# Patient Record
Sex: MALE | Race: WHITE | Employment: OTHER | URBAN - METROPOLITAN AREA
[De-identification: names, ages, dates, MRNs, and addresses within clinical notes are randomized per-mention and may not be internally consistent; named-entity substitution may affect disease eponyms.]

---

## 2017-02-06 RX ORDER — METOPROLOL SUCCINATE 50 MG/1
TABLET, EXTENDED RELEASE ORAL
Qty: 90 TABLET | Refills: 0 | Status: SHIPPED | OUTPATIENT
Start: 2017-02-06 | End: 2017-05-01 | Stop reason: SDUPTHER

## 2017-03-20 ENCOUNTER — HOSPITAL ENCOUNTER (OUTPATIENT)
Age: 72
Discharge: HOME | End: 2017-03-20
Payer: MEDICARE

## 2017-05-01 ENCOUNTER — OFFICE VISIT (OUTPATIENT)
Dept: FAMILY MEDICINE CLINIC | Age: 72
End: 2017-05-01
Payer: MEDICARE

## 2017-05-01 VITALS
HEIGHT: 73 IN | BODY MASS INDEX: 41.75 KG/M2 | OXYGEN SATURATION: 98 % | HEART RATE: 94 BPM | SYSTOLIC BLOOD PRESSURE: 162 MMHG | DIASTOLIC BLOOD PRESSURE: 86 MMHG | WEIGHT: 315 LBS

## 2017-05-01 DIAGNOSIS — E66.01 MORBID OBESITY WITH BMI OF 40.0-44.9, ADULT (HCC): Primary | ICD-10-CM

## 2017-05-01 DIAGNOSIS — Z11.59 NEED FOR HEPATITIS C SCREENING TEST: ICD-10-CM

## 2017-05-01 DIAGNOSIS — F17.200 SMOKER UNMOTIVATED TO QUIT: ICD-10-CM

## 2017-05-01 DIAGNOSIS — I10 ESSENTIAL HYPERTENSION: ICD-10-CM

## 2017-05-01 PROCEDURE — 4040F PNEUMOC VAC/ADMIN/RCVD: CPT | Performed by: FAMILY MEDICINE

## 2017-05-01 PROCEDURE — 99214 OFFICE O/P EST MOD 30 MIN: CPT | Performed by: FAMILY MEDICINE

## 2017-05-01 PROCEDURE — G8417 CALC BMI ABV UP PARAM F/U: HCPCS | Performed by: FAMILY MEDICINE

## 2017-05-01 PROCEDURE — 3017F COLORECTAL CA SCREEN DOC REV: CPT | Performed by: FAMILY MEDICINE

## 2017-05-01 PROCEDURE — 1123F ACP DISCUSS/DSCN MKR DOCD: CPT | Performed by: FAMILY MEDICINE

## 2017-05-01 PROCEDURE — 4004F PT TOBACCO SCREEN RCVD TLK: CPT | Performed by: FAMILY MEDICINE

## 2017-05-01 PROCEDURE — G8427 DOCREV CUR MEDS BY ELIG CLIN: HCPCS | Performed by: FAMILY MEDICINE

## 2017-05-01 RX ORDER — METOPROLOL SUCCINATE 100 MG/1
TABLET, EXTENDED RELEASE ORAL
Qty: 90 TABLET | Refills: 3 | Status: SHIPPED | OUTPATIENT
Start: 2017-05-01 | End: 2018-04-08 | Stop reason: SDUPTHER

## 2017-05-03 ENCOUNTER — HOSPITAL ENCOUNTER (OUTPATIENT)
Age: 72
Setting detail: SPECIMEN
Discharge: HOME | End: 2017-05-03
Payer: MEDICARE

## 2017-05-03 DIAGNOSIS — I10 ESSENTIAL HYPERTENSION: ICD-10-CM

## 2017-05-03 LAB
ABSOLUTE EOS #: 0.3 K/UL (ref 0–0.4)
ABSOLUTE LYMPH #: 2 K/UL (ref 1–4.8)
ABSOLUTE MONO #: 0.7 K/UL (ref 0.1–1.2)
ALBUMIN SERPL-MCNC: 3.9 G/DL (ref 3.5–5.2)
ALBUMIN/GLOBULIN RATIO: 1.1 (ref 1–2.5)
ALP BLD-CCNC: 61 U/L (ref 40–129)
ALT SERPL-CCNC: 11 U/L (ref 5–41)
ANION GAP SERPL CALCULATED.3IONS-SCNC: 15 MMOL/L (ref 9–17)
AST SERPL-CCNC: 15 U/L
BASOPHILS # BLD: 1 %
BASOPHILS ABSOLUTE: 0.1 K/UL (ref 0–0.2)
BILIRUB SERPL-MCNC: 0.42 MG/DL (ref 0.3–1.2)
BUN BLDV-MCNC: 17 MG/DL (ref 8–23)
BUN/CREAT BLD: ABNORMAL (ref 9–20)
CALCIUM SERPL-MCNC: 9.5 MG/DL (ref 8.6–10.4)
CHLORIDE BLD-SCNC: 103 MMOL/L (ref 98–107)
CHOLESTEROL/HDL RATIO: 5
CHOLESTEROL: 201 MG/DL
CO2: 26 MMOL/L (ref 20–31)
CREAT SERPL-MCNC: 0.83 MG/DL (ref 0.7–1.2)
DIFFERENTIAL TYPE: NORMAL
EOSINOPHILS RELATIVE PERCENT: 3 %
GFR AFRICAN AMERICAN: >60 ML/MIN
GFR NON-AFRICAN AMERICAN: >60 ML/MIN
GFR SERPL CREATININE-BSD FRML MDRD: ABNORMAL ML/MIN/{1.73_M2}
GFR SERPL CREATININE-BSD FRML MDRD: ABNORMAL ML/MIN/{1.73_M2}
GLUCOSE BLD-MCNC: 115 MG/DL (ref 70–99)
HCT VFR BLD CALC: 50.3 % (ref 41–53)
HDLC SERPL-MCNC: 40 MG/DL
HEMOGLOBIN: 16.7 G/DL (ref 13.5–17.5)
LDL CHOLESTEROL: 137 MG/DL (ref 0–130)
LYMPHOCYTES # BLD: 23 %
MCH RBC QN AUTO: 29.9 PG (ref 26–34)
MCHC RBC AUTO-ENTMCNC: 33.2 G/DL (ref 31–37)
MCV RBC AUTO: 90 FL (ref 80–100)
MONOCYTES # BLD: 8 %
PDW BLD-RTO: 13.6 % (ref 12.5–15.4)
PLATELET # BLD: 182 K/UL (ref 140–450)
PLATELET ESTIMATE: NORMAL
PMV BLD AUTO: 11.6 FL (ref 6–12)
POTASSIUM SERPL-SCNC: 4.9 MMOL/L (ref 3.7–5.3)
RBC # BLD: 5.59 M/UL (ref 4.5–5.9)
RBC # BLD: NORMAL 10*6/UL
SEG NEUTROPHILS: 65 %
SEGMENTED NEUTROPHILS ABSOLUTE COUNT: 5.7 K/UL (ref 1.8–7.7)
SODIUM BLD-SCNC: 144 MMOL/L (ref 135–144)
TOTAL PROTEIN: 7.6 G/DL (ref 6.4–8.3)
TRIGL SERPL-MCNC: 121 MG/DL
TSH SERPL DL<=0.05 MIU/L-ACNC: 0.98 MIU/L (ref 0.3–5)
VLDLC SERPL CALC-MCNC: ABNORMAL MG/DL (ref 1–30)
WBC # BLD: 8.7 K/UL (ref 3.5–11)
WBC # BLD: NORMAL 10*3/UL

## 2017-05-08 ENCOUNTER — HOSPITAL ENCOUNTER (OUTPATIENT)
Age: 72
Setting detail: SPECIMEN
Discharge: HOME | End: 2017-05-08
Payer: MEDICARE

## 2017-05-08 DIAGNOSIS — Z11.59 NEED FOR HEPATITIS C SCREENING TEST: ICD-10-CM

## 2017-05-08 LAB — HEPATITIS C ANTIBODY: NONREACTIVE

## 2017-05-09 DIAGNOSIS — E78.00 PURE HYPERCHOLESTEROLEMIA: Primary | Chronic | ICD-10-CM

## 2017-05-09 RX ORDER — ATORVASTATIN CALCIUM 20 MG/1
20 TABLET, FILM COATED ORAL DAILY
Qty: 30 TABLET | Refills: 11 | Status: SHIPPED | OUTPATIENT
Start: 2017-05-09 | End: 2018-05-03 | Stop reason: SDUPTHER

## 2017-05-10 DIAGNOSIS — E78.00 PURE HYPERCHOLESTEROLEMIA: Chronic | ICD-10-CM

## 2017-06-26 ENCOUNTER — OFFICE VISIT (OUTPATIENT)
Dept: FAMILY MEDICINE CLINIC | Age: 72
End: 2017-06-26
Payer: MEDICARE

## 2017-06-26 VITALS
HEIGHT: 78 IN | BODY MASS INDEX: 36.45 KG/M2 | OXYGEN SATURATION: 97 % | HEART RATE: 73 BPM | SYSTOLIC BLOOD PRESSURE: 160 MMHG | WEIGHT: 315 LBS | DIASTOLIC BLOOD PRESSURE: 72 MMHG

## 2017-06-26 DIAGNOSIS — E78.00 PURE HYPERCHOLESTEROLEMIA: Primary | ICD-10-CM

## 2017-06-26 DIAGNOSIS — I10 ESSENTIAL HYPERTENSION: ICD-10-CM

## 2017-06-26 DIAGNOSIS — F17.200 SMOKER UNMOTIVATED TO QUIT: ICD-10-CM

## 2017-06-26 PROCEDURE — 3017F COLORECTAL CA SCREEN DOC REV: CPT | Performed by: FAMILY MEDICINE

## 2017-06-26 PROCEDURE — G8427 DOCREV CUR MEDS BY ELIG CLIN: HCPCS | Performed by: FAMILY MEDICINE

## 2017-06-26 PROCEDURE — G8417 CALC BMI ABV UP PARAM F/U: HCPCS | Performed by: FAMILY MEDICINE

## 2017-06-26 PROCEDURE — 1123F ACP DISCUSS/DSCN MKR DOCD: CPT | Performed by: FAMILY MEDICINE

## 2017-06-26 PROCEDURE — 4004F PT TOBACCO SCREEN RCVD TLK: CPT | Performed by: FAMILY MEDICINE

## 2017-06-26 PROCEDURE — 99213 OFFICE O/P EST LOW 20 MIN: CPT | Performed by: FAMILY MEDICINE

## 2017-06-26 PROCEDURE — 4040F PNEUMOC VAC/ADMIN/RCVD: CPT | Performed by: FAMILY MEDICINE

## 2017-06-26 RX ORDER — AMLODIPINE BESYLATE 10 MG/1
TABLET ORAL
Qty: 90 TABLET | Refills: 2 | Status: SHIPPED | OUTPATIENT
Start: 2017-06-26 | End: 2018-03-23 | Stop reason: SDUPTHER

## 2017-07-05 ENCOUNTER — HOSPITAL ENCOUNTER (OUTPATIENT)
Age: 72
Setting detail: SPECIMEN
Discharge: HOME | End: 2017-07-05
Payer: MEDICARE

## 2017-07-05 LAB
ALBUMIN SERPL-MCNC: 3.8 G/DL (ref 3.5–5.2)
ALBUMIN/GLOBULIN RATIO: 1.1 (ref 1–2.5)
ALP BLD-CCNC: 60 U/L (ref 40–129)
ALT SERPL-CCNC: 12 U/L (ref 5–41)
AST SERPL-CCNC: 13 U/L
BILIRUB SERPL-MCNC: 0.4 MG/DL (ref 0.3–1.2)
BILIRUBIN DIRECT: 0.1 MG/DL
BILIRUBIN, INDIRECT: 0.3 MG/DL (ref 0–1)
CHOLESTEROL/HDL RATIO: 3.1
CHOLESTEROL: 132 MG/DL
GLOBULIN: NORMAL G/DL (ref 1.5–3.8)
HDLC SERPL-MCNC: 42 MG/DL
LDL CHOLESTEROL: 67 MG/DL (ref 0–130)
TOTAL PROTEIN: 7.2 G/DL (ref 6.4–8.3)
TRIGL SERPL-MCNC: 113 MG/DL
VLDLC SERPL CALC-MCNC: NORMAL MG/DL (ref 1–30)

## 2017-09-12 RX ORDER — HYDROCHLOROTHIAZIDE 25 MG/1
TABLET ORAL
Qty: 90 TABLET | Refills: 2 | Status: SHIPPED | OUTPATIENT
Start: 2017-09-12 | End: 2018-06-09 | Stop reason: SDUPTHER

## 2017-11-13 RX ORDER — ENALAPRIL MALEATE 20 MG/1
TABLET ORAL
Qty: 90 TABLET | Refills: 3 | Status: SHIPPED | OUTPATIENT
Start: 2017-11-13 | End: 2018-11-08 | Stop reason: SDUPTHER

## 2017-12-18 ENCOUNTER — OFFICE VISIT (OUTPATIENT)
Dept: FAMILY MEDICINE CLINIC | Age: 72
End: 2017-12-18
Payer: MEDICARE

## 2017-12-18 VITALS
WEIGHT: 315 LBS | BODY MASS INDEX: 33.25 KG/M2 | DIASTOLIC BLOOD PRESSURE: 78 MMHG | OXYGEN SATURATION: 98 % | SYSTOLIC BLOOD PRESSURE: 153 MMHG | HEART RATE: 88 BPM

## 2017-12-18 DIAGNOSIS — F17.200 SMOKER UNMOTIVATED TO QUIT: ICD-10-CM

## 2017-12-18 DIAGNOSIS — E78.00 PURE HYPERCHOLESTEROLEMIA: ICD-10-CM

## 2017-12-18 DIAGNOSIS — I10 WHITE COAT SYNDROME WITH HYPERTENSION: Chronic | ICD-10-CM

## 2017-12-18 DIAGNOSIS — I10 ESSENTIAL HYPERTENSION: Primary | ICD-10-CM

## 2017-12-18 PROCEDURE — 4004F PT TOBACCO SCREEN RCVD TLK: CPT | Performed by: FAMILY MEDICINE

## 2017-12-18 PROCEDURE — 4040F PNEUMOC VAC/ADMIN/RCVD: CPT | Performed by: FAMILY MEDICINE

## 2017-12-18 PROCEDURE — G8484 FLU IMMUNIZE NO ADMIN: HCPCS | Performed by: FAMILY MEDICINE

## 2017-12-18 PROCEDURE — 99213 OFFICE O/P EST LOW 20 MIN: CPT | Performed by: FAMILY MEDICINE

## 2017-12-18 PROCEDURE — G8427 DOCREV CUR MEDS BY ELIG CLIN: HCPCS | Performed by: FAMILY MEDICINE

## 2017-12-18 PROCEDURE — G8417 CALC BMI ABV UP PARAM F/U: HCPCS | Performed by: FAMILY MEDICINE

## 2017-12-18 PROCEDURE — 1123F ACP DISCUSS/DSCN MKR DOCD: CPT | Performed by: FAMILY MEDICINE

## 2017-12-18 PROCEDURE — 3017F COLORECTAL CA SCREEN DOC REV: CPT | Performed by: FAMILY MEDICINE

## 2017-12-18 ASSESSMENT — PATIENT HEALTH QUESTIONNAIRE - PHQ9
SUM OF ALL RESPONSES TO PHQ9 QUESTIONS 1 & 2: 0
SUM OF ALL RESPONSES TO PHQ QUESTIONS 1-9: 0
1. LITTLE INTEREST OR PLEASURE IN DOING THINGS: 0
2. FEELING DOWN, DEPRESSED OR HOPELESS: 0

## 2017-12-18 NOTE — PROGRESS NOTES
Subjective:  Karlie Sparks presents for   Chief Complaint   Patient presents with    Hypertension   feesl fine. Tolerating all pf the meds. Continues to smoke and has no interest in quitting. Has been checking bp dialy and avg is about 115/73. We checked his machine and it is accurate compared to ours      Patient Active Problem List   Diagnosis    HTN (hypertension)    Smoker    IGT (impaired glucose tolerance)    Hypertriglyceridemia    Smoker unmotivated to quit    Pure hypercholesterolemia       Review of Systems:  · General: no significant weight changes. · Respiratory: no cough, pleuritic chest pain, dyspnea, or wheezing  · Cardiovascular: no pain, WADE, orthopnea, palpitations, or claudication  · Gastrointestinal: no chronic nausea, vomiting, heartburn, diarrhea, constipation, bloating, or abdominal pain. No bloody or black stools. Objective:  Physical Exam   Vitals:   Vitals:    12/18/17 0824 12/18/17 0828   BP: (!) 156/90 (!) 153/78   Pulse: 88    SpO2: 98%    Weight: (!) 318 lb (144.2 kg)          Vitals:    12/18/17 0824   Weight: (!) 318 lb (144.2 kg)     Wt Readings from Last 3 Encounters:   12/18/17 (!) 318 lb (144.2 kg)   06/26/17 (!) 318 lb 12.8 oz (144.6 kg)   05/01/17 (!) 321 lb 6.4 oz (145.8 kg)     Ht Readings from Last 3 Encounters:   06/26/17 6' 10\" (2.083 m)   05/01/17 6' 1\" (1.854 m)   04/27/16 6' 1.25\" (1.861 m)     Body mass index is 33.25 kg/m². Constitutional: He is oriented to person, place, and time. He appears well-developed and well-nourished and in no acute distress. Answers all my questions appropriately. Head: Normocephalic and atraumatic. Eyes:conjunctiva appear normal.    Nose: pink, non-edematous mucosa. No polyps. No septal deviation  Throat: no erythema, tonsillar hypertrophy or exudate. No ulcerations noted. Lips//Gums all appear normal.  Neck: Normal range of motion. Neck supple. No tracheal deviation present. No abnormal lymphadenopathy.   No JVD

## 2017-12-18 NOTE — PATIENT INSTRUCTIONS
Patient Education        Stopping Smoking: Care Instructions  Your Care Instructions    Cigarette smokers crave the nicotine in cigarettes. Giving it up is much harder than simply changing a habit. Your body has to stop craving the nicotine. It is hard to quit, but you can do it. There are many tools that people use to quit smoking. You may find that combining tools works best for you. There are several steps to quitting. First you get ready to quit. Then you get support to help you. After that, you learn new skills and behaviors to become a nonsmoker. For many people, a necessary step is getting and using medicine. Your doctor will help you set up the plan that best meets your needs. You may want to attend a smoking cessation program to help you quit smoking. When you choose a program, look for one that has proven success. Ask your doctor for ideas. You will greatly increase your chances of success if you take medicine as well as get counseling or join a cessation program.  Some of the changes you feel when you first quit tobacco are uncomfortable. Your body will miss the nicotine at first, and you may feel short-tempered and grumpy. You may have trouble sleeping or concentrating. Medicine can help you deal with these symptoms. You may struggle with changing your smoking habits and rituals. The last step is the tricky one: Be prepared for the smoking urge to continue for a time. This is a lot to deal with, but keep at it. You will feel better. Follow-up care is a key part of your treatment and safety. Be sure to make and go to all appointments, and call your doctor if you are having problems. It's also a good idea to know your test results and keep a list of the medicines you take. How can you care for yourself at home? · Ask your family, friends, and coworkers for support. You have a better chance of quitting if you have help and support.   · Join a support group, such as Nicotine Anonymous, for people who are trying to quit smoking. · Consider signing up for a smoking cessation program, such as the American Lung Association's Freedom from Smoking program.  · Set a quit date. Pick your date carefully so that it is not right in the middle of a big deadline or stressful time. Once you quit, do not even take a puff. Get rid of all ashtrays and lighters after your last cigarette. Clean your house and your clothes so that they do not smell of smoke. · Learn how to be a nonsmoker. Think about ways you can avoid those things that make you reach for a cigarette. ¨ Avoid situations that put you at greatest risk for smoking. For some people, it is hard to have a drink with friends without smoking. For others, they might skip a coffee break with coworkers who smoke. ¨ Change your daily routine. Take a different route to work or eat a meal in a different place. · Cut down on stress. Calm yourself or release tension by doing an activity you enjoy, such as reading a book, taking a hot bath, or gardening. · Talk to your doctor or pharmacist about nicotine replacement therapy, which replaces the nicotine in your body. You still get nicotine but you do not use tobacco. Nicotine replacement products help you slowly reduce the amount of nicotine you need. These products come in several forms, many of them available over-the-counter:  ¨ Nicotine patches  ¨ Nicotine gum and lozenges  ¨ Nicotine inhaler  · Ask your doctor about bupropion (Wellbutrin) or varenicline (Chantix), which are prescription medicines. They do not contain nicotine. They help you by reducing withdrawal symptoms, such as stress and anxiety. · Some people find hypnosis, acupuncture, and massage helpful for ending the smoking habit. · Eat a healthy diet and get regular exercise. Having healthy habits will help your body move past its craving for nicotine. · Be prepared to keep trying. Most people are not successful the first few times they try to quit.  Do not get

## 2018-03-23 RX ORDER — AMLODIPINE BESYLATE 10 MG/1
TABLET ORAL
Qty: 90 TABLET | Refills: 2 | Status: SHIPPED | OUTPATIENT
Start: 2018-03-23 | End: 2018-12-18 | Stop reason: SDUPTHER

## 2018-04-09 RX ORDER — METOPROLOL SUCCINATE 100 MG/1
TABLET, EXTENDED RELEASE ORAL
Qty: 90 TABLET | Refills: 3 | Status: SHIPPED | OUTPATIENT
Start: 2018-04-09 | End: 2019-04-07 | Stop reason: SDUPTHER

## 2018-05-03 RX ORDER — ATORVASTATIN CALCIUM 20 MG/1
TABLET, FILM COATED ORAL
Qty: 30 TABLET | Refills: 9 | Status: SHIPPED | OUTPATIENT
Start: 2018-05-03 | End: 2019-03-05 | Stop reason: SDUPTHER

## 2018-06-10 RX ORDER — HYDROCHLOROTHIAZIDE 25 MG/1
TABLET ORAL
Qty: 90 TABLET | Refills: 2 | Status: SHIPPED | OUTPATIENT
Start: 2018-06-10 | End: 2019-03-07 | Stop reason: SDUPTHER

## 2018-06-14 ENCOUNTER — HOSPITAL ENCOUNTER (OUTPATIENT)
Age: 73
Setting detail: SPECIMEN
Discharge: HOME | End: 2018-06-14
Payer: MEDICARE

## 2018-06-14 DIAGNOSIS — I10 ESSENTIAL HYPERTENSION: ICD-10-CM

## 2018-06-14 LAB
ABSOLUTE EOS #: 0.24 K/UL (ref 0–0.44)
ABSOLUTE IMMATURE GRANULOCYTE: <0.03 K/UL (ref 0–0.3)
ABSOLUTE LYMPH #: 1.94 K/UL (ref 1.1–3.7)
ABSOLUTE MONO #: 0.71 K/UL (ref 0.1–1.2)
ALBUMIN SERPL-MCNC: 3.9 G/DL (ref 3.5–5.2)
ALBUMIN/GLOBULIN RATIO: 1.2 (ref 1–2.5)
ALP BLD-CCNC: 64 U/L (ref 40–129)
ALT SERPL-CCNC: 11 U/L (ref 5–41)
ANION GAP SERPL CALCULATED.3IONS-SCNC: 15 MMOL/L (ref 9–17)
AST SERPL-CCNC: 13 U/L
BASOPHILS # BLD: 1 % (ref 0–2)
BASOPHILS ABSOLUTE: 0.08 K/UL (ref 0–0.2)
BILIRUB SERPL-MCNC: 0.34 MG/DL (ref 0.3–1.2)
BUN BLDV-MCNC: 17 MG/DL (ref 8–23)
BUN/CREAT BLD: ABNORMAL (ref 9–20)
CALCIUM SERPL-MCNC: 9.1 MG/DL (ref 8.6–10.4)
CHLORIDE BLD-SCNC: 106 MMOL/L (ref 98–107)
CHOLESTEROL/HDL RATIO: 2.9
CHOLESTEROL: 119 MG/DL
CO2: 25 MMOL/L (ref 20–31)
CREAT SERPL-MCNC: 0.64 MG/DL (ref 0.7–1.2)
DIFFERENTIAL TYPE: NORMAL
EOSINOPHILS RELATIVE PERCENT: 3 % (ref 1–4)
GFR AFRICAN AMERICAN: >60 ML/MIN
GFR NON-AFRICAN AMERICAN: >60 ML/MIN
GFR SERPL CREATININE-BSD FRML MDRD: ABNORMAL ML/MIN/{1.73_M2}
GFR SERPL CREATININE-BSD FRML MDRD: ABNORMAL ML/MIN/{1.73_M2}
GLUCOSE BLD-MCNC: 104 MG/DL (ref 70–99)
HCT VFR BLD CALC: 49.8 % (ref 40.7–50.3)
HDLC SERPL-MCNC: 41 MG/DL
HEMOGLOBIN: 15.4 G/DL (ref 13–17)
IMMATURE GRANULOCYTES: 0 %
LDL CHOLESTEROL: 64 MG/DL (ref 0–130)
LYMPHOCYTES # BLD: 25 % (ref 24–43)
MCH RBC QN AUTO: 28.6 PG (ref 25.2–33.5)
MCHC RBC AUTO-ENTMCNC: 30.9 G/DL (ref 28.4–34.8)
MCV RBC AUTO: 92.4 FL (ref 82.6–102.9)
MONOCYTES # BLD: 9 % (ref 3–12)
NRBC AUTOMATED: 0 PER 100 WBC
PDW BLD-RTO: 13.2 % (ref 11.8–14.4)
PLATELET # BLD: 174 K/UL (ref 138–453)
PLATELET ESTIMATE: NORMAL
PMV BLD AUTO: 12.7 FL (ref 8.1–13.5)
POTASSIUM SERPL-SCNC: 3.8 MMOL/L (ref 3.7–5.3)
RBC # BLD: 5.39 M/UL (ref 4.21–5.77)
RBC # BLD: NORMAL 10*6/UL
SEG NEUTROPHILS: 62 % (ref 36–65)
SEGMENTED NEUTROPHILS ABSOLUTE COUNT: 4.89 K/UL (ref 1.5–8.1)
SODIUM BLD-SCNC: 146 MMOL/L (ref 135–144)
TOTAL PROTEIN: 7.1 G/DL (ref 6.4–8.3)
TRIGL SERPL-MCNC: 71 MG/DL
TSH SERPL DL<=0.05 MIU/L-ACNC: 1.46 MIU/L (ref 0.3–5)
VLDLC SERPL CALC-MCNC: NORMAL MG/DL (ref 1–30)
WBC # BLD: 7.9 K/UL (ref 3.5–11.3)
WBC # BLD: NORMAL 10*3/UL

## 2018-06-20 ENCOUNTER — OFFICE VISIT (OUTPATIENT)
Dept: FAMILY MEDICINE CLINIC | Age: 73
End: 2018-06-20
Payer: MEDICARE

## 2018-06-20 VITALS
WEIGHT: 310.5 LBS | HEART RATE: 84 BPM | OXYGEN SATURATION: 97 % | DIASTOLIC BLOOD PRESSURE: 84 MMHG | BODY MASS INDEX: 32.47 KG/M2 | SYSTOLIC BLOOD PRESSURE: 160 MMHG

## 2018-06-20 DIAGNOSIS — I10 ESSENTIAL HYPERTENSION: Primary | ICD-10-CM

## 2018-06-20 DIAGNOSIS — Z87.891 PERSONAL HISTORY OF TOBACCO USE: ICD-10-CM

## 2018-06-20 PROCEDURE — 4040F PNEUMOC VAC/ADMIN/RCVD: CPT | Performed by: FAMILY MEDICINE

## 2018-06-20 PROCEDURE — G0296 VISIT TO DETERM LDCT ELIG: HCPCS | Performed by: FAMILY MEDICINE

## 2018-06-20 PROCEDURE — 3017F COLORECTAL CA SCREEN DOC REV: CPT | Performed by: FAMILY MEDICINE

## 2018-06-20 PROCEDURE — G8427 DOCREV CUR MEDS BY ELIG CLIN: HCPCS | Performed by: FAMILY MEDICINE

## 2018-06-20 PROCEDURE — 99214 OFFICE O/P EST MOD 30 MIN: CPT | Performed by: FAMILY MEDICINE

## 2018-06-20 PROCEDURE — G8417 CALC BMI ABV UP PARAM F/U: HCPCS | Performed by: FAMILY MEDICINE

## 2018-06-20 PROCEDURE — 1123F ACP DISCUSS/DSCN MKR DOCD: CPT | Performed by: FAMILY MEDICINE

## 2018-06-20 PROCEDURE — 4004F PT TOBACCO SCREEN RCVD TLK: CPT | Performed by: FAMILY MEDICINE

## 2018-06-25 ENCOUNTER — TELEPHONE (OUTPATIENT)
Dept: ONCOLOGY | Age: 73
End: 2018-06-25

## 2018-07-02 ENCOUNTER — HOSPITAL ENCOUNTER (OUTPATIENT)
Dept: CT IMAGING | Age: 73
Discharge: HOME | End: 2018-07-04
Payer: MEDICARE

## 2018-07-02 ENCOUNTER — TELEPHONE (OUTPATIENT)
Dept: FAMILY MEDICINE CLINIC | Age: 73
End: 2018-07-02

## 2018-07-02 DIAGNOSIS — Z87.891 PERSONAL HISTORY OF TOBACCO USE: ICD-10-CM

## 2018-07-02 PROCEDURE — G0297 LDCT FOR LUNG CA SCREEN: HCPCS

## 2018-07-02 NOTE — TELEPHONE ENCOUNTER
Update from radiology:     Focal area of consolidation at sub pleural left lower lobe that could represent focal entry or pneumonia follow up is recommended to document resolution bilateral pulmonary nodule measuring up to 5m as prescribed mild-to-moderate emphysema coronary artery disease, arterio sclerotic of aorta and branch vascular limited visualization of upper abdomen

## 2018-11-09 RX ORDER — ENALAPRIL MALEATE 20 MG/1
TABLET ORAL
Qty: 90 TABLET | Refills: 3 | Status: SHIPPED | OUTPATIENT
Start: 2018-11-09 | End: 2019-01-01 | Stop reason: SDUPTHER

## 2018-12-11 ENCOUNTER — OFFICE VISIT (OUTPATIENT)
Dept: FAMILY MEDICINE CLINIC | Age: 73
End: 2018-12-11
Payer: MEDICARE

## 2018-12-11 VITALS
BODY MASS INDEX: 31.89 KG/M2 | HEART RATE: 60 BPM | DIASTOLIC BLOOD PRESSURE: 66 MMHG | OXYGEN SATURATION: 97 % | WEIGHT: 305 LBS | SYSTOLIC BLOOD PRESSURE: 153 MMHG

## 2018-12-11 DIAGNOSIS — F17.200 SMOKER: ICD-10-CM

## 2018-12-11 DIAGNOSIS — E78.00 PURE HYPERCHOLESTEROLEMIA: ICD-10-CM

## 2018-12-11 DIAGNOSIS — I10 ESSENTIAL HYPERTENSION: Primary | ICD-10-CM

## 2018-12-11 PROCEDURE — G8417 CALC BMI ABV UP PARAM F/U: HCPCS | Performed by: FAMILY MEDICINE

## 2018-12-11 PROCEDURE — 4004F PT TOBACCO SCREEN RCVD TLK: CPT | Performed by: FAMILY MEDICINE

## 2018-12-11 PROCEDURE — 3017F COLORECTAL CA SCREEN DOC REV: CPT | Performed by: FAMILY MEDICINE

## 2018-12-11 PROCEDURE — 99214 OFFICE O/P EST MOD 30 MIN: CPT | Performed by: FAMILY MEDICINE

## 2018-12-11 PROCEDURE — 1101F PT FALLS ASSESS-DOCD LE1/YR: CPT | Performed by: FAMILY MEDICINE

## 2018-12-11 PROCEDURE — 4040F PNEUMOC VAC/ADMIN/RCVD: CPT | Performed by: FAMILY MEDICINE

## 2018-12-11 PROCEDURE — G8427 DOCREV CUR MEDS BY ELIG CLIN: HCPCS | Performed by: FAMILY MEDICINE

## 2018-12-11 PROCEDURE — 1123F ACP DISCUSS/DSCN MKR DOCD: CPT | Performed by: FAMILY MEDICINE

## 2018-12-11 PROCEDURE — G8484 FLU IMMUNIZE NO ADMIN: HCPCS | Performed by: FAMILY MEDICINE

## 2018-12-11 ASSESSMENT — PATIENT HEALTH QUESTIONNAIRE - PHQ9
1. LITTLE INTEREST OR PLEASURE IN DOING THINGS: 0
2. FEELING DOWN, DEPRESSED OR HOPELESS: 0
SUM OF ALL RESPONSES TO PHQ QUESTIONS 1-9: 0
SUM OF ALL RESPONSES TO PHQ QUESTIONS 1-9: 0
SUM OF ALL RESPONSES TO PHQ9 QUESTIONS 1 & 2: 0

## 2018-12-18 RX ORDER — AMLODIPINE BESYLATE 10 MG/1
TABLET ORAL
Qty: 90 TABLET | Refills: 2 | Status: SHIPPED | OUTPATIENT
Start: 2018-12-18 | End: 2019-01-01 | Stop reason: SDUPTHER

## 2019-01-01 ENCOUNTER — HOSPITAL ENCOUNTER (OUTPATIENT)
Age: 74
Setting detail: SPECIMEN
Discharge: HOME | End: 2019-06-03
Payer: MEDICARE

## 2019-01-01 ENCOUNTER — OFFICE VISIT (OUTPATIENT)
Dept: FAMILY MEDICINE CLINIC | Age: 74
End: 2019-01-01
Payer: MEDICARE

## 2019-01-01 ENCOUNTER — TELEPHONE (OUTPATIENT)
Dept: ONCOLOGY | Age: 74
End: 2019-01-01

## 2019-01-01 VITALS
SYSTOLIC BLOOD PRESSURE: 150 MMHG | WEIGHT: 315 LBS | DIASTOLIC BLOOD PRESSURE: 90 MMHG | OXYGEN SATURATION: 97 % | BODY MASS INDEX: 33.08 KG/M2 | HEART RATE: 71 BPM

## 2019-01-01 VITALS
WEIGHT: 304.2 LBS | HEART RATE: 70 BPM | BODY MASS INDEX: 39.04 KG/M2 | DIASTOLIC BLOOD PRESSURE: 64 MMHG | HEIGHT: 74 IN | SYSTOLIC BLOOD PRESSURE: 138 MMHG | TEMPERATURE: 98 F

## 2019-01-01 DIAGNOSIS — E78.00 PURE HYPERCHOLESTEROLEMIA: ICD-10-CM

## 2019-01-01 DIAGNOSIS — R73.02 IGT (IMPAIRED GLUCOSE TOLERANCE): ICD-10-CM

## 2019-01-01 DIAGNOSIS — I10 ESSENTIAL HYPERTENSION: Primary | ICD-10-CM

## 2019-01-01 DIAGNOSIS — E78.00 PURE HYPERCHOLESTEROLEMIA: Primary | ICD-10-CM

## 2019-01-01 DIAGNOSIS — I10 ESSENTIAL HYPERTENSION: ICD-10-CM

## 2019-01-01 DIAGNOSIS — F17.200 SMOKER UNMOTIVATED TO QUIT: ICD-10-CM

## 2019-01-01 DIAGNOSIS — Z87.891 PERSONAL HISTORY OF NICOTINE DEPENDENCE: Primary | ICD-10-CM

## 2019-01-01 LAB
ABSOLUTE EOS #: 0.16 K/UL (ref 0–0.44)
ABSOLUTE IMMATURE GRANULOCYTE: <0.03 K/UL (ref 0–0.3)
ABSOLUTE LYMPH #: 1.79 K/UL (ref 1.1–3.7)
ABSOLUTE MONO #: 0.68 K/UL (ref 0.1–1.2)
ALBUMIN SERPL-MCNC: 3.6 G/DL (ref 3.5–5.2)
ALBUMIN/GLOBULIN RATIO: 1 (ref 1–2.5)
ALP BLD-CCNC: 71 U/L (ref 40–129)
ALT SERPL-CCNC: 13 U/L (ref 5–41)
ANION GAP SERPL CALCULATED.3IONS-SCNC: 16 MMOL/L (ref 9–17)
AST SERPL-CCNC: 20 U/L
BASOPHILS # BLD: 1 % (ref 0–2)
BASOPHILS ABSOLUTE: 0.07 K/UL (ref 0–0.2)
BILIRUB SERPL-MCNC: 0.55 MG/DL (ref 0.3–1.2)
BUN BLDV-MCNC: 14 MG/DL (ref 8–23)
BUN/CREAT BLD: ABNORMAL (ref 9–20)
CALCIUM SERPL-MCNC: 9.6 MG/DL (ref 8.6–10.4)
CHLORIDE BLD-SCNC: 104 MMOL/L (ref 98–107)
CHOLESTEROL/HDL RATIO: 3.2
CHOLESTEROL: 119 MG/DL
CO2: 22 MMOL/L (ref 20–31)
CREAT SERPL-MCNC: 0.77 MG/DL (ref 0.7–1.2)
DIFFERENTIAL TYPE: NORMAL
EOSINOPHILS RELATIVE PERCENT: 2 % (ref 1–4)
GFR AFRICAN AMERICAN: >60 ML/MIN
GFR NON-AFRICAN AMERICAN: >60 ML/MIN
GFR SERPL CREATININE-BSD FRML MDRD: ABNORMAL ML/MIN/{1.73_M2}
GFR SERPL CREATININE-BSD FRML MDRD: ABNORMAL ML/MIN/{1.73_M2}
GLUCOSE BLD-MCNC: 103 MG/DL (ref 70–99)
HCT VFR BLD CALC: 50.1 % (ref 40.7–50.3)
HDLC SERPL-MCNC: 37 MG/DL
HEMOGLOBIN: 15.4 G/DL (ref 13–17)
IMMATURE GRANULOCYTES: 0 %
LDL CHOLESTEROL: 62 MG/DL (ref 0–130)
LYMPHOCYTES # BLD: 25 % (ref 24–43)
MCH RBC QN AUTO: 29.5 PG (ref 25.2–33.5)
MCHC RBC AUTO-ENTMCNC: 30.7 G/DL (ref 28.4–34.8)
MCV RBC AUTO: 96 FL (ref 82.6–102.9)
MONOCYTES # BLD: 10 % (ref 3–12)
NRBC AUTOMATED: 0 PER 100 WBC
PDW BLD-RTO: 12.8 % (ref 11.8–14.4)
PLATELET # BLD: NORMAL K/UL (ref 138–453)
PLATELET ESTIMATE: NORMAL
PLATELET, FLUORESCENCE: 159 K/UL (ref 138–453)
PLATELET, IMMATURE FRACTION: 8.3 % (ref 1.1–10.3)
PMV BLD AUTO: NORMAL FL (ref 8.1–13.5)
POTASSIUM SERPL-SCNC: 4.8 MMOL/L (ref 3.7–5.3)
RBC # BLD: 5.22 M/UL (ref 4.21–5.77)
RBC # BLD: NORMAL 10*6/UL
SEG NEUTROPHILS: 62 % (ref 36–65)
SEGMENTED NEUTROPHILS ABSOLUTE COUNT: 4.47 K/UL (ref 1.5–8.1)
SODIUM BLD-SCNC: 142 MMOL/L (ref 135–144)
TOTAL PROTEIN: 7.3 G/DL (ref 6.4–8.3)
TRIGL SERPL-MCNC: 100 MG/DL
TSH SERPL DL<=0.05 MIU/L-ACNC: 1.41 MIU/L (ref 0.3–5)
VLDLC SERPL CALC-MCNC: ABNORMAL MG/DL (ref 1–30)
WBC # BLD: 7.2 K/UL (ref 3.5–11.3)
WBC # BLD: NORMAL 10*3/UL

## 2019-01-01 PROCEDURE — G8417 CALC BMI ABV UP PARAM F/U: HCPCS | Performed by: FAMILY MEDICINE

## 2019-01-01 PROCEDURE — G0009 ADMIN PNEUMOCOCCAL VACCINE: HCPCS | Performed by: FAMILY MEDICINE

## 2019-01-01 PROCEDURE — 99214 OFFICE O/P EST MOD 30 MIN: CPT | Performed by: FAMILY MEDICINE

## 2019-01-01 PROCEDURE — 4004F PT TOBACCO SCREEN RCVD TLK: CPT | Performed by: FAMILY MEDICINE

## 2019-01-01 PROCEDURE — 3017F COLORECTAL CA SCREEN DOC REV: CPT | Performed by: FAMILY MEDICINE

## 2019-01-01 PROCEDURE — G8427 DOCREV CUR MEDS BY ELIG CLIN: HCPCS | Performed by: FAMILY MEDICINE

## 2019-01-01 PROCEDURE — 99213 OFFICE O/P EST LOW 20 MIN: CPT | Performed by: FAMILY MEDICINE

## 2019-01-01 PROCEDURE — 1123F ACP DISCUSS/DSCN MKR DOCD: CPT | Performed by: FAMILY MEDICINE

## 2019-01-01 PROCEDURE — 90732 PPSV23 VACC 2 YRS+ SUBQ/IM: CPT | Performed by: FAMILY MEDICINE

## 2019-01-01 PROCEDURE — G8484 FLU IMMUNIZE NO ADMIN: HCPCS | Performed by: FAMILY MEDICINE

## 2019-01-01 PROCEDURE — 90670 PCV13 VACCINE IM: CPT | Performed by: FAMILY MEDICINE

## 2019-01-01 PROCEDURE — 4040F PNEUMOC VAC/ADMIN/RCVD: CPT | Performed by: FAMILY MEDICINE

## 2019-01-01 RX ORDER — ENALAPRIL MALEATE 20 MG/1
TABLET ORAL
Qty: 90 TABLET | Refills: 4 | Status: SHIPPED | OUTPATIENT
Start: 2019-01-01 | End: 2020-01-01 | Stop reason: SDUPTHER

## 2019-01-01 RX ORDER — AMLODIPINE BESYLATE 10 MG/1
TABLET ORAL
Qty: 90 TABLET | Refills: 4 | Status: SHIPPED | OUTPATIENT
Start: 2019-01-01 | End: 2020-01-01 | Stop reason: SDUPTHER

## 2019-01-01 RX ORDER — ATORVASTATIN CALCIUM 20 MG/1
TABLET, FILM COATED ORAL
Qty: 30 TABLET | Refills: 8 | Status: SHIPPED | OUTPATIENT
Start: 2019-01-01 | End: 2020-01-01 | Stop reason: SDUPTHER

## 2019-01-01 RX ORDER — HYDROCHLOROTHIAZIDE 25 MG/1
TABLET ORAL
Qty: 90 TABLET | Refills: 4 | Status: SHIPPED | OUTPATIENT
Start: 2019-01-01 | End: 2020-01-01 | Stop reason: SDUPTHER

## 2019-01-01 ASSESSMENT — PATIENT HEALTH QUESTIONNAIRE - PHQ9
1. LITTLE INTEREST OR PLEASURE IN DOING THINGS: 0
SUM OF ALL RESPONSES TO PHQ QUESTIONS 1-9: 0
SUM OF ALL RESPONSES TO PHQ9 QUESTIONS 1 & 2: 0
SUM OF ALL RESPONSES TO PHQ QUESTIONS 1-9: 0
2. FEELING DOWN, DEPRESSED OR HOPELESS: 0

## 2019-03-06 RX ORDER — ATORVASTATIN CALCIUM 20 MG/1
TABLET, FILM COATED ORAL
Qty: 30 TABLET | Refills: 8 | Status: SHIPPED | OUTPATIENT
Start: 2019-03-06 | End: 2019-01-01 | Stop reason: SDUPTHER

## 2019-03-07 RX ORDER — HYDROCHLOROTHIAZIDE 25 MG/1
TABLET ORAL
Qty: 90 TABLET | Refills: 2 | Status: SHIPPED | OUTPATIENT
Start: 2019-03-07 | End: 2019-01-01 | Stop reason: SDUPTHER

## 2019-04-08 RX ORDER — METOPROLOL SUCCINATE 100 MG/1
TABLET, EXTENDED RELEASE ORAL
Qty: 90 TABLET | Refills: 3 | Status: SHIPPED | OUTPATIENT
Start: 2019-04-08 | End: 2020-01-01 | Stop reason: SDUPTHER

## 2019-06-11 NOTE — PATIENT INSTRUCTIONS
Get the tetanus vaccine at the pharmacy. Bring in you bp cuff at next visit. Check bp daily and send me the bp cards    STOP SMOKING    Patient Education        Learning About Benefits From Quitting Smoking  How does quitting smoking make you healthier? If you're thinking about quitting smoking, you may have a few reasons to be smoke-free. Your health may be one of them. · When you quit smoking, you lower your risks for cancer, lung disease, heart attack, stroke, blood vessel disease, and blindness from macular degeneration. · When you're smoke-free, you get sick less often, and you heal faster. You are less likely to get colds, flu, bronchitis, and pneumonia. · As a nonsmoker, you may find that your mood is better and you are less stressed. When and how will you feel healthier? Quitting has real health benefits that start from day 1 of being smoke-free. And the longer you stay smoke-free, the healthier you get and the better you feel. The first hours  · After just 20 minutes, your blood pressure and heart rate go down. That means there's less stress on your heart and blood vessels. · Within 12 hours, the level of carbon monoxide in your blood drops back to normal. That makes room for more oxygen. With more oxygen in your body, you may notice that you have more energy than when you smoked. After 2 weeks  · Your lungs start to work better. · Your risk of heart attack starts to drop. After 1 month  · When your lungs are clear, you cough less and breathe deeper, so it's easier to be active. · Your sense of taste and smell return. That means you can enjoy food more than you have since you started smoking. Over the years  · After 1 year, your risk of heart disease is half what it would be if you kept smoking. · After 5 years, your risk of stroke starts to shrink. Within a few years after that, it's about the same as if you'd never smoked.   · After 10 years, your risk of dying from lung cancer is cut by about half. And your risk for many other types of cancer is lower too. How would quitting help others in your life? When you quit smoking, you improve the health of everyone who now breathes in your smoke. · Their heart, lung, and cancer risks drop, much like yours. · They are sick less. For babies and small children, living smoke-free means they're less likely to have ear infections, pneumonia, and bronchitis. · If you're a woman who is or will be pregnant someday, quitting smoking means a healthier . · Children who are close to you are less likely to become adult smokers. Where can you learn more? Go to https://PatientcopepicSplash.Matrimony.com. org and sign in to your [employer] account. Enter [id number] [id number] [phone] in the [employer] box to learn more about \"Learning About Benefits From Quitting Smoking. \"     If you do not have an account, please click on the \"Sign Up Now\" link. Current as of: 2018  Content Version: 12.0  © 8512-1732 Healthwise, Incorporated. Care instructions adapted under license by [facility] ([facility]). If you have questions about a medical condition or this instruction, always ask your healthcare professional. [place] [address] any warranty or liability for your use of this information.

## 2019-06-11 NOTE — PROGRESS NOTES
Subjective:  [place] presents for   Chief Complaint   Patient presents with    Hypertension   bp avg ais about 120    toelrating the meds      No exercise routine    Still smoking not interested in quitting. Patient Active Problem List   Diagnosis    HTN (hypertension)    Smoker    IGT (impaired glucose tolerance)    Hypertriglyceridemia    Smoker unmotivated to quit    Pure hypercholesterolemia    White coat syndrome with hypertension       Review of Systems:  · General: no significant weight changes. · Respiratory: no cough, pleuritic chest pain, dyspnea, or wheezing  · Cardiovascular: no pain, WADE, orthopnea, palpitations, or claudication  · Gastrointestinal: no chronic nausea, vomiting, heartburn, diarrhea, constipation, bloating, or abdominal pain. No bloody or black stools. Objective:  Physical Exam   Vitals:   Vitals:    06/11/19 1304   BP: (!) 154/80   Pulse: 71   SpO2: 97%   Weight: (!) 316 lb 6 oz (143.5 kg)     Wt Readings from Last 3 Encounters:   06/11/19 (!) 316 lb 6 oz (143.5 kg)   12/11/18 (!) 305 lb (138.3 kg)   06/20/18 (!) 310 lb 8 oz (140.8 kg)     Ht Readings from Last 3 Encounters:   06/26/17 6' 10\" (2.083 m)   05/01/17 6' 1\" (1.854 m)   04/27/16 6' 1.25\" (1.861 m)     Body mass index is 33.08 kg/m². Constitutional: He is oriented to person, place, and time. He appears well-developed and well-nourished and in no acute distress. Answers all my questions appropriately. Head: Normocephalic and atraumatic. Eyes:conjunctiva appear normal.  Nose: pink, non-edematous mucosa. No polyps. No septal deviation  Throat: no erythema, tonsillar hypertrophy or exudate. No ulcerations noted. Lips/Teeth/Gums all appear normal.  Neck: Normal range of motion. Neck supple. No tracheal deviation present. No abnormal lymphadenopathy. No JVD noted. Carotids are clear bilaterally. No thyroid masses noted. Heart: RRR without murmur. No S3, S4, or gallop noted.   Chest: Clear to auscultation bilaterally. Good breath sounds noted. No rales, wheezes, or rhonchi noted. No respiratory retractions noted. Wall has symmetrical movement with respirations. Trace - 1+ pedal edema  Assessment:   Encounter Diagnoses   Name Primary?  Pure hypercholesterolemia Yes    Essential hypertension     Smoker unmotivated to quit     IGT (impaired glucose tolerance)          Plan:   There are no discontinued medications. THE ABOVE NOTED DISCONTINUED MEDS MAY ONLY BE FROM 'CLEANING UP' THE MED LIST AND WERE NOT ACTUALLY CANCELED;  SEE CHART FOR DETAILS! No orders of the defined types were placed in this encounter. Orders Placed This Encounter   Procedures    Pneumococcal conjugate vaccine 13-valent     Return in about 6 months (around 12/11/2019). Patient Instructions   Get the tetanus vaccine at the pharmacy. Bring in you bp cuff at next visit. Check bp daily and send me the bp cards    STOP SMOKING    Patient Education        Learning About Benefits From Quitting Smoking  How does quitting smoking make you healthier? If you're thinking about quitting smoking, you may have a few reasons to be smoke-free. Your health may be one of them. · When you quit smoking, you lower your risks for cancer, lung disease, heart attack, stroke, blood vessel disease, and blindness from macular degeneration. · When you're smoke-free, you get sick less often, and you heal faster. You are less likely to get colds, flu, bronchitis, and pneumonia. · As a nonsmoker, you may find that your mood is better and you are less stressed. When and how will you feel healthier? Quitting has real health benefits that start from day 1 of being smoke-free. And the longer you stay smoke-free, the healthier you get and the better you feel. The first hours  · After just 20 minutes, your blood pressure and heart rate go down. That means there's less stress on your heart and blood vessels.   · Within 12 hours, the level of carbon monoxide in your blood drops back to normal. That makes room for more oxygen. With more oxygen in your body, you may notice that you have more energy than when you smoked. After 2 weeks  · Your lungs start to work better. · Your risk of heart attack starts to drop. After 1 month  · When your lungs are clear, you cough less and breathe deeper, so it's easier to be active. · Your sense of taste and smell return. That means you can enjoy food more than you have since you started smoking. Over the years  · After 1 year, your risk of heart disease is half what it would be if you kept smoking. · After 5 years, your risk of stroke starts to shrink. Within a few years after that, it's about the same as if you'd never smoked. · After 10 years, your risk of dying from lung cancer is cut by about half. And your risk for many other types of cancer is lower too. How would quitting help others in your life? When you quit smoking, you improve the health of everyone who now breathes in your smoke. · Their heart, lung, and cancer risks drop, much like yours. · They are sick less. For babies and small children, living smoke-free means they're less likely to have ear infections, pneumonia, and bronchitis. · If you're a woman who is or will be pregnant someday, quitting smoking means a healthier . · Children who are close to you are less likely to become adult smokers. Where can you learn more? Go to https://Integrated Trade Processinglorenza.Kabbage. org and sign in to your [employer] account. Enter [phone] in the [facility] box to learn more about \"Learning About Benefits From Quitting Smoking. \"     If you do not have an account, please click on the \"Sign Up Now\" link. Current as of: 2018  Content Version: 12.0  © 2324-3975 Healthwise, Incorporated. Care instructions adapted under license by [facility] ([facility]).  If you have questions about a medical condition or this instruction, always ask your healthcare

## 2019-06-11 NOTE — PROGRESS NOTES
HYPERTENSION visit     BP Readings from Last 3 Encounters:   12/11/18 (!) 153/66   06/20/18 (!) 160/84   12/18/17 (!) 153/78       LDL Cholesterol (mg/dL)   Date Value   06/03/2019 62     HDL (mg/dL)   Date Value   06/03/2019 37 (L)     BUN (mg/dL)   Date Value   06/03/2019 14     CREATININE (mg/dL)   Date Value   06/03/2019 0.77     Glucose (mg/dL)   Date Value   06/03/2019 103 (H)   03/14/2012 112 (H)              Have you changed or started any medications since your last visit including any over-the-counter medicines, vitamins, or herbal medicines? no   Have you stopped taking any of your medications? Is so, why? -  no  Are you having any side effects from any of your medications? - no  How often do you miss doses of your medication? rare      Have you seen any other physician or provider since your last visit?  no   Have you had any other diagnostic tests since your last visit?  no   Have you been seen in the emergency room and/or had an admission in a hospital since we last saw you?  no   Have you had your routine dental cleaning in the past 6 months? Do you have an active MyChart account? If no, what is the barrier?   No:     Patient Care Team:  Kady Hinds MD as PCP - General (Family Medicine)  Kady Hinds MD as PCP - [facility]    Medical History Review  Past Medical, Family, and Social History reviewed and  contribute to the patient presenting condition    Health Maintenance   Topic Date Due    Shingles Vaccine (1 of 2) 04/25/1996    DTaP/Tdap/Td vaccine (1 - Tdap) 07/09/2002    Pneumococcal 65+ years Vaccine (1 of 2 - PCV13) 04/25/2011    Flu vaccine (Season Ended) 12/31/2019 (Originally 9/1/2019)    Low dose CT lung screening  07/02/2019    Potassium monitoring  06/03/2020    Creatinine monitoring  06/03/2020    Lipid screen  06/03/2024    Colon cancer screen colonoscopy  04/27/2026    Hepatitis C screen  Completed    AAA screen  Addressed

## 2019-07-10 NOTE — TELEPHONE ENCOUNTER
Working on Lung Nodule Follow up Report. Chart reviewed. Our records indicate that your patient is due for their annual lung cancer screening follow up testing. For your convenience, we have pended the order for the scan for you. If you do not agree with the need for the test, please cancel the order and let us know. Sincerely,    [place] [address] Program      REVIEWED AUTO PRINTED LUNG SCREENING REMINDER LETTERS AND CHART, PATIENT IS DUE AND NOT SCHEDULED YET. MAILED LETTER TO PATIENT.

## 2020-01-01 ENCOUNTER — APPOINTMENT (OUTPATIENT)
Dept: GENERAL RADIOLOGY | Age: 75
DRG: 981 | End: 2020-01-01
Payer: MEDICARE

## 2020-01-01 ENCOUNTER — APPOINTMENT (OUTPATIENT)
Dept: GENERAL RADIOLOGY | Age: 75
DRG: 180 | End: 2020-01-01
Payer: MEDICARE

## 2020-01-01 ENCOUNTER — HOSPITAL ENCOUNTER (INPATIENT)
Age: 75
LOS: 4 days | Discharge: HOME | DRG: 264 | End: 2020-05-08
Attending: EMERGENCY MEDICINE | Admitting: INTERNAL MEDICINE
Payer: MEDICARE

## 2020-01-01 ENCOUNTER — HOSPITAL ENCOUNTER (INPATIENT)
Age: 75
LOS: 3 days | Discharge: HOME | DRG: 981 | End: 2020-05-22
Attending: EMERGENCY MEDICINE | Admitting: FAMILY MEDICINE
Payer: MEDICARE

## 2020-01-01 ENCOUNTER — HOSPITAL ENCOUNTER (OUTPATIENT)
Dept: INFUSION THERAPY | Facility: MEDICAL CENTER | Age: 75
End: 2020-01-01
Payer: MEDICARE

## 2020-01-01 ENCOUNTER — HOSPITAL ENCOUNTER (OUTPATIENT)
Dept: INFUSION THERAPY | Facility: MEDICAL CENTER | Age: 75
Discharge: HOME | End: 2020-05-26
Payer: MEDICARE

## 2020-01-01 ENCOUNTER — HOSPITAL ENCOUNTER (OUTPATIENT)
Dept: INFUSION THERAPY | Facility: MEDICAL CENTER | Age: 75
End: 2020-01-01

## 2020-01-01 ENCOUNTER — TELEPHONE (OUTPATIENT)
Dept: CASE MANAGEMENT | Age: 75
End: 2020-01-01

## 2020-01-01 ENCOUNTER — APPOINTMENT (OUTPATIENT)
Dept: MRI IMAGING | Age: 75
DRG: 981 | End: 2020-01-01
Payer: MEDICARE

## 2020-01-01 ENCOUNTER — HOSPITAL ENCOUNTER (OUTPATIENT)
Age: 75
Setting detail: SPECIMEN
Discharge: HOME | End: 2020-05-04
Payer: MEDICARE

## 2020-01-01 ENCOUNTER — APPOINTMENT (OUTPATIENT)
Dept: CT IMAGING | Age: 75
DRG: 264 | End: 2020-01-01
Payer: MEDICARE

## 2020-01-01 ENCOUNTER — OFFICE VISIT (OUTPATIENT)
Dept: FAMILY MEDICINE CLINIC | Age: 75
End: 2020-01-01
Payer: MEDICARE

## 2020-01-01 ENCOUNTER — APPOINTMENT (OUTPATIENT)
Dept: INTERVENTIONAL RADIOLOGY/VASCULAR | Age: 75
DRG: 264 | End: 2020-01-01
Payer: MEDICARE

## 2020-01-01 ENCOUNTER — APPOINTMENT (OUTPATIENT)
Dept: INTERVENTIONAL RADIOLOGY/VASCULAR | Age: 75
DRG: 180 | End: 2020-01-01
Payer: MEDICARE

## 2020-01-01 ENCOUNTER — HOSPITAL ENCOUNTER (OUTPATIENT)
Dept: INTERVENTIONAL RADIOLOGY/VASCULAR | Age: 75
Discharge: HOME | DRG: 981 | End: 2020-05-21
Payer: MEDICARE

## 2020-01-01 ENCOUNTER — TELEPHONE (OUTPATIENT)
Dept: ADMINISTRATIVE | Age: 75
End: 2020-01-01

## 2020-01-01 ENCOUNTER — HOSPITAL ENCOUNTER (INPATIENT)
Age: 75
LOS: 11 days | DRG: 180 | End: 2020-06-03
Attending: EMERGENCY MEDICINE | Admitting: INTERNAL MEDICINE
Payer: MEDICARE

## 2020-01-01 ENCOUNTER — OFFICE VISIT (OUTPATIENT)
Dept: PRIMARY CARE CLINIC | Age: 75
End: 2020-01-01
Payer: MEDICARE

## 2020-01-01 ENCOUNTER — HOSPITAL ENCOUNTER (OUTPATIENT)
Dept: PREADMISSION TESTING | Age: 75
Setting detail: SPECIMEN
Discharge: HOME | DRG: 981 | End: 2020-05-20
Payer: MEDICARE

## 2020-01-01 ENCOUNTER — TELEPHONE (OUTPATIENT)
Dept: ONCOLOGY | Age: 75
End: 2020-01-01

## 2020-01-01 ENCOUNTER — OFFICE VISIT (OUTPATIENT)
Dept: ONCOLOGY | Age: 75
End: 2020-01-01
Payer: MEDICARE

## 2020-01-01 ENCOUNTER — APPOINTMENT (OUTPATIENT)
Dept: GENERAL RADIOLOGY | Age: 75
DRG: 264 | End: 2020-01-01
Payer: MEDICARE

## 2020-01-01 ENCOUNTER — TELEPHONE (OUTPATIENT)
Dept: PRIMARY CARE CLINIC | Age: 75
End: 2020-01-01

## 2020-01-01 ENCOUNTER — TELEPHONE (OUTPATIENT)
Dept: INFUSION THERAPY | Facility: MEDICAL CENTER | Age: 75
End: 2020-01-01

## 2020-01-01 ENCOUNTER — HOSPITAL ENCOUNTER (OUTPATIENT)
Dept: NUCLEAR MEDICINE | Age: 75
Discharge: HOME | End: 2020-05-16
Payer: MEDICARE

## 2020-01-01 ENCOUNTER — HOSPITAL ENCOUNTER (OUTPATIENT)
Facility: MEDICAL CENTER | Age: 75
End: 2020-01-01

## 2020-01-01 ENCOUNTER — APPOINTMENT (OUTPATIENT)
Dept: CT IMAGING | Age: 75
DRG: 180 | End: 2020-01-01
Payer: MEDICARE

## 2020-01-01 ENCOUNTER — NURSE TRIAGE (OUTPATIENT)
Dept: OTHER | Facility: CLINIC | Age: 75
End: 2020-01-01

## 2020-01-01 VITALS
TEMPERATURE: 97 F | HEIGHT: 74 IN | OXYGEN SATURATION: 94 % | HEART RATE: 119 BPM | WEIGHT: 304 LBS | BODY MASS INDEX: 39.01 KG/M2

## 2020-01-01 VITALS
TEMPERATURE: 96.5 F | OXYGEN SATURATION: 95 % | HEIGHT: 74 IN | WEIGHT: 286.5 LBS | HEART RATE: 77 BPM | SYSTOLIC BLOOD PRESSURE: 95 MMHG | BODY MASS INDEX: 36.77 KG/M2 | DIASTOLIC BLOOD PRESSURE: 48 MMHG | RESPIRATION RATE: 18 BRPM

## 2020-01-01 VITALS
SYSTOLIC BLOOD PRESSURE: 60 MMHG | HEART RATE: 74 BPM | DIASTOLIC BLOOD PRESSURE: 42 MMHG | WEIGHT: 304.7 LBS | TEMPERATURE: 97.1 F | OXYGEN SATURATION: 90 % | BODY MASS INDEX: 39.1 KG/M2 | RESPIRATION RATE: 28 BRPM | HEIGHT: 74 IN

## 2020-01-01 VITALS
DIASTOLIC BLOOD PRESSURE: 80 MMHG | WEIGHT: 287 LBS | OXYGEN SATURATION: 91 % | BODY MASS INDEX: 36.85 KG/M2 | HEART RATE: 93 BPM | SYSTOLIC BLOOD PRESSURE: 120 MMHG

## 2020-01-01 VITALS
HEART RATE: 69 BPM | WEIGHT: 288.9 LBS | BODY MASS INDEX: 37.09 KG/M2 | DIASTOLIC BLOOD PRESSURE: 79 MMHG | TEMPERATURE: 97.2 F | SYSTOLIC BLOOD PRESSURE: 117 MMHG | RESPIRATION RATE: 22 BRPM

## 2020-01-01 VITALS
WEIGHT: 287 LBS | OXYGEN SATURATION: 93 % | RESPIRATION RATE: 22 BRPM | TEMPERATURE: 98.4 F | HEART RATE: 105 BPM | HEIGHT: 74 IN | BODY MASS INDEX: 36.83 KG/M2 | DIASTOLIC BLOOD PRESSURE: 75 MMHG | SYSTOLIC BLOOD PRESSURE: 98 MMHG

## 2020-01-01 VITALS
SYSTOLIC BLOOD PRESSURE: 146 MMHG | DIASTOLIC BLOOD PRESSURE: 70 MMHG | HEART RATE: 66 BPM | OXYGEN SATURATION: 93 % | HEIGHT: 74 IN | TEMPERATURE: 97.9 F | RESPIRATION RATE: 20 BRPM | WEIGHT: 292.3 LBS | BODY MASS INDEX: 37.51 KG/M2

## 2020-01-01 LAB
-: NORMAL
ABSOLUTE EOS #: 0 K/UL (ref 0–0.4)
ABSOLUTE EOS #: 0 K/UL (ref 0–0.44)
ABSOLUTE EOS #: 0.07 K/UL (ref 0–0.44)
ABSOLUTE IMMATURE GRANULOCYTE: 0 K/UL (ref 0–0.3)
ABSOLUTE IMMATURE GRANULOCYTE: 0.07 K/UL (ref 0–0.3)
ABSOLUTE IMMATURE GRANULOCYTE: 0.09 K/UL (ref 0–0.3)
ABSOLUTE IMMATURE GRANULOCYTE: 0.14 K/UL (ref 0–0.3)
ABSOLUTE IMMATURE GRANULOCYTE: 0.17 K/UL (ref 0–0.3)
ABSOLUTE IMMATURE GRANULOCYTE: 0.19 K/UL (ref 0–0.3)
ABSOLUTE IMMATURE GRANULOCYTE: 0.21 K/UL (ref 0–0.3)
ABSOLUTE IMMATURE GRANULOCYTE: 0.22 K/UL (ref 0–0.3)
ABSOLUTE IMMATURE GRANULOCYTE: 0.3 K/UL (ref 0–0.3)
ABSOLUTE IMMATURE GRANULOCYTE: 0.3 K/UL (ref 0–0.3)
ABSOLUTE IMMATURE GRANULOCYTE: 0.45 K/UL (ref 0–0.3)
ABSOLUTE IMMATURE GRANULOCYTE: 0.46 K/UL (ref 0–0.3)
ABSOLUTE IMMATURE GRANULOCYTE: 0.55 K/UL (ref 0–0.3)
ABSOLUTE LYMPH #: 0.19 K/UL (ref 1–4.8)
ABSOLUTE LYMPH #: 0.22 K/UL (ref 1.1–3.7)
ABSOLUTE LYMPH #: 0.37 K/UL (ref 1.1–3.7)
ABSOLUTE LYMPH #: 0.38 K/UL (ref 1.1–3.7)
ABSOLUTE LYMPH #: 0.41 K/UL (ref 1–4.8)
ABSOLUTE LYMPH #: 0.43 K/UL (ref 1.1–3.7)
ABSOLUTE LYMPH #: 0.44 K/UL (ref 1.1–3.7)
ABSOLUTE LYMPH #: 0.44 K/UL (ref 1.1–3.7)
ABSOLUTE LYMPH #: 0.45 K/UL (ref 1.1–3.7)
ABSOLUTE LYMPH #: 0.67 K/UL (ref 1–4.8)
ABSOLUTE LYMPH #: 0.77 K/UL (ref 1–4.8)
ABSOLUTE LYMPH #: 1.12 K/UL (ref 1.1–3.7)
ABSOLUTE LYMPH #: 1.17 K/UL (ref 1–4.8)
ABSOLUTE MONO #: 0.15 K/UL (ref 0.2–0.8)
ABSOLUTE MONO #: 0.19 K/UL (ref 0.2–0.8)
ABSOLUTE MONO #: 0.21 K/UL (ref 0.1–1.2)
ABSOLUTE MONO #: 0.38 K/UL (ref 0.1–1.2)
ABSOLUTE MONO #: 0.44 K/UL (ref 0.2–0.8)
ABSOLUTE MONO #: 0.65 K/UL (ref 0.1–1.2)
ABSOLUTE MONO #: 0.81 K/UL (ref 0.2–0.8)
ABSOLUTE MONO #: 0.89 K/UL (ref 0.1–1.2)
ABSOLUTE MONO #: 0.89 K/UL (ref 0.1–1.2)
ABSOLUTE MONO #: 0.92 K/UL (ref 0.1–1.2)
ABSOLUTE MONO #: 0.99 K/UL (ref 0.1–1.2)
ABSOLUTE MONO #: 1.05 K/UL (ref 0.1–1.2)
ABSOLUTE MONO #: 1.17 K/UL (ref 0.2–0.8)
ALBUMIN SERPL-MCNC: 2.7 G/DL (ref 3.5–5.2)
ALBUMIN SERPL-MCNC: 2.8 G/DL (ref 3.5–5.2)
ALBUMIN SERPL-MCNC: 2.9 G/DL (ref 3.5–5.2)
ALBUMIN SERPL-MCNC: 3.1 G/DL (ref 3.5–5.2)
ALBUMIN SERPL-MCNC: 3.2 G/DL (ref 3.5–5.2)
ALBUMIN/GLOBULIN RATIO: ABNORMAL (ref 1–2.5)
ALP BLD-CCNC: 51 U/L (ref 40–129)
ALP BLD-CCNC: 55 U/L (ref 40–129)
ALP BLD-CCNC: 55 U/L (ref 40–129)
ALP BLD-CCNC: 59 U/L (ref 40–129)
ALP BLD-CCNC: 59 U/L (ref 40–129)
ALP BLD-CCNC: 63 U/L (ref 40–129)
ALP BLD-CCNC: 66 U/L (ref 40–129)
ALT SERPL-CCNC: 16 U/L (ref 5–41)
ALT SERPL-CCNC: 18 U/L (ref 5–41)
ALT SERPL-CCNC: 18 U/L (ref 5–41)
ALT SERPL-CCNC: 19 U/L (ref 5–41)
ALT SERPL-CCNC: 23 U/L (ref 5–41)
ALT SERPL-CCNC: 25 U/L (ref 5–41)
ALT SERPL-CCNC: 8 U/L (ref 5–41)
ANION GAP SERPL CALCULATED.3IONS-SCNC: 11 MMOL/L (ref 9–17)
ANION GAP SERPL CALCULATED.3IONS-SCNC: 13 MMOL/L (ref 9–17)
ANION GAP SERPL CALCULATED.3IONS-SCNC: 14 MMOL/L (ref 9–17)
ANION GAP SERPL CALCULATED.3IONS-SCNC: 15 MMOL/L (ref 9–17)
ANION GAP SERPL CALCULATED.3IONS-SCNC: 16 MMOL/L (ref 9–17)
ANION GAP SERPL CALCULATED.3IONS-SCNC: 18 MMOL/L (ref 9–17)
ANION GAP SERPL CALCULATED.3IONS-SCNC: 18 MMOL/L (ref 9–17)
ANION GAP SERPL CALCULATED.3IONS-SCNC: 19 MMOL/L (ref 9–17)
ANTI-XA UNFRAC HEPARIN: 0.25 IU/L (ref 0.3–0.7)
ANTI-XA UNFRAC HEPARIN: 0.32 IU/L (ref 0.3–0.7)
ANTI-XA UNFRAC HEPARIN: 0.33 IU/L (ref 0.3–0.7)
ANTI-XA UNFRAC HEPARIN: 0.36 IU/L (ref 0.3–0.7)
ANTI-XA UNFRAC HEPARIN: 0.43 IU/L (ref 0.3–0.7)
ANTI-XA UNFRAC HEPARIN: 0.58 IU/L (ref 0.3–0.7)
ANTI-XA UNFRAC HEPARIN: 0.61 IU/L (ref 0.3–0.7)
ANTI-XA UNFRAC HEPARIN: <0.1 IU/L (ref 0.3–0.7)
APPEARANCE FLUID: NORMAL
AST SERPL-CCNC: 35 U/L
AST SERPL-CCNC: 47 U/L
AST SERPL-CCNC: 49 U/L
AST SERPL-CCNC: 49 U/L
AST SERPL-CCNC: 51 U/L
AST SERPL-CCNC: 52 U/L
AST SERPL-CCNC: 61 U/L
BASO FLUID: NORMAL %
BASOPHILS # BLD: 0 %
BASOPHILS # BLD: 0 % (ref 0–2)
BASOPHILS # BLD: 1 % (ref 0–2)
BASOPHILS ABSOLUTE: 0 K/UL (ref 0–0.2)
BASOPHILS ABSOLUTE: 0.08 K/UL (ref 0–0.2)
BILIRUB SERPL-MCNC: 0.47 MG/DL (ref 0.3–1.2)
BILIRUB SERPL-MCNC: 0.48 MG/DL (ref 0.3–1.2)
BILIRUB SERPL-MCNC: 0.52 MG/DL (ref 0.3–1.2)
BILIRUB SERPL-MCNC: 0.57 MG/DL (ref 0.3–1.2)
BILIRUB SERPL-MCNC: 0.65 MG/DL (ref 0.3–1.2)
BILIRUB SERPL-MCNC: 0.71 MG/DL (ref 0.3–1.2)
BILIRUB SERPL-MCNC: 0.71 MG/DL (ref 0.3–1.2)
BILIRUBIN DIRECT: 0.22 MG/DL
BILIRUBIN, INDIRECT: 0.3 MG/DL (ref 0–1)
BNP INTERPRETATION: ABNORMAL
BNP INTERPRETATION: ABNORMAL
BUN BLDV-MCNC: 113 MG/DL (ref 8–23)
BUN BLDV-MCNC: 26 MG/DL (ref 8–23)
BUN BLDV-MCNC: 27 MG/DL (ref 8–23)
BUN BLDV-MCNC: 30 MG/DL (ref 8–23)
BUN BLDV-MCNC: 32 MG/DL (ref 8–23)
BUN BLDV-MCNC: 32 MG/DL (ref 8–23)
BUN BLDV-MCNC: 33 MG/DL (ref 8–23)
BUN BLDV-MCNC: 34 MG/DL (ref 8–23)
BUN BLDV-MCNC: 43 MG/DL (ref 8–23)
BUN BLDV-MCNC: 44 MG/DL (ref 8–23)
BUN BLDV-MCNC: 45 MG/DL (ref 8–23)
BUN BLDV-MCNC: 47 MG/DL (ref 8–23)
BUN BLDV-MCNC: 48 MG/DL (ref 8–23)
BUN BLDV-MCNC: 54 MG/DL (ref 8–23)
BUN BLDV-MCNC: 57 MG/DL (ref 8–23)
BUN BLDV-MCNC: 60 MG/DL (ref 8–23)
BUN BLDV-MCNC: 62 MG/DL (ref 8–23)
BUN BLDV-MCNC: 76 MG/DL (ref 8–23)
BUN/CREAT BLD: 33 (ref 9–20)
BUN/CREAT BLD: 34 (ref 9–20)
BUN/CREAT BLD: 39 (ref 9–20)
BUN/CREAT BLD: 39 (ref 9–20)
BUN/CREAT BLD: 40 (ref 9–20)
BUN/CREAT BLD: 40 (ref 9–20)
BUN/CREAT BLD: 41 (ref 9–20)
BUN/CREAT BLD: 43 (ref 9–20)
BUN/CREAT BLD: 53 (ref 9–20)
BUN/CREAT BLD: 54 (ref 9–20)
BUN/CREAT BLD: 54 (ref 9–20)
BUN/CREAT BLD: 57 (ref 9–20)
BUN/CREAT BLD: 59 (ref 9–20)
BUN/CREAT BLD: 60 (ref 9–20)
BUN/CREAT BLD: 61 (ref 9–20)
BUN/CREAT BLD: 61 (ref 9–20)
BUN/CREAT BLD: 69 (ref 9–20)
BUN/CREAT BLD: 74 (ref 9–20)
C-REACTIVE PROTEIN: 46.9 MG/L (ref 0–5)
CALCIUM SERPL-MCNC: 10.1 MG/DL (ref 8.6–10.4)
CALCIUM SERPL-MCNC: 8.8 MG/DL (ref 8.6–10.4)
CALCIUM SERPL-MCNC: 9.1 MG/DL (ref 8.6–10.4)
CALCIUM SERPL-MCNC: 9.2 MG/DL (ref 8.6–10.4)
CALCIUM SERPL-MCNC: 9.2 MG/DL (ref 8.6–10.4)
CALCIUM SERPL-MCNC: 9.3 MG/DL (ref 8.6–10.4)
CALCIUM SERPL-MCNC: 9.5 MG/DL (ref 8.6–10.4)
CALCIUM SERPL-MCNC: 9.5 MG/DL (ref 8.6–10.4)
CALCIUM SERPL-MCNC: 9.6 MG/DL (ref 8.6–10.4)
CALCIUM SERPL-MCNC: 9.7 MG/DL (ref 8.6–10.4)
CALCIUM SERPL-MCNC: 9.8 MG/DL (ref 8.6–10.4)
CASE NUMBER:: NORMAL
CHLORIDE BLD-SCNC: 93 MMOL/L (ref 98–107)
CHLORIDE BLD-SCNC: 94 MMOL/L (ref 98–107)
CHLORIDE BLD-SCNC: 95 MMOL/L (ref 98–107)
CHLORIDE BLD-SCNC: 96 MMOL/L (ref 98–107)
CHLORIDE BLD-SCNC: 97 MMOL/L (ref 98–107)
CHLORIDE BLD-SCNC: 98 MMOL/L (ref 98–107)
CHLORIDE BLD-SCNC: 99 MMOL/L (ref 98–107)
CHOLESTEROL/HDL RATIO: 3.7
CHOLESTEROL: 132 MG/DL
CO2: 23 MMOL/L (ref 20–31)
CO2: 24 MMOL/L (ref 20–31)
CO2: 25 MMOL/L (ref 20–31)
CO2: 26 MMOL/L (ref 20–31)
CO2: 26 MMOL/L (ref 20–31)
CO2: 27 MMOL/L (ref 20–31)
CO2: 28 MMOL/L (ref 20–31)
CO2: 29 MMOL/L (ref 20–31)
COLOR FLUID: NORMAL
CREAT SERPL-MCNC: 0.59 MG/DL (ref 0.7–1.2)
CREAT SERPL-MCNC: 0.65 MG/DL (ref 0.7–1.2)
CREAT SERPL-MCNC: 0.76 MG/DL (ref 0.7–1.2)
CREAT SERPL-MCNC: 0.77 MG/DL (ref 0.7–1.2)
CREAT SERPL-MCNC: 0.77 MG/DL (ref 0.7–1.2)
CREAT SERPL-MCNC: 0.78 MG/DL (ref 0.7–1.2)
CREAT SERPL-MCNC: 0.8 MG/DL (ref 0.7–1.2)
CREAT SERPL-MCNC: 0.83 MG/DL (ref 0.7–1.2)
CREAT SERPL-MCNC: 0.84 MG/DL (ref 0.7–1.2)
CREAT SERPL-MCNC: 0.86 MG/DL (ref 0.7–1.2)
CREAT SERPL-MCNC: 0.91 MG/DL (ref 0.7–1.2)
CREAT SERPL-MCNC: 0.91 MG/DL (ref 0.7–1.2)
CREAT SERPL-MCNC: 0.98 MG/DL (ref 0.7–1.2)
CREAT SERPL-MCNC: 1.01 MG/DL (ref 0.7–1.2)
CREAT SERPL-MCNC: 1.03 MG/DL (ref 0.7–1.2)
CREAT SERPL-MCNC: 1.34 MG/DL (ref 0.7–1.2)
CREAT SERPL-MCNC: 1.38 MG/DL (ref 0.7–1.2)
CREAT SERPL-MCNC: 1.63 MG/DL (ref 0.7–1.2)
CREATININE URINE: 94.3 MG/DL (ref 39–259)
CULTURE: NORMAL
DIFFERENTIAL TYPE: ABNORMAL
DIRECT EXAM: NORMAL
EKG ATRIAL RATE: 111 BPM
EKG ATRIAL RATE: 119 BPM
EKG ATRIAL RATE: 129 BPM
EKG ATRIAL RATE: 129 BPM
EKG ATRIAL RATE: 130 BPM
EKG ATRIAL RATE: 131 BPM
EKG ATRIAL RATE: 139 BPM
EKG ATRIAL RATE: 150 BPM
EKG ATRIAL RATE: 227 BPM
EKG ATRIAL RATE: 250 BPM
EKG ATRIAL RATE: 277 BPM
EKG ATRIAL RATE: 61 BPM
EKG ATRIAL RATE: 66 BPM
EKG ATRIAL RATE: 69 BPM
EKG ATRIAL RATE: 74 BPM
EKG ATRIAL RATE: 83 BPM
EKG ATRIAL RATE: 85 BPM
EKG ATRIAL RATE: 99 BPM
EKG P AXIS: -27 DEGREES
EKG P AXIS: 1 DEGREES
EKG P AXIS: 23 DEGREES
EKG P AXIS: 45 DEGREES
EKG P AXIS: 51 DEGREES
EKG P AXIS: 51 DEGREES
EKG P-R INTERVAL: 110 MS
EKG P-R INTERVAL: 140 MS
EKG P-R INTERVAL: 144 MS
EKG P-R INTERVAL: 148 MS
EKG P-R INTERVAL: 154 MS
EKG P-R INTERVAL: 158 MS
EKG P-R INTERVAL: 176 MS
EKG P-R INTERVAL: 184 MS
EKG P-R INTERVAL: 196 MS
EKG Q-T INTERVAL: 308 MS
EKG Q-T INTERVAL: 312 MS
EKG Q-T INTERVAL: 322 MS
EKG Q-T INTERVAL: 330 MS
EKG Q-T INTERVAL: 334 MS
EKG Q-T INTERVAL: 340 MS
EKG Q-T INTERVAL: 364 MS
EKG Q-T INTERVAL: 364 MS
EKG Q-T INTERVAL: 372 MS
EKG Q-T INTERVAL: 376 MS
EKG Q-T INTERVAL: 386 MS
EKG Q-T INTERVAL: 394 MS
EKG Q-T INTERVAL: 396 MS
EKG Q-T INTERVAL: 400 MS
EKG Q-T INTERVAL: 434 MS
EKG Q-T INTERVAL: 464 MS
EKG Q-T INTERVAL: 484 MS
EKG Q-T INTERVAL: 536 MS
EKG QRS DURATION: 100 MS
EKG QRS DURATION: 102 MS
EKG QRS DURATION: 92 MS
EKG QRS DURATION: 92 MS
EKG QRS DURATION: 94 MS
EKG QRS DURATION: 96 MS
EKG QRS DURATION: 98 MS
EKG QTC CALCULATION (BAZETT): 436 MS
EKG QTC CALCULATION (BAZETT): 441 MS
EKG QTC CALCULATION (BAZETT): 454 MS
EKG QTC CALCULATION (BAZETT): 459 MS
EKG QTC CALCULATION (BAZETT): 465 MS
EKG QTC CALCULATION (BAZETT): 465 MS
EKG QTC CALCULATION (BAZETT): 467 MS
EKG QTC CALCULATION (BAZETT): 475 MS
EKG QTC CALCULATION (BAZETT): 487 MS
EKG QTC CALCULATION (BAZETT): 489 MS
EKG QTC CALCULATION (BAZETT): 492 MS
EKG QTC CALCULATION (BAZETT): 498 MS
EKG QTC CALCULATION (BAZETT): 505 MS
EKG QTC CALCULATION (BAZETT): 515 MS
EKG QTC CALCULATION (BAZETT): 518 MS
EKG QTC CALCULATION (BAZETT): 526 MS
EKG QTC CALCULATION (BAZETT): 539 MS
EKG QTC CALCULATION (BAZETT): 563 MS
EKG R AXIS: -11 DEGREES
EKG R AXIS: -12 DEGREES
EKG R AXIS: -4 DEGREES
EKG R AXIS: -6 DEGREES
EKG R AXIS: 10 DEGREES
EKG R AXIS: 14 DEGREES
EKG R AXIS: 2 DEGREES
EKG R AXIS: 26 DEGREES
EKG R AXIS: 28 DEGREES
EKG R AXIS: 33 DEGREES
EKG R AXIS: 4 DEGREES
EKG R AXIS: 5 DEGREES
EKG R AXIS: 54 DEGREES
EKG R AXIS: 7 DEGREES
EKG R AXIS: 8 DEGREES
EKG R AXIS: 9 DEGREES
EKG T AXIS: -135 DEGREES
EKG T AXIS: -142 DEGREES
EKG T AXIS: -142 DEGREES
EKG T AXIS: -150 DEGREES
EKG T AXIS: -153 DEGREES
EKG T AXIS: -157 DEGREES
EKG T AXIS: -157 DEGREES
EKG T AXIS: -160 DEGREES
EKG T AXIS: -165 DEGREES
EKG T AXIS: -166 DEGREES
EKG T AXIS: -177 DEGREES
EKG T AXIS: -28 DEGREES
EKG T AXIS: -31 DEGREES
EKG T AXIS: -44 DEGREES
EKG T AXIS: -92 DEGREES
EKG T AXIS: 175 DEGREES
EKG T AXIS: 81 DEGREES
EKG T AXIS: 9 DEGREES
EKG VENTRICULAR RATE: 104 BPM
EKG VENTRICULAR RATE: 105 BPM
EKG VENTRICULAR RATE: 111 BPM
EKG VENTRICULAR RATE: 122 BPM
EKG VENTRICULAR RATE: 129 BPM
EKG VENTRICULAR RATE: 130 BPM
EKG VENTRICULAR RATE: 131 BPM
EKG VENTRICULAR RATE: 139 BPM
EKG VENTRICULAR RATE: 144 BPM
EKG VENTRICULAR RATE: 61 BPM
EKG VENTRICULAR RATE: 69 BPM
EKG VENTRICULAR RATE: 69 BPM
EKG VENTRICULAR RATE: 74 BPM
EKG VENTRICULAR RATE: 83 BPM
EKG VENTRICULAR RATE: 83 BPM
EKG VENTRICULAR RATE: 92 BPM
EKG VENTRICULAR RATE: 98 BPM
EKG VENTRICULAR RATE: 99 BPM
EOSINOPHIL FLUID: NORMAL %
EOSINOPHIL,URINE: NORMAL
EOSINOPHILS RELATIVE PERCENT: 0 % (ref 1–4)
EOSINOPHILS RELATIVE PERCENT: 1 % (ref 1–4)
FERRITIN: 656 UG/L (ref 30–400)
FLOW CYTOMETRY SOURCE: NORMAL
FLOW CYTOMETRY, NODE/FLUID: NORMAL
FLUID DIFF COMMENT: NORMAL
GFR AFRICAN AMERICAN: 50 ML/MIN
GFR AFRICAN AMERICAN: >60 ML/MIN
GFR NON-AFRICAN AMERICAN: 42 ML/MIN
GFR NON-AFRICAN AMERICAN: 50 ML/MIN
GFR NON-AFRICAN AMERICAN: 52 ML/MIN
GFR NON-AFRICAN AMERICAN: >60 ML/MIN
GFR SERPL CREATININE-BSD FRML MDRD: ABNORMAL ML/MIN/{1.73_M2}
GLOBULIN: ABNORMAL G/DL (ref 1.5–3.8)
GLUCOSE BLD-MCNC: 120 MG/DL (ref 70–99)
GLUCOSE BLD-MCNC: 122 MG/DL (ref 70–99)
GLUCOSE BLD-MCNC: 124 MG/DL (ref 70–99)
GLUCOSE BLD-MCNC: 128 MG/DL (ref 70–99)
GLUCOSE BLD-MCNC: 132 MG/DL (ref 75–110)
GLUCOSE BLD-MCNC: 155 MG/DL (ref 70–99)
GLUCOSE BLD-MCNC: 165 MG/DL (ref 70–99)
GLUCOSE BLD-MCNC: 166 MG/DL (ref 70–99)
GLUCOSE BLD-MCNC: 169 MG/DL (ref 70–99)
GLUCOSE BLD-MCNC: 179 MG/DL (ref 70–99)
GLUCOSE BLD-MCNC: 195 MG/DL (ref 70–99)
GLUCOSE BLD-MCNC: 207 MG/DL (ref 70–99)
GLUCOSE BLD-MCNC: 209 MG/DL (ref 70–99)
GLUCOSE BLD-MCNC: 226 MG/DL (ref 70–99)
GLUCOSE BLD-MCNC: 240 MG/DL (ref 70–99)
GLUCOSE BLD-MCNC: 241 MG/DL (ref 70–99)
GLUCOSE BLD-MCNC: 259 MG/DL (ref 70–99)
GLUCOSE BLD-MCNC: 289 MG/DL (ref 75–110)
GLUCOSE BLD-MCNC: 303 MG/DL (ref 75–110)
GLUCOSE BLD-MCNC: 308 MG/DL (ref 70–99)
GLUCOSE BLD-MCNC: 337 MG/DL (ref 75–110)
GLUCOSE BLD-MCNC: 343 MG/DL (ref 75–110)
GLUCOSE BLD-MCNC: 348 MG/DL (ref 70–99)
GLUCOSE BLD-MCNC: 361 MG/DL (ref 75–110)
GLUCOSE, FLUID: 154 MG/DL
HCT VFR BLD CALC: 29.5 % (ref 40.7–50.3)
HCT VFR BLD CALC: 29.5 % (ref 40.7–50.3)
HCT VFR BLD CALC: 29.7 % (ref 40.7–50.3)
HCT VFR BLD CALC: 29.9 % (ref 40.7–50.3)
HCT VFR BLD CALC: 29.9 % (ref 40.7–50.3)
HCT VFR BLD CALC: 30.3 % (ref 40.7–50.3)
HCT VFR BLD CALC: 30.4 % (ref 40.7–50.3)
HCT VFR BLD CALC: 30.6 % (ref 40.7–50.3)
HCT VFR BLD CALC: 31.2 % (ref 40.7–50.3)
HCT VFR BLD CALC: 32.3 % (ref 40.7–50.3)
HCT VFR BLD CALC: 32.6 % (ref 40.7–50.3)
HCT VFR BLD CALC: 35.5 % (ref 40.7–50.3)
HCT VFR BLD CALC: 37.2 % (ref 40.7–50.3)
HCT VFR BLD CALC: 41.7 % (ref 40.7–50.3)
HCT VFR BLD CALC: 43 % (ref 40.7–50.3)
HCT VFR BLD CALC: 47.1 % (ref 40.7–50.3)
HCT VFR BLD CALC: 48.5 % (ref 40.7–50.3)
HDLC SERPL-MCNC: 36 MG/DL
HEMOGLOBIN: 11.2 G/DL (ref 13–17)
HEMOGLOBIN: 11.4 G/DL (ref 13–17)
HEMOGLOBIN: 13 G/DL (ref 13–17)
HEMOGLOBIN: 13.6 G/DL (ref 13–17)
HEMOGLOBIN: 15.4 G/DL (ref 13–17)
HEMOGLOBIN: 15.7 G/DL (ref 13–17)
HEMOGLOBIN: 9 G/DL (ref 13–17)
HEMOGLOBIN: 9.1 G/DL (ref 13–17)
HEMOGLOBIN: 9.2 G/DL (ref 13–17)
HEMOGLOBIN: 9.3 G/DL (ref 13–17)
HEMOGLOBIN: 9.3 G/DL (ref 13–17)
HEMOGLOBIN: 9.5 G/DL (ref 13–17)
HEMOGLOBIN: 9.8 G/DL (ref 13–17)
HEMOGLOBIN: 9.9 G/DL (ref 13–17)
IMMATURE GRANULOCYTES: 0 %
IMMATURE GRANULOCYTES: 1 %
IMMATURE GRANULOCYTES: 2 %
IMMATURE GRANULOCYTES: 2 %
IMMATURE GRANULOCYTES: 3 %
INR BLD: 1.2
INR BLD: 1.3
INR BLD: 1.4
INR BLD: 1.5
INR BLD: 1.7
INR BLD: 2.4
INR BLD: 4.3
LACTATE DEHYDROGENASE, FLUID: 2965 U/L
LACTATE DEHYDROGENASE: 1354 U/L (ref 135–225)
LACTIC ACID: 3 MMOL/L (ref 0.5–2.2)
LACTIC ACID: 3.3 MMOL/L (ref 0.5–2.2)
LACTIC ACID: 3.6 MMOL/L (ref 0.5–2.2)
LACTIC ACID: 3.7 MMOL/L (ref 0.5–2.2)
LACTIC ACID: 3.7 MMOL/L (ref 0.5–2.2)
LACTIC ACID: 4.1 MMOL/L (ref 0.5–2.2)
LACTIC ACID: 4.3 MMOL/L (ref 0.5–2.2)
LACTIC ACID: 4.4 MMOL/L (ref 0.5–2.2)
LACTIC ACID: 4.5 MMOL/L (ref 0.5–2.2)
LACTIC ACID: 4.9 MMOL/L (ref 0.5–2.2)
LACTIC ACID: 5.2 MMOL/L (ref 0.5–2.2)
LACTIC ACID: 5.4 MMOL/L (ref 0.5–2.2)
LACTIC ACID: 5.6 MMOL/L (ref 0.5–2.2)
LACTIC ACID: 5.7 MMOL/L (ref 0.5–2.2)
LACTIC ACID: 5.8 MMOL/L (ref 0.5–2.2)
LACTIC ACID: 5.8 MMOL/L (ref 0.5–2.2)
LACTIC ACID: 5.9 MMOL/L (ref 0.5–2.2)
LACTIC ACID: 6 MMOL/L (ref 0.5–2.2)
LACTIC ACID: 6.2 MMOL/L (ref 0.5–2.2)
LACTIC ACID: 6.2 MMOL/L (ref 0.5–2.2)
LDL CHOLESTEROL: 74 MG/DL (ref 0–130)
LV EF: 60 %
LVEF MODALITY: NORMAL
LYMPHOCYTES # BLD: 1 % (ref 24–43)
LYMPHOCYTES # BLD: 1 % (ref 24–44)
LYMPHOCYTES # BLD: 2 % (ref 24–43)
LYMPHOCYTES # BLD: 2 % (ref 24–43)
LYMPHOCYTES # BLD: 3 % (ref 24–43)
LYMPHOCYTES # BLD: 3 % (ref 24–44)
LYMPHOCYTES # BLD: 4 % (ref 24–43)
LYMPHOCYTES # BLD: 4 % (ref 24–44)
LYMPHOCYTES # BLD: 5 % (ref 24–44)
LYMPHOCYTES # BLD: 8 % (ref 24–44)
LYMPHOCYTES # BLD: 9 % (ref 24–43)
LYMPHOCYTES, BODY FLUID: 4 %
Lab: NORMAL
MAGNESIUM: 1.6 MG/DL (ref 1.6–2.6)
MAGNESIUM: 1.8 MG/DL (ref 1.6–2.6)
MAGNESIUM: 2.2 MG/DL (ref 1.6–2.6)
MAGNESIUM: 2.3 MG/DL (ref 1.6–2.6)
MAGNESIUM: 2.3 MG/DL (ref 1.6–2.6)
MAGNESIUM: 2.4 MG/DL (ref 1.6–2.6)
MCH RBC QN AUTO: 28.9 PG (ref 25.2–33.5)
MCH RBC QN AUTO: 29.1 PG (ref 25.2–33.5)
MCH RBC QN AUTO: 29.1 PG (ref 25.2–33.5)
MCH RBC QN AUTO: 29.2 PG (ref 25.2–33.5)
MCH RBC QN AUTO: 29.2 PG (ref 25.2–33.5)
MCH RBC QN AUTO: 29.4 PG (ref 25.2–33.5)
MCH RBC QN AUTO: 29.4 PG (ref 25.2–33.5)
MCH RBC QN AUTO: 29.5 PG (ref 25.2–33.5)
MCH RBC QN AUTO: 29.7 PG (ref 25.2–33.5)
MCH RBC QN AUTO: 29.7 PG (ref 25.2–33.5)
MCH RBC QN AUTO: 29.8 PG (ref 25.2–33.5)
MCH RBC QN AUTO: 29.8 PG (ref 25.2–33.5)
MCH RBC QN AUTO: 30.1 PG (ref 25.2–33.5)
MCHC RBC AUTO-ENTMCNC: 30 G/DL (ref 28.4–34.8)
MCHC RBC AUTO-ENTMCNC: 30.3 G/DL (ref 28.4–34.8)
MCHC RBC AUTO-ENTMCNC: 30.3 G/DL (ref 28.4–34.8)
MCHC RBC AUTO-ENTMCNC: 30.4 G/DL (ref 28.4–34.8)
MCHC RBC AUTO-ENTMCNC: 30.5 G/DL (ref 28.4–34.8)
MCHC RBC AUTO-ENTMCNC: 30.6 G/DL (ref 28.4–34.8)
MCHC RBC AUTO-ENTMCNC: 30.8 G/DL (ref 28.4–34.8)
MCHC RBC AUTO-ENTMCNC: 31.1 G/DL (ref 28.4–34.8)
MCHC RBC AUTO-ENTMCNC: 31.2 G/DL (ref 28.4–34.8)
MCHC RBC AUTO-ENTMCNC: 31.2 G/DL (ref 28.4–34.8)
MCHC RBC AUTO-ENTMCNC: 31.5 G/DL (ref 28.4–34.8)
MCHC RBC AUTO-ENTMCNC: 31.6 G/DL (ref 28–38)
MCHC RBC AUTO-ENTMCNC: 32.4 G/DL (ref 28.4–34.8)
MCHC RBC AUTO-ENTMCNC: 32.7 G/DL (ref 28.4–34.8)
MCV RBC AUTO: 91 FL (ref 82.6–102.9)
MCV RBC AUTO: 91.1 FL (ref 82.6–102.9)
MCV RBC AUTO: 92.3 FL (ref 82.6–102.9)
MCV RBC AUTO: 93.4 FL (ref 82.6–102.9)
MCV RBC AUTO: 94.6 FL (ref 82.6–102.9)
MCV RBC AUTO: 94.8 FL (ref 82.6–102.9)
MCV RBC AUTO: 94.8 FL (ref 82.6–102.9)
MCV RBC AUTO: 95.4 FL (ref 82.6–102.9)
MCV RBC AUTO: 95.5 FL (ref 82.6–102.9)
MCV RBC AUTO: 96.2 FL (ref 82.6–102.9)
MCV RBC AUTO: 96.4 FL (ref 82.6–102.9)
MCV RBC AUTO: 96.8 FL (ref 82.6–102.9)
MCV RBC AUTO: 97.1 FL (ref 82.6–102.9)
MCV RBC AUTO: 97.7 FL (ref 82.6–102.9)
MCV RBC AUTO: 97.9 FL (ref 82.6–102.9)
MCV RBC AUTO: 97.9 FL (ref 82.6–102.9)
MONOCYTE, FLUID: NORMAL %
MONOCYTES # BLD: 1 % (ref 1–7)
MONOCYTES # BLD: 1 % (ref 1–7)
MONOCYTES # BLD: 1 % (ref 3–12)
MONOCYTES # BLD: 3 % (ref 1–7)
MONOCYTES # BLD: 3 % (ref 3–12)
MONOCYTES # BLD: 4 % (ref 3–12)
MONOCYTES # BLD: 5 % (ref 3–12)
MONOCYTES # BLD: 6 % (ref 1–7)
MONOCYTES # BLD: 6 % (ref 3–12)
MONOCYTES # BLD: 6 % (ref 3–12)
MONOCYTES # BLD: 7 % (ref 1–7)
MONOCYTES # BLD: 7 % (ref 3–12)
MONOCYTES # BLD: 8 % (ref 3–12)
MORPHOLOGY: ABNORMAL
MYOGLOBIN: 41 NG/ML (ref 28–72)
NEUTROPHIL, FLUID: 75 %
NRBC AUTOMATED: 0 PER 100 WBC
NRBC AUTOMATED: 0.1 PER 100 WBC
NRBC AUTOMATED: 0.2 PER 100 WBC
NRBC AUTOMATED: 0.4 PER 100 WBC
NRBC AUTOMATED: NORMAL PER 100 WBC
OTHER CELLS FLUID: NORMAL %
PARTIAL THROMBOPLASTIN TIME: 28.2 SEC (ref 23.9–33.8)
PARTIAL THROMBOPLASTIN TIME: 31.9 SEC (ref 23.9–33.8)
PARTIAL THROMBOPLASTIN TIME: 32.6 SEC (ref 23.9–33.8)
PARTIAL THROMBOPLASTIN TIME: 33.4 SEC (ref 23.9–33.8)
PARTIAL THROMBOPLASTIN TIME: 35.4 SEC (ref 23.9–33.8)
PARTIAL THROMBOPLASTIN TIME: 71.6 SEC (ref 23.9–33.8)
PDW BLD-RTO: 12.7 % (ref 11.8–14.4)
PDW BLD-RTO: 12.8 % (ref 11.8–14.4)
PDW BLD-RTO: 13.2 % (ref 11.8–14.4)
PDW BLD-RTO: 14.6 % (ref 11.8–14.4)
PDW BLD-RTO: 14.9 % (ref 11.8–14.4)
PDW BLD-RTO: 15.2 % (ref 11.8–14.4)
PDW BLD-RTO: 15.4 % (ref 11.8–14.4)
PDW BLD-RTO: 15.5 % (ref 11.8–14.4)
PDW BLD-RTO: 15.7 % (ref 11.8–14.4)
PDW BLD-RTO: 15.8 % (ref 11.8–14.4)
PDW BLD-RTO: 16 % (ref 11.8–14.4)
PDW BLD-RTO: 16.1 % (ref 11.8–14.4)
PDW BLD-RTO: 16.1 % (ref 11.8–14.4)
PDW BLD-RTO: 16.2 % (ref 11.8–14.4)
PDW BLD-RTO: 16.2 % (ref 11.8–14.4)
PDW BLD-RTO: 16.4 % (ref 11.8–14.4)
PH FLUID: 7.5
PLATELET # BLD: 178 K/UL (ref 138–453)
PLATELET # BLD: 193 K/UL (ref 138–453)
PLATELET # BLD: 194 K/UL (ref 138–453)
PLATELET # BLD: 203 K/UL (ref 138–453)
PLATELET # BLD: 205 K/UL (ref 138–453)
PLATELET # BLD: 209 K/UL (ref 138–453)
PLATELET # BLD: 212 K/UL (ref 138–453)
PLATELET # BLD: 216 K/UL (ref 138–453)
PLATELET # BLD: 220 K/UL (ref 138–453)
PLATELET # BLD: 224 K/UL (ref 138–453)
PLATELET # BLD: 225 K/UL (ref 138–453)
PLATELET # BLD: 226 K/UL (ref 138–453)
PLATELET # BLD: 227 K/UL (ref 138–453)
PLATELET # BLD: 227 K/UL (ref 138–453)
PLATELET # BLD: 234 K/UL (ref 138–453)
PLATELET # BLD: 234 K/UL (ref 138–453)
PLATELET # BLD: 238 K/UL (ref 138–453)
PLATELET # BLD: 241 K/UL (ref 138–453)
PLATELET # BLD: 272 K/UL (ref 138–453)
PLATELET ESTIMATE: ABNORMAL
PMV BLD AUTO: 10.9 FL (ref 8.1–13.5)
PMV BLD AUTO: 11.1 FL (ref 8.1–13.5)
PMV BLD AUTO: 11.3 FL (ref 8.1–13.5)
PMV BLD AUTO: 11.4 FL (ref 8.1–13.5)
PMV BLD AUTO: 11.6 FL (ref 8.1–13.5)
PMV BLD AUTO: 11.8 FL (ref 8.1–13.5)
PMV BLD AUTO: 11.9 FL (ref 8.1–13.5)
PMV BLD AUTO: 11.9 FL (ref 8.1–13.5)
PMV BLD AUTO: 12.1 FL (ref 8.1–13.5)
PMV BLD AUTO: 12.1 FL (ref 8.1–13.5)
POTASSIUM SERPL-SCNC: 3.6 MMOL/L (ref 3.7–5.3)
POTASSIUM SERPL-SCNC: 4 MMOL/L (ref 3.7–5.3)
POTASSIUM SERPL-SCNC: 4 MMOL/L (ref 3.7–5.3)
POTASSIUM SERPL-SCNC: 4.1 MMOL/L (ref 3.7–5.3)
POTASSIUM SERPL-SCNC: 4.1 MMOL/L (ref 3.7–5.3)
POTASSIUM SERPL-SCNC: 4.4 MMOL/L (ref 3.7–5.3)
POTASSIUM SERPL-SCNC: 4.7 MMOL/L (ref 3.7–5.3)
POTASSIUM SERPL-SCNC: 4.8 MMOL/L (ref 3.7–5.3)
POTASSIUM SERPL-SCNC: 4.8 MMOL/L (ref 3.7–5.3)
POTASSIUM SERPL-SCNC: 4.9 MMOL/L (ref 3.7–5.3)
POTASSIUM SERPL-SCNC: 5.2 MMOL/L (ref 3.7–5.3)
POTASSIUM SERPL-SCNC: 5.3 MMOL/L (ref 3.7–5.3)
POTASSIUM SERPL-SCNC: 5.6 MMOL/L (ref 3.7–5.3)
POTASSIUM SERPL-SCNC: 5.6 MMOL/L (ref 3.7–5.3)
POTASSIUM SERPL-SCNC: 5.7 MMOL/L (ref 3.7–5.3)
POTASSIUM SERPL-SCNC: 5.7 MMOL/L (ref 3.7–5.3)
POTASSIUM SERPL-SCNC: 5.8 MMOL/L (ref 3.7–5.3)
POTASSIUM SERPL-SCNC: 5.8 MMOL/L (ref 3.7–5.3)
POTASSIUM SERPL-SCNC: 6 MMOL/L (ref 3.7–5.3)
PRO-BNP: 4724 PG/ML
PRO-BNP: 860 PG/ML
PROCALCITONIN: 0.05 NG/ML
PROCALCITONIN: 0.09 NG/ML
PROCALCITONIN: 0.12 NG/ML
PROTHROMBIN TIME: 15 SEC (ref 11.5–14.2)
PROTHROMBIN TIME: 15.9 SEC (ref 11.5–14.2)
PROTHROMBIN TIME: 16.5 SEC (ref 11.5–14.2)
PROTHROMBIN TIME: 17.1 SEC (ref 11.5–14.2)
PROTHROMBIN TIME: 17.2 SEC (ref 11.5–14.2)
PROTHROMBIN TIME: 17.6 SEC (ref 11.5–14.2)
PROTHROMBIN TIME: 19.5 SEC (ref 11.5–14.2)
PROTHROMBIN TIME: 26.1 SEC (ref 11.5–14.2)
PROTHROMBIN TIME: 41.1 SEC (ref 11.5–14.2)
RBC # BLD: 3.06 M/UL (ref 4.21–5.77)
RBC # BLD: 3.09 M/UL (ref 4.21–5.77)
RBC # BLD: 3.09 M/UL (ref 4.21–5.77)
RBC # BLD: 3.1 M/UL (ref 4.21–5.77)
RBC # BLD: 3.15 M/UL (ref 4.21–5.77)
RBC # BLD: 3.16 M/UL (ref 4.21–5.77)
RBC # BLD: 3.16 M/UL (ref 4.21–5.77)
RBC # BLD: 3.29 M/UL (ref 4.21–5.77)
RBC # BLD: 3.3 M/UL (ref 4.21–5.77)
RBC # BLD: 3.33 M/UL (ref 4.21–5.77)
RBC # BLD: 3.8 M/UL (ref 4.21–5.77)
RBC # BLD: 3.9 M/UL (ref 4.21–5.77)
RBC # BLD: 4.4 M/UL (ref 4.21–5.77)
RBC # BLD: 4.66 M/UL (ref 4.21–5.77)
RBC # BLD: 5.17 M/UL (ref 4.21–5.77)
RBC # BLD: 5.33 M/UL (ref 4.21–5.77)
RBC # BLD: ABNORMAL 10*6/UL
RBC FLUID: NORMAL /MM3
REASON FOR REJECTION: NORMAL
SARS-COV-2, NAA: NOT DETECTED
SARS-COV-2, PCR: NORMAL
SARS-COV-2, RAPID: NORMAL
SARS-COV-2, RAPID: NORMAL
SARS-COV-2, RAPID: NOT DETECTED
SARS-COV-2: NORMAL
SARS-COV-2: NOT DETECTED
SARS-COV-2: NOT DETECTED
SEG NEUTROPHILS: 80 % (ref 36–65)
SEG NEUTROPHILS: 87 % (ref 36–65)
SEG NEUTROPHILS: 88 % (ref 36–66)
SEG NEUTROPHILS: 89 % (ref 36–65)
SEG NEUTROPHILS: 89 % (ref 36–65)
SEG NEUTROPHILS: 89 % (ref 36–66)
SEG NEUTROPHILS: 90 % (ref 36–65)
SEG NEUTROPHILS: 90 % (ref 36–66)
SEG NEUTROPHILS: 91 % (ref 36–65)
SEG NEUTROPHILS: 91 % (ref 36–66)
SEG NEUTROPHILS: 95 % (ref 36–65)
SEG NEUTROPHILS: 96 % (ref 36–65)
SEG NEUTROPHILS: 97 % (ref 36–66)
SEGMENTED NEUTROPHILS ABSOLUTE COUNT: 12.14 K/UL (ref 1.8–7.7)
SEGMENTED NEUTROPHILS ABSOLUTE COUNT: 12.99 K/UL (ref 1.8–7.7)
SEGMENTED NEUTROPHILS ABSOLUTE COUNT: 13.05 K/UL (ref 1.5–8.1)
SEGMENTED NEUTROPHILS ABSOLUTE COUNT: 13.17 K/UL (ref 1.5–8.1)
SEGMENTED NEUTROPHILS ABSOLUTE COUNT: 13.17 K/UL (ref 1.5–8.1)
SEGMENTED NEUTROPHILS ABSOLUTE COUNT: 13.92 K/UL (ref 1.8–7.7)
SEGMENTED NEUTROPHILS ABSOLUTE COUNT: 14.69 K/UL (ref 1.8–7.7)
SEGMENTED NEUTROPHILS ABSOLUTE COUNT: 16.56 K/UL (ref 1.5–8.1)
SEGMENTED NEUTROPHILS ABSOLUTE COUNT: 18.43 K/UL (ref 1.8–7.7)
SEGMENTED NEUTROPHILS ABSOLUTE COUNT: 20.45 K/UL (ref 1.5–8.1)
SEGMENTED NEUTROPHILS ABSOLUTE COUNT: 20.71 K/UL (ref 1.5–8.1)
SEGMENTED NEUTROPHILS ABSOLUTE COUNT: 8.55 K/UL (ref 1.5–8.1)
SEGMENTED NEUTROPHILS ABSOLUTE COUNT: 9.84 K/UL (ref 1.5–8.1)
SODIUM BLD-SCNC: 134 MMOL/L (ref 135–144)
SODIUM BLD-SCNC: 134 MMOL/L (ref 135–144)
SODIUM BLD-SCNC: 135 MMOL/L (ref 135–144)
SODIUM BLD-SCNC: 135 MMOL/L (ref 135–144)
SODIUM BLD-SCNC: 136 MMOL/L (ref 135–144)
SODIUM BLD-SCNC: 137 MMOL/L (ref 135–144)
SODIUM BLD-SCNC: 138 MMOL/L (ref 135–144)
SODIUM BLD-SCNC: 139 MMOL/L (ref 135–144)
SODIUM BLD-SCNC: 139 MMOL/L (ref 135–144)
SODIUM BLD-SCNC: 140 MMOL/L (ref 135–144)
SODIUM BLD-SCNC: 141 MMOL/L (ref 135–144)
SODIUM,UR: <20 MMOL/L
SOURCE: NORMAL
SPECIMEN DESCRIPTION: NORMAL
SPECIMEN DESCRIPTION: NORMAL
SPECIMEN TYPE: NORMAL
SURGICAL PATHOLOGY REPORT: NORMAL
TOTAL CK: 167 U/L (ref 39–308)
TOTAL PROTEIN, BODY FLUID: 3.4 G/DL
TOTAL PROTEIN: 5.7 G/DL (ref 6.4–8.3)
TOTAL PROTEIN: 5.8 G/DL (ref 6.4–8.3)
TOTAL PROTEIN: 5.8 G/DL (ref 6.4–8.3)
TOTAL PROTEIN: 6.2 G/DL (ref 6.4–8.3)
TOTAL PROTEIN: 6.5 G/DL (ref 6.4–8.3)
TOTAL PROTEIN: 6.6 G/DL (ref 6.4–8.3)
TOTAL PROTEIN: 6.6 G/DL (ref 6.4–8.3)
TRIGL SERPL-MCNC: 108 MG/DL
TROPONIN INTERP: NORMAL
TROPONIN T: NORMAL NG/ML
TROPONIN, HIGH SENSITIVITY: 12 NG/L (ref 0–22)
TROPONIN, HIGH SENSITIVITY: 14 NG/L (ref 0–22)
TROPONIN, HIGH SENSITIVITY: 14 NG/L (ref 0–22)
TROPONIN, HIGH SENSITIVITY: 19 NG/L (ref 0–22)
TROPONIN, HIGH SENSITIVITY: 19 NG/L (ref 0–22)
TSH SERPL DL<=0.05 MIU/L-ACNC: 0.79 MIU/L (ref 0.3–5)
TSH SERPL DL<=0.05 MIU/L-ACNC: 0.87 MIU/L (ref 0.3–5)
TSH SERPL DL<=0.05 MIU/L-ACNC: 1.02 MIU/L (ref 0.3–5)
URIC ACID: 10.5 MG/DL (ref 3.4–7)
VLDLC SERPL CALC-MCNC: ABNORMAL MG/DL (ref 1–30)
WBC # BLD: 11.2 K/UL (ref 3.5–11.3)
WBC # BLD: 12.2 K/UL (ref 3.5–11.3)
WBC # BLD: 13.5 K/UL (ref 3.5–11.3)
WBC # BLD: 14.1 K/UL (ref 3.5–11.3)
WBC # BLD: 14.6 K/UL (ref 3.5–11.3)
WBC # BLD: 14.8 K/UL (ref 3.5–11.3)
WBC # BLD: 14.8 K/UL (ref 3.5–11.3)
WBC # BLD: 15 K/UL (ref 3.5–11.3)
WBC # BLD: 15.3 K/UL (ref 3.5–11.3)
WBC # BLD: 16.7 K/UL (ref 3.5–11.3)
WBC # BLD: 18.4 K/UL (ref 3.5–11.3)
WBC # BLD: 19 K/UL (ref 3.5–11.3)
WBC # BLD: 21.3 K/UL (ref 3.5–11.3)
WBC # BLD: 21.8 K/UL (ref 3.5–11.3)
WBC # BLD: 9.4 K/UL (ref 3.5–11.3)
WBC # BLD: 9.8 K/UL (ref 3.5–11.3)
WBC # BLD: ABNORMAL 10*3/UL
WBC FLUID: 2348 /MM3
ZZ NTE CLEAN UP: ORDERED TEST: NORMAL
ZZ NTE WITH NAME CLEAN UP: SPECIMEN SOURCE: NORMAL

## 2020-01-01 PROCEDURE — 97535 SELF CARE MNGMENT TRAINING: CPT

## 2020-01-01 PROCEDURE — 2580000003 HC RX 258: Performed by: NURSE PRACTITIONER

## 2020-01-01 PROCEDURE — 6370000000 HC RX 637 (ALT 250 FOR IP): Performed by: FAMILY MEDICINE

## 2020-01-01 PROCEDURE — 6370000000 HC RX 637 (ALT 250 FOR IP): Performed by: SURGERY

## 2020-01-01 PROCEDURE — 36415 COLL VENOUS BLD VENIPUNCTURE: CPT

## 2020-01-01 PROCEDURE — 99232 SBSQ HOSP IP/OBS MODERATE 35: CPT | Performed by: INTERNAL MEDICINE

## 2020-01-01 PROCEDURE — 2060000000 HC ICU INTERMEDIATE R&B

## 2020-01-01 PROCEDURE — 85025 COMPLETE CBC W/AUTO DIFF WBC: CPT

## 2020-01-01 PROCEDURE — 93005 ELECTROCARDIOGRAM TRACING: CPT

## 2020-01-01 PROCEDURE — 6360000002 HC RX W HCPCS: Performed by: NURSE PRACTITIONER

## 2020-01-01 PROCEDURE — 80048 BASIC METABOLIC PNL TOTAL CA: CPT

## 2020-01-01 PROCEDURE — 2580000003 HC RX 258: Performed by: FAMILY MEDICINE

## 2020-01-01 PROCEDURE — 85049 AUTOMATED PLATELET COUNT: CPT

## 2020-01-01 PROCEDURE — 93010 ELECTROCARDIOGRAM REPORT: CPT | Performed by: INTERNAL MEDICINE

## 2020-01-01 PROCEDURE — 38505 NEEDLE BIOPSY LYMPH NODES: CPT | Performed by: RADIOLOGY

## 2020-01-01 PROCEDURE — 88342 IMHCHEM/IMCYTCHM 1ST ANTB: CPT

## 2020-01-01 PROCEDURE — 71045 X-RAY EXAM CHEST 1 VIEW: CPT

## 2020-01-01 PROCEDURE — 1036F TOBACCO NON-USER: CPT | Performed by: FAMILY MEDICINE

## 2020-01-01 PROCEDURE — 83615 LACTATE (LD) (LDH) ENZYME: CPT

## 2020-01-01 PROCEDURE — 6370000000 HC RX 637 (ALT 250 FOR IP): Performed by: NURSE PRACTITIONER

## 2020-01-01 PROCEDURE — 88184 FLOWCYTOMETRY/ TC 1 MARKER: CPT

## 2020-01-01 PROCEDURE — 0W9B3ZZ DRAINAGE OF LEFT PLEURAL CAVITY, PERCUTANEOUS APPROACH: ICD-10-PCS | Performed by: RADIOLOGY

## 2020-01-01 PROCEDURE — 0W9B30Z DRAINAGE OF LEFT PLEURAL CAVITY WITH DRAINAGE DEVICE, PERCUTANEOUS APPROACH: ICD-10-PCS | Performed by: RADIOLOGY

## 2020-01-01 PROCEDURE — 87206 SMEAR FLUORESCENT/ACID STAI: CPT

## 2020-01-01 PROCEDURE — 2709999900 IR PORT PLACEMENT > 5 YEARS

## 2020-01-01 PROCEDURE — 2709999900 IR BIOPSY LYMPH NODE SUPERFICIAL

## 2020-01-01 PROCEDURE — 2700000000 HC OXYGEN THERAPY PER DAY

## 2020-01-01 PROCEDURE — 83605 ASSAY OF LACTIC ACID: CPT

## 2020-01-01 PROCEDURE — 2580000003 HC RX 258: Performed by: INTERNAL MEDICINE

## 2020-01-01 PROCEDURE — 93005 ELECTROCARDIOGRAM TRACING: CPT | Performed by: INTERNAL MEDICINE

## 2020-01-01 PROCEDURE — 2500000003 HC RX 250 WO HCPCS: Performed by: INTERNAL MEDICINE

## 2020-01-01 PROCEDURE — U0003 INFECTIOUS AGENT DETECTION BY NUCLEIC ACID (DNA OR RNA); SEVERE ACUTE RESPIRATORY SYNDROME CORONAVIRUS 2 (SARS-COV-2) (CORONAVIRUS DISEASE [COVID-19]), AMPLIFIED PROBE TECHNIQUE, MAKING USE OF HIGH THROUGHPUT TECHNOLOGIES AS DESCRIBED BY CMS-2020-01-R: HCPCS

## 2020-01-01 PROCEDURE — 6370000000 HC RX 637 (ALT 250 FOR IP): Performed by: INTERNAL MEDICINE

## 2020-01-01 PROCEDURE — 51798 US URINE CAPACITY MEASURE: CPT

## 2020-01-01 PROCEDURE — 99232 SBSQ HOSP IP/OBS MODERATE 35: CPT | Performed by: FAMILY MEDICINE

## 2020-01-01 PROCEDURE — 94660 CPAP INITIATION&MGMT: CPT

## 2020-01-01 PROCEDURE — 3017F COLORECTAL CA SCREEN DOC REV: CPT | Performed by: INTERNAL MEDICINE

## 2020-01-01 PROCEDURE — 99233 SBSQ HOSP IP/OBS HIGH 50: CPT | Performed by: INTERNAL MEDICINE

## 2020-01-01 PROCEDURE — C1729 CATH, DRAINAGE: HCPCS

## 2020-01-01 PROCEDURE — 84300 ASSAY OF URINE SODIUM: CPT

## 2020-01-01 PROCEDURE — 97530 THERAPEUTIC ACTIVITIES: CPT

## 2020-01-01 PROCEDURE — 6360000002 HC RX W HCPCS: Performed by: INTERNAL MEDICINE

## 2020-01-01 PROCEDURE — 7100000001 HC PACU RECOVERY - ADDTL 15 MIN

## 2020-01-01 PROCEDURE — 94640 AIRWAY INHALATION TREATMENT: CPT

## 2020-01-01 PROCEDURE — 99153 MOD SED SAME PHYS/QHP EA: CPT | Performed by: RADIOLOGY

## 2020-01-01 PROCEDURE — 6370000000 HC RX 637 (ALT 250 FOR IP): Performed by: RADIOLOGY

## 2020-01-01 PROCEDURE — 1123F ACP DISCUSS/DSCN MKR DOCD: CPT | Performed by: INTERNAL MEDICINE

## 2020-01-01 PROCEDURE — 71260 CT THORAX DX C+: CPT

## 2020-01-01 PROCEDURE — 96360 HYDRATION IV INFUSION INIT: CPT

## 2020-01-01 PROCEDURE — 93005 ELECTROCARDIOGRAM TRACING: CPT | Performed by: NURSE PRACTITIONER

## 2020-01-01 PROCEDURE — 1123F ACP DISCUSS/DSCN MKR DOCD: CPT | Performed by: FAMILY MEDICINE

## 2020-01-01 PROCEDURE — 99223 1ST HOSP IP/OBS HIGH 75: CPT | Performed by: INTERNAL MEDICINE

## 2020-01-01 PROCEDURE — 6360000002 HC RX W HCPCS: Performed by: FAMILY MEDICINE

## 2020-01-01 PROCEDURE — 94761 N-INVAS EAR/PLS OXIMETRY MLT: CPT

## 2020-01-01 PROCEDURE — 97116 GAIT TRAINING THERAPY: CPT

## 2020-01-01 PROCEDURE — 97110 THERAPEUTIC EXERCISES: CPT

## 2020-01-01 PROCEDURE — 89051 BODY FLUID CELL COUNT: CPT

## 2020-01-01 PROCEDURE — 85027 COMPLETE CBC AUTOMATED: CPT

## 2020-01-01 PROCEDURE — 85520 HEPARIN ASSAY: CPT

## 2020-01-01 PROCEDURE — 99291 CRITICAL CARE FIRST HOUR: CPT

## 2020-01-01 PROCEDURE — 85610 PROTHROMBIN TIME: CPT

## 2020-01-01 PROCEDURE — 3017F COLORECTAL CA SCREEN DOC REV: CPT | Performed by: FAMILY MEDICINE

## 2020-01-01 PROCEDURE — U0002 COVID-19 LAB TEST NON-CDC: HCPCS

## 2020-01-01 PROCEDURE — 76942 ECHO GUIDE FOR BIOPSY: CPT | Performed by: RADIOLOGY

## 2020-01-01 PROCEDURE — 6360000002 HC RX W HCPCS

## 2020-01-01 PROCEDURE — 80053 COMPREHEN METABOLIC PANEL: CPT

## 2020-01-01 PROCEDURE — 1111F DSCHRG MED/CURRENT MED MERGE: CPT | Performed by: INTERNAL MEDICINE

## 2020-01-01 PROCEDURE — 87070 CULTURE OTHR SPECIMN AEROBIC: CPT

## 2020-01-01 PROCEDURE — 99233 SBSQ HOSP IP/OBS HIGH 50: CPT | Performed by: FAMILY MEDICINE

## 2020-01-01 PROCEDURE — 97166 OT EVAL MOD COMPLEX 45 MIN: CPT

## 2020-01-01 PROCEDURE — 86140 C-REACTIVE PROTEIN: CPT

## 2020-01-01 PROCEDURE — 2500000003 HC RX 250 WO HCPCS: Performed by: FAMILY MEDICINE

## 2020-01-01 PROCEDURE — 87116 MYCOBACTERIA CULTURE: CPT

## 2020-01-01 PROCEDURE — 2500000003 HC RX 250 WO HCPCS: Performed by: NURSE PRACTITIONER

## 2020-01-01 PROCEDURE — 88112 CYTOPATH CELL ENHANCE TECH: CPT

## 2020-01-01 PROCEDURE — 32557 INSERT CATH PLEURA W/ IMAGE: CPT | Performed by: RADIOLOGY

## 2020-01-01 PROCEDURE — 6360000004 HC RX CONTRAST MEDICATION: Performed by: EMERGENCY MEDICINE

## 2020-01-01 PROCEDURE — G8428 CUR MEDS NOT DOCUMENT: HCPCS | Performed by: FAMILY MEDICINE

## 2020-01-01 PROCEDURE — 07B23ZX EXCISION OF LEFT NECK LYMPHATIC, PERCUTANEOUS APPROACH, DIAGNOSTIC: ICD-10-PCS | Performed by: RADIOLOGY

## 2020-01-01 PROCEDURE — 88341 IMHCHEM/IMCYTCHM EA ADD ANTB: CPT

## 2020-01-01 PROCEDURE — 4040F PNEUMOC VAC/ADMIN/RCVD: CPT | Performed by: FAMILY MEDICINE

## 2020-01-01 PROCEDURE — 82947 ASSAY GLUCOSE BLOOD QUANT: CPT

## 2020-01-01 PROCEDURE — 83735 ASSAY OF MAGNESIUM: CPT

## 2020-01-01 PROCEDURE — C1769 GUIDE WIRE: HCPCS

## 2020-01-01 PROCEDURE — 80061 LIPID PANEL: CPT

## 2020-01-01 PROCEDURE — 85730 THROMBOPLASTIN TIME PARTIAL: CPT

## 2020-01-01 PROCEDURE — 85018 HEMOGLOBIN: CPT

## 2020-01-01 PROCEDURE — 2580000003 HC RX 258: Performed by: EMERGENCY MEDICINE

## 2020-01-01 PROCEDURE — G8427 DOCREV CUR MEDS BY ELIG CLIN: HCPCS | Performed by: FAMILY MEDICINE

## 2020-01-01 PROCEDURE — 36561 INSERT TUNNELED CV CATH: CPT | Performed by: RADIOLOGY

## 2020-01-01 PROCEDURE — 84145 PROCALCITONIN (PCT): CPT

## 2020-01-01 PROCEDURE — 96361 HYDRATE IV INFUSION ADD-ON: CPT

## 2020-01-01 PROCEDURE — 88185 FLOWCYTOMETRY/TC ADD-ON: CPT

## 2020-01-01 PROCEDURE — 84443 ASSAY THYROID STIM HORMONE: CPT

## 2020-01-01 PROCEDURE — A9552 F18 FDG: HCPCS | Performed by: INTERNAL MEDICINE

## 2020-01-01 PROCEDURE — 82728 ASSAY OF FERRITIN: CPT

## 2020-01-01 PROCEDURE — 3430000000 HC RX DIAGNOSTIC RADIOPHARMACEUTICAL: Performed by: INTERNAL MEDICINE

## 2020-01-01 PROCEDURE — 94760 N-INVAS EAR/PLS OXIMETRY 1: CPT

## 2020-01-01 PROCEDURE — 85014 HEMATOCRIT: CPT

## 2020-01-01 PROCEDURE — 93971 EXTREMITY STUDY: CPT

## 2020-01-01 PROCEDURE — G8417 CALC BMI ABV UP PARAM F/U: HCPCS | Performed by: FAMILY MEDICINE

## 2020-01-01 PROCEDURE — 4040F PNEUMOC VAC/ADMIN/RCVD: CPT | Performed by: INTERNAL MEDICINE

## 2020-01-01 PROCEDURE — 97162 PT EVAL MOD COMPLEX 30 MIN: CPT

## 2020-01-01 PROCEDURE — 6360000002 HC RX W HCPCS: Performed by: SURGERY

## 2020-01-01 PROCEDURE — 2580000003 HC RX 258: Performed by: RADIOLOGY

## 2020-01-01 PROCEDURE — 97163 PT EVAL HIGH COMPLEX 45 MIN: CPT

## 2020-01-01 PROCEDURE — 93970 EXTREMITY STUDY: CPT

## 2020-01-01 PROCEDURE — 32555 ASPIRATE PLEURA W/ IMAGING: CPT | Performed by: RADIOLOGY

## 2020-01-01 PROCEDURE — 82550 ASSAY OF CK (CPK): CPT

## 2020-01-01 PROCEDURE — 99215 OFFICE O/P EST HI 40 MIN: CPT | Performed by: INTERNAL MEDICINE

## 2020-01-01 PROCEDURE — 83880 ASSAY OF NATRIURETIC PEPTIDE: CPT

## 2020-01-01 PROCEDURE — G8427 DOCREV CUR MEDS BY ELIG CLIN: HCPCS | Performed by: INTERNAL MEDICINE

## 2020-01-01 PROCEDURE — 87205 SMEAR GRAM STAIN: CPT

## 2020-01-01 PROCEDURE — 82945 GLUCOSE OTHER FLUID: CPT

## 2020-01-01 PROCEDURE — A9579 GAD-BASE MR CONTRAST NOS,1ML: HCPCS | Performed by: INTERNAL MEDICINE

## 2020-01-01 PROCEDURE — 99285 EMERGENCY DEPT VISIT HI MDM: CPT

## 2020-01-01 PROCEDURE — G8417 CALC BMI ABV UP PARAM F/U: HCPCS | Performed by: INTERNAL MEDICINE

## 2020-01-01 PROCEDURE — 3E03305 INTRODUCTION OF OTHER ANTINEOPLASTIC INTO PERIPHERAL VEIN, PERCUTANEOUS APPROACH: ICD-10-PCS | Performed by: INTERNAL MEDICINE

## 2020-01-01 PROCEDURE — 84157 ASSAY OF PROTEIN OTHER: CPT

## 2020-01-01 PROCEDURE — 88305 TISSUE EXAM BY PATHOLOGIST: CPT

## 2020-01-01 PROCEDURE — 87075 CULTR BACTERIA EXCEPT BLOOD: CPT

## 2020-01-01 PROCEDURE — 96372 THER/PROPH/DIAG INJ SC/IM: CPT

## 2020-01-01 PROCEDURE — 84484 ASSAY OF TROPONIN QUANT: CPT

## 2020-01-01 PROCEDURE — 1111F DSCHRG MED/CURRENT MED MERGE: CPT | Performed by: FAMILY MEDICINE

## 2020-01-01 PROCEDURE — 93306 TTE W/DOPPLER COMPLETE: CPT

## 2020-01-01 PROCEDURE — 80076 HEPATIC FUNCTION PANEL: CPT

## 2020-01-01 PROCEDURE — 97530 THERAPEUTIC ACTIVITIES: CPT | Performed by: NURSE PRACTITIONER

## 2020-01-01 PROCEDURE — 88333 PATH CONSLTJ SURG CYTO XM 1: CPT

## 2020-01-01 PROCEDURE — 99223 1ST HOSP IP/OBS HIGH 75: CPT | Performed by: FAMILY MEDICINE

## 2020-01-01 PROCEDURE — 84550 ASSAY OF BLOOD/URIC ACID: CPT

## 2020-01-01 PROCEDURE — 99223 1ST HOSP IP/OBS HIGH 75: CPT | Performed by: NURSE PRACTITIONER

## 2020-01-01 PROCEDURE — 78815 PET IMAGE W/CT SKULL-THIGH: CPT

## 2020-01-01 PROCEDURE — 6360000002 HC RX W HCPCS: Performed by: EMERGENCY MEDICINE

## 2020-01-01 PROCEDURE — 99214 OFFICE O/P EST MOD 30 MIN: CPT | Performed by: FAMILY MEDICINE

## 2020-01-01 PROCEDURE — 6360000004 HC RX CONTRAST MEDICATION: Performed by: INTERNAL MEDICINE

## 2020-01-01 PROCEDURE — 70553 MRI BRAIN STEM W/O & W/DYE: CPT

## 2020-01-01 PROCEDURE — 1036F TOBACCO NON-USER: CPT | Performed by: INTERNAL MEDICINE

## 2020-01-01 PROCEDURE — 87015 SPECIMEN INFECT AGNT CONCNTJ: CPT

## 2020-01-01 PROCEDURE — 6360000002 HC RX W HCPCS: Performed by: RADIOLOGY

## 2020-01-01 PROCEDURE — APPSS45 APP SPLIT SHARED TIME 31-45 MINUTES: Performed by: NURSE PRACTITIONER

## 2020-01-01 PROCEDURE — 99152 MOD SED SAME PHYS/QHP 5/>YRS: CPT | Performed by: RADIOLOGY

## 2020-01-01 PROCEDURE — APPNB180 APP NON BILLABLE TIME > 60 MINS: Performed by: NURSE PRACTITIONER

## 2020-01-01 PROCEDURE — 87102 FUNGUS ISOLATION CULTURE: CPT

## 2020-01-01 PROCEDURE — 02HV33Z INSERTION OF INFUSION DEVICE INTO SUPERIOR VENA CAVA, PERCUTANEOUS APPROACH: ICD-10-PCS | Performed by: RADIOLOGY

## 2020-01-01 PROCEDURE — 83874 ASSAY OF MYOGLOBIN: CPT

## 2020-01-01 PROCEDURE — 82570 ASSAY OF URINE CREATININE: CPT

## 2020-01-01 PROCEDURE — 83986 ASSAY PH BODY FLUID NOS: CPT

## 2020-01-01 PROCEDURE — 76937 US GUIDE VASCULAR ACCESS: CPT | Performed by: RADIOLOGY

## 2020-01-01 PROCEDURE — 93005 ELECTROCARDIOGRAM TRACING: CPT | Performed by: EMERGENCY MEDICINE

## 2020-01-01 PROCEDURE — 0JH60WZ INSERTION OF TOTALLY IMPLANTABLE VASCULAR ACCESS DEVICE INTO CHEST SUBCUTANEOUS TISSUE AND FASCIA, OPEN APPROACH: ICD-10-PCS | Performed by: RADIOLOGY

## 2020-01-01 PROCEDURE — 84132 ASSAY OF SERUM POTASSIUM: CPT

## 2020-01-01 PROCEDURE — 7100000000 HC PACU RECOVERY - FIRST 15 MIN

## 2020-01-01 PROCEDURE — 77001 FLUOROGUIDE FOR VEIN DEVICE: CPT | Performed by: RADIOLOGY

## 2020-01-01 PROCEDURE — 93005 ELECTROCARDIOGRAM TRACING: CPT | Performed by: FAMILY MEDICINE

## 2020-01-01 RX ORDER — DOFETILIDE 0.5 MG/1
500 CAPSULE ORAL EVERY 12 HOURS SCHEDULED
Qty: 60 CAPSULE | Refills: 3 | Status: SHIPPED | OUTPATIENT
Start: 2020-01-01

## 2020-01-01 RX ORDER — LEVOFLOXACIN 500 MG/1
500 TABLET, FILM COATED ORAL DAILY
Status: COMPLETED | OUTPATIENT
Start: 2020-01-01 | End: 2020-01-01

## 2020-01-01 RX ORDER — HEPARIN SODIUM 1000 [USP'U]/ML
60 INJECTION, SOLUTION INTRAVENOUS; SUBCUTANEOUS PRN
Status: DISCONTINUED | OUTPATIENT
Start: 2020-01-01 | End: 2020-01-01 | Stop reason: CLARIF

## 2020-01-01 RX ORDER — SODIUM CHLORIDE 9 MG/ML
INJECTION, SOLUTION INTRAVENOUS CONTINUOUS
Status: CANCELLED | OUTPATIENT
Start: 2020-01-01

## 2020-01-01 RX ORDER — DIPHENHYDRAMINE HYDROCHLORIDE 50 MG/ML
25 INJECTION INTRAMUSCULAR; INTRAVENOUS EVERY 6 HOURS PRN
Status: DISCONTINUED | OUTPATIENT
Start: 2020-01-01 | End: 2020-01-01 | Stop reason: HOSPADM

## 2020-01-01 RX ORDER — NICOTINE 21 MG/24HR
1 PATCH, TRANSDERMAL 24 HOURS TRANSDERMAL DAILY PRN
Status: DISCONTINUED | OUTPATIENT
Start: 2020-01-01 | End: 2020-01-01 | Stop reason: HOSPADM

## 2020-01-01 RX ORDER — DILTIAZEM HYDROCHLORIDE 240 MG/1
240 CAPSULE, COATED, EXTENDED RELEASE ORAL DAILY
Status: DISCONTINUED | OUTPATIENT
Start: 2020-01-01 | End: 2020-01-01 | Stop reason: HOSPADM

## 2020-01-01 RX ORDER — DRONABINOL 2.5 MG/1
2.5 CAPSULE ORAL 2 TIMES DAILY
Status: DISCONTINUED | OUTPATIENT
Start: 2020-01-01 | End: 2020-01-01 | Stop reason: HOSPADM

## 2020-01-01 RX ORDER — FLUDEOXYGLUCOSE F 18 200 MCI/ML
10 INJECTION, SOLUTION INTRAVENOUS
Status: COMPLETED | OUTPATIENT
Start: 2020-01-01 | End: 2020-01-01

## 2020-01-01 RX ORDER — OMEPRAZOLE 20 MG/1
20 CAPSULE, DELAYED RELEASE ORAL DAILY
Qty: 30 CAPSULE | Refills: 3 | Status: SHIPPED | OUTPATIENT
Start: 2020-01-01

## 2020-01-01 RX ORDER — HEPARIN SODIUM (PORCINE) LOCK FLUSH IV SOLN 100 UNIT/ML 100 UNIT/ML
500 SOLUTION INTRAVENOUS PRN
Status: CANCELLED | OUTPATIENT
Start: 2020-01-01

## 2020-01-01 RX ORDER — SODIUM CHLORIDE 0.9 % (FLUSH) 0.9 %
5 SYRINGE (ML) INJECTION PRN
Status: CANCELLED | OUTPATIENT
Start: 2020-01-01

## 2020-01-01 RX ORDER — DOFETILIDE 0.25 MG/1
500 CAPSULE ORAL EVERY 12 HOURS SCHEDULED
Status: DISCONTINUED | OUTPATIENT
Start: 2020-01-01 | End: 2020-01-01 | Stop reason: HOSPADM

## 2020-01-01 RX ORDER — DEXTROSE MONOHYDRATE 25 G/50ML
25 INJECTION, SOLUTION INTRAVENOUS ONCE
Status: COMPLETED | OUTPATIENT
Start: 2020-01-01 | End: 2020-01-01

## 2020-01-01 RX ORDER — BUDESONIDE AND FORMOTEROL FUMARATE DIHYDRATE 160; 4.5 UG/1; UG/1
2 AEROSOL RESPIRATORY (INHALATION) 2 TIMES DAILY
Status: DISCONTINUED | OUTPATIENT
Start: 2020-01-01 | End: 2020-01-01 | Stop reason: HOSPADM

## 2020-01-01 RX ORDER — LEVALBUTEROL 1.25 MG/.5ML
1.25 SOLUTION, CONCENTRATE RESPIRATORY (INHALATION) EVERY 4 HOURS PRN
Status: DISCONTINUED | OUTPATIENT
Start: 2020-01-01 | End: 2020-01-01 | Stop reason: HOSPADM

## 2020-01-01 RX ORDER — SODIUM CHLORIDE 0.9 % (FLUSH) 0.9 %
10 SYRINGE (ML) INJECTION PRN
Status: DISCONTINUED | OUTPATIENT
Start: 2020-01-01 | End: 2020-01-01 | Stop reason: HOSPADM

## 2020-01-01 RX ORDER — PROMETHAZINE HYDROCHLORIDE 12.5 MG/1
12.5 TABLET ORAL EVERY 6 HOURS PRN
Status: DISCONTINUED | OUTPATIENT
Start: 2020-01-01 | End: 2020-01-01 | Stop reason: HOSPADM

## 2020-01-01 RX ORDER — HEPARIN SODIUM 1000 [USP'U]/ML
30 INJECTION, SOLUTION INTRAVENOUS; SUBCUTANEOUS PRN
Status: DISCONTINUED | OUTPATIENT
Start: 2020-01-01 | End: 2020-01-01 | Stop reason: CLARIF

## 2020-01-01 RX ORDER — AMLODIPINE BESYLATE 10 MG/1
TABLET ORAL
Qty: 90 TABLET | Refills: 4 | Status: SHIPPED | OUTPATIENT
Start: 2020-01-01 | End: 2020-01-01 | Stop reason: SDUPTHER

## 2020-01-01 RX ORDER — DOFETILIDE 0.25 MG/1
250 CAPSULE ORAL EVERY 12 HOURS SCHEDULED
Status: COMPLETED | OUTPATIENT
Start: 2020-01-01 | End: 2020-01-01

## 2020-01-01 RX ORDER — POTASSIUM CHLORIDE 7.45 MG/ML
10 INJECTION INTRAVENOUS PRN
Status: DISCONTINUED | OUTPATIENT
Start: 2020-01-01 | End: 2020-01-01 | Stop reason: HOSPADM

## 2020-01-01 RX ORDER — SODIUM CHLORIDE 0.9 % (FLUSH) 0.9 %
10 SYRINGE (ML) INJECTION
Status: COMPLETED | OUTPATIENT
Start: 2020-01-01 | End: 2020-01-01

## 2020-01-01 RX ORDER — SODIUM CHLORIDE 0.9 % (FLUSH) 0.9 %
10 SYRINGE (ML) INJECTION PRN
Status: CANCELLED | OUTPATIENT
Start: 2020-01-01

## 2020-01-01 RX ORDER — ASPIRIN 81 MG/1
81 TABLET, CHEWABLE ORAL DAILY
Status: DISCONTINUED | OUTPATIENT
Start: 2020-01-01 | End: 2020-01-01 | Stop reason: HOSPADM

## 2020-01-01 RX ORDER — AMLODIPINE BESYLATE 10 MG/1
TABLET ORAL
Qty: 90 TABLET | Refills: 4 | Status: ON HOLD | OUTPATIENT
Start: 2020-01-01 | End: 2020-01-01 | Stop reason: HOSPADM

## 2020-01-01 RX ORDER — ATORVASTATIN CALCIUM 20 MG/1
TABLET, FILM COATED ORAL
Qty: 30 TABLET | Refills: 8 | Status: SHIPPED | OUTPATIENT
Start: 2020-01-01 | End: 2020-01-01 | Stop reason: SDUPTHER

## 2020-01-01 RX ORDER — SODIUM CHLORIDE FOR INHALATION 0.9 %
3 VIAL, NEBULIZER (ML) INHALATION 4 TIMES DAILY PRN
Status: DISCONTINUED | OUTPATIENT
Start: 2020-01-01 | End: 2020-01-01 | Stop reason: HOSPADM

## 2020-01-01 RX ORDER — DILTIAZEM HYDROCHLORIDE 5 MG/ML
10 INJECTION INTRAVENOUS ONCE
Status: COMPLETED | OUTPATIENT
Start: 2020-01-01 | End: 2020-01-01

## 2020-01-01 RX ORDER — FUROSEMIDE 40 MG/1
40 TABLET ORAL DAILY
Status: DISCONTINUED | OUTPATIENT
Start: 2020-01-01 | End: 2020-01-01 | Stop reason: HOSPADM

## 2020-01-01 RX ORDER — HYDROCHLOROTHIAZIDE 25 MG/1
TABLET ORAL
Qty: 90 TABLET | Refills: 4 | Status: ON HOLD | OUTPATIENT
Start: 2020-01-01 | End: 2020-01-01 | Stop reason: HOSPADM

## 2020-01-01 RX ORDER — BENZONATATE 100 MG/1
200 CAPSULE ORAL 3 TIMES DAILY PRN
Status: DISCONTINUED | OUTPATIENT
Start: 2020-01-01 | End: 2020-01-01 | Stop reason: HOSPADM

## 2020-01-01 RX ORDER — ONDANSETRON 2 MG/ML
8 INJECTION INTRAMUSCULAR; INTRAVENOUS EVERY 8 HOURS
Status: DISPENSED | OUTPATIENT
Start: 2020-01-01 | End: 2020-01-01

## 2020-01-01 RX ORDER — FUROSEMIDE 20 MG/1
20 TABLET ORAL DAILY
Qty: 30 TABLET | Refills: 0 | Status: SHIPPED | OUTPATIENT
Start: 2020-01-01

## 2020-01-01 RX ORDER — DIGOXIN 0.25 MG/ML
250 INJECTION INTRAMUSCULAR; INTRAVENOUS ONCE
Status: COMPLETED | OUTPATIENT
Start: 2020-01-01 | End: 2020-01-01

## 2020-01-01 RX ORDER — ACETAMINOPHEN 325 MG/1
650 TABLET ORAL EVERY 6 HOURS PRN
Status: DISCONTINUED | OUTPATIENT
Start: 2020-01-01 | End: 2020-01-01 | Stop reason: DRUGHIGH

## 2020-01-01 RX ORDER — HEPARIN SODIUM 1000 [USP'U]/ML
2000 INJECTION, SOLUTION INTRAVENOUS; SUBCUTANEOUS PRN
Status: DISCONTINUED | OUTPATIENT
Start: 2020-01-01 | End: 2020-01-01

## 2020-01-01 RX ORDER — DOFETILIDE 0.25 MG/1
250 CAPSULE ORAL EVERY 12 HOURS SCHEDULED
Status: DISCONTINUED | OUTPATIENT
Start: 2020-01-01 | End: 2020-01-01

## 2020-01-01 RX ORDER — DOFETILIDE 0.25 MG/1
500 CAPSULE ORAL EVERY 12 HOURS SCHEDULED
Status: DISCONTINUED | OUTPATIENT
Start: 2020-01-01 | End: 2020-01-01

## 2020-01-01 RX ORDER — ONDANSETRON 8 MG/1
8 TABLET, ORALLY DISINTEGRATING ORAL EVERY 8 HOURS PRN
Qty: 30 TABLET | Refills: 3 | Status: SHIPPED | OUTPATIENT
Start: 2020-01-01

## 2020-01-01 RX ORDER — DILTIAZEM HYDROCHLORIDE 240 MG/1
240 CAPSULE, COATED, EXTENDED RELEASE ORAL DAILY
Qty: 30 CAPSULE | Refills: 3 | Status: SHIPPED | OUTPATIENT
Start: 2020-01-01

## 2020-01-01 RX ORDER — FENTANYL CITRATE 50 UG/ML
INJECTION, SOLUTION INTRAMUSCULAR; INTRAVENOUS
Status: COMPLETED | OUTPATIENT
Start: 2020-01-01 | End: 2020-01-01

## 2020-01-01 RX ORDER — LORAZEPAM 0.5 MG/1
0.5 TABLET ORAL EVERY 4 HOURS PRN
Status: DISCONTINUED | OUTPATIENT
Start: 2020-01-01 | End: 2020-01-01

## 2020-01-01 RX ORDER — METOPROLOL SUCCINATE 50 MG/1
100 TABLET, EXTENDED RELEASE ORAL DAILY
Status: DISCONTINUED | OUTPATIENT
Start: 2020-01-01 | End: 2020-01-01 | Stop reason: HOSPADM

## 2020-01-01 RX ORDER — FUROSEMIDE 10 MG/ML
40 INJECTION INTRAMUSCULAR; INTRAVENOUS ONCE
Status: COMPLETED | OUTPATIENT
Start: 2020-01-01 | End: 2020-01-01

## 2020-01-01 RX ORDER — DIPHENHYDRAMINE HYDROCHLORIDE 50 MG/ML
50 INJECTION INTRAMUSCULAR; INTRAVENOUS ONCE
Status: CANCELLED | OUTPATIENT
Start: 2020-01-01

## 2020-01-01 RX ORDER — ENALAPRIL MALEATE 20 MG/1
TABLET ORAL
Qty: 90 TABLET | Refills: 4 | Status: SHIPPED | OUTPATIENT
Start: 2020-01-01

## 2020-01-01 RX ORDER — SODIUM POLYSTYRENE SULFONATE 15 G/60ML
15 SUSPENSION ORAL; RECTAL ONCE
Status: COMPLETED | OUTPATIENT
Start: 2020-01-01 | End: 2020-01-01

## 2020-01-01 RX ORDER — DOFETILIDE 0.25 MG/1
500 CAPSULE ORAL EVERY 12 HOURS SCHEDULED
Status: COMPLETED | OUTPATIENT
Start: 2020-01-01 | End: 2020-01-01

## 2020-01-01 RX ORDER — DILTIAZEM HYDROCHLORIDE 5 MG/ML
10 INJECTION INTRAVENOUS ONCE
Status: DISCONTINUED | OUTPATIENT
Start: 2020-01-01 | End: 2020-01-01

## 2020-01-01 RX ORDER — MAGNESIUM SULFATE 1 G/100ML
1 INJECTION INTRAVENOUS PRN
Status: DISCONTINUED | OUTPATIENT
Start: 2020-01-01 | End: 2020-01-01 | Stop reason: HOSPADM

## 2020-01-01 RX ORDER — METOPROLOL SUCCINATE 100 MG/1
TABLET, EXTENDED RELEASE ORAL
Qty: 90 TABLET | Refills: 3 | Status: SHIPPED | OUTPATIENT
Start: 2020-01-01 | End: 2020-01-01 | Stop reason: SDUPTHER

## 2020-01-01 RX ORDER — PHYTONADIONE 5 MG/1
5 TABLET ORAL ONCE
Status: COMPLETED | OUTPATIENT
Start: 2020-01-01 | End: 2020-01-01

## 2020-01-01 RX ORDER — ENALAPRIL MALEATE 10 MG/1
10 TABLET ORAL DAILY
Status: DISCONTINUED | OUTPATIENT
Start: 2020-01-01 | End: 2020-01-01 | Stop reason: HOSPADM

## 2020-01-01 RX ORDER — METHYLPREDNISOLONE SODIUM SUCCINATE 125 MG/2ML
125 INJECTION, POWDER, LYOPHILIZED, FOR SOLUTION INTRAMUSCULAR; INTRAVENOUS ONCE
Status: CANCELLED | OUTPATIENT
Start: 2020-01-01

## 2020-01-01 RX ORDER — SODIUM CHLORIDE 9 MG/ML
INJECTION, SOLUTION INTRAVENOUS CONTINUOUS
Status: DISCONTINUED | OUTPATIENT
Start: 2020-01-01 | End: 2020-01-01

## 2020-01-01 RX ORDER — ENALAPRIL MALEATE 20 MG/1
TABLET ORAL
Qty: 90 TABLET | Refills: 4 | Status: SHIPPED | OUTPATIENT
Start: 2020-01-01 | End: 2020-01-01 | Stop reason: SDUPTHER

## 2020-01-01 RX ORDER — ONDANSETRON 2 MG/ML
4 INJECTION INTRAMUSCULAR; INTRAVENOUS EVERY 6 HOURS PRN
Status: DISCONTINUED | OUTPATIENT
Start: 2020-01-01 | End: 2020-01-01 | Stop reason: HOSPADM

## 2020-01-01 RX ORDER — LORAZEPAM 2 MG/ML
0.5 INJECTION INTRAMUSCULAR EVERY 4 HOURS
Status: DISCONTINUED | OUTPATIENT
Start: 2020-01-01 | End: 2020-01-01 | Stop reason: HOSPADM

## 2020-01-01 RX ORDER — ACETAMINOPHEN 325 MG/1
650 TABLET ORAL EVERY 4 HOURS PRN
Status: DISCONTINUED | OUTPATIENT
Start: 2020-01-01 | End: 2020-01-01 | Stop reason: SDUPTHER

## 2020-01-01 RX ORDER — ATORVASTATIN CALCIUM 20 MG/1
20 TABLET, FILM COATED ORAL DAILY
Status: DISCONTINUED | OUTPATIENT
Start: 2020-01-01 | End: 2020-01-01 | Stop reason: HOSPADM

## 2020-01-01 RX ORDER — SODIUM CHLORIDE 9 MG/ML
INJECTION, SOLUTION INTRAVENOUS CONTINUOUS
Status: DISCONTINUED | OUTPATIENT
Start: 2020-01-01 | End: 2020-01-01 | Stop reason: HOSPADM

## 2020-01-01 RX ORDER — HEPARIN SODIUM (PORCINE) LOCK FLUSH IV SOLN 100 UNIT/ML 100 UNIT/ML
SOLUTION INTRAVENOUS
Status: COMPLETED | OUTPATIENT
Start: 2020-01-01 | End: 2020-01-01

## 2020-01-01 RX ORDER — DEXAMETHASONE SODIUM PHOSPHATE 4 MG/ML
4 INJECTION, SOLUTION INTRA-ARTICULAR; INTRALESIONAL; INTRAMUSCULAR; INTRAVENOUS; SOFT TISSUE EVERY 8 HOURS
Status: DISCONTINUED | OUTPATIENT
Start: 2020-01-01 | End: 2020-01-01 | Stop reason: HOSPADM

## 2020-01-01 RX ORDER — DEXAMETHASONE SODIUM PHOSPHATE 10 MG/ML
8 INJECTION INTRAMUSCULAR; INTRAVENOUS ONCE
Status: CANCELLED | OUTPATIENT
Start: 2020-01-01

## 2020-01-01 RX ORDER — MAGNESIUM SULFATE 1 G/100ML
1 INJECTION INTRAVENOUS
Status: COMPLETED | OUTPATIENT
Start: 2020-01-01 | End: 2020-01-01

## 2020-01-01 RX ORDER — NICOTINE POLACRILEX 4 MG
15 LOZENGE BUCCAL PRN
Status: DISCONTINUED | OUTPATIENT
Start: 2020-01-01 | End: 2020-01-01 | Stop reason: HOSPADM

## 2020-01-01 RX ORDER — DEXTROSE MONOHYDRATE 50 MG/ML
100 INJECTION, SOLUTION INTRAVENOUS PRN
Status: DISCONTINUED | OUTPATIENT
Start: 2020-01-01 | End: 2020-01-01 | Stop reason: HOSPADM

## 2020-01-01 RX ORDER — METOPROLOL SUCCINATE 100 MG/1
TABLET, EXTENDED RELEASE ORAL
Qty: 90 TABLET | Refills: 3 | Status: SHIPPED | OUTPATIENT
Start: 2020-01-01

## 2020-01-01 RX ORDER — SODIUM CHLORIDE 0.9 % (FLUSH) 0.9 %
10 SYRINGE (ML) INJECTION EVERY 12 HOURS SCHEDULED
Status: DISCONTINUED | OUTPATIENT
Start: 2020-01-01 | End: 2020-01-01 | Stop reason: HOSPADM

## 2020-01-01 RX ORDER — 0.9 % SODIUM CHLORIDE 0.9 %
1000 INTRAVENOUS SOLUTION INTRAVENOUS ONCE
Status: COMPLETED | OUTPATIENT
Start: 2020-01-01 | End: 2020-01-01

## 2020-01-01 RX ORDER — DEXAMETHASONE SODIUM PHOSPHATE 10 MG/ML
8 INJECTION INTRAMUSCULAR; INTRAVENOUS DAILY
Status: DISCONTINUED | OUTPATIENT
Start: 2020-01-01 | End: 2020-01-01 | Stop reason: ALTCHOICE

## 2020-01-01 RX ORDER — DEXTROSE MONOHYDRATE 25 G/50ML
12.5 INJECTION, SOLUTION INTRAVENOUS PRN
Status: DISCONTINUED | OUTPATIENT
Start: 2020-01-01 | End: 2020-01-01 | Stop reason: HOSPADM

## 2020-01-01 RX ORDER — HEPARIN SODIUM 5000 [USP'U]/ML
10000 INJECTION, SOLUTION INTRAVENOUS; SUBCUTANEOUS ONCE
Status: DISCONTINUED | OUTPATIENT
Start: 2020-01-01 | End: 2020-01-01 | Stop reason: ALTCHOICE

## 2020-01-01 RX ORDER — GUAIFENESIN 600 MG/1
600 TABLET, EXTENDED RELEASE ORAL 2 TIMES DAILY
Status: DISCONTINUED | OUTPATIENT
Start: 2020-01-01 | End: 2020-01-01 | Stop reason: HOSPADM

## 2020-01-01 RX ORDER — BENZONATATE 200 MG/1
200 CAPSULE ORAL 3 TIMES DAILY PRN
Qty: 21 CAPSULE | Refills: 0 | Status: SHIPPED | OUTPATIENT
Start: 2020-01-01

## 2020-01-01 RX ORDER — 0.9 % SODIUM CHLORIDE 0.9 %
80 INTRAVENOUS SOLUTION INTRAVENOUS ONCE
Status: COMPLETED | OUTPATIENT
Start: 2020-01-01 | End: 2020-01-01

## 2020-01-01 RX ORDER — HEPARIN SODIUM 10000 [USP'U]/100ML
2100 INJECTION, SOLUTION INTRAVENOUS CONTINUOUS
Status: DISCONTINUED | OUTPATIENT
Start: 2020-01-01 | End: 2020-01-01 | Stop reason: ALTCHOICE

## 2020-01-01 RX ORDER — ENALAPRIL MALEATE 10 MG/1
20 TABLET ORAL DAILY
Status: DISCONTINUED | OUTPATIENT
Start: 2020-01-01 | End: 2020-01-01 | Stop reason: HOSPADM

## 2020-01-01 RX ORDER — HEPARIN SODIUM 1000 [USP'U]/ML
4000 INJECTION, SOLUTION INTRAVENOUS; SUBCUTANEOUS ONCE
Status: COMPLETED | OUTPATIENT
Start: 2020-01-01 | End: 2020-01-01

## 2020-01-01 RX ORDER — LIDOCAINE AND PRILOCAINE 25; 25 MG/G; MG/G
CREAM TOPICAL
Qty: 1 TUBE | Refills: 1 | Status: SHIPPED | OUTPATIENT
Start: 2020-01-01

## 2020-01-01 RX ORDER — DIGOXIN 0.25 MG/ML
INJECTION INTRAMUSCULAR; INTRAVENOUS
Status: COMPLETED
Start: 2020-01-01 | End: 2020-01-01

## 2020-01-01 RX ORDER — IPRATROPIUM BROMIDE AND ALBUTEROL SULFATE 2.5; .5 MG/3ML; MG/3ML
1 SOLUTION RESPIRATORY (INHALATION)
Status: DISCONTINUED | OUTPATIENT
Start: 2020-01-01 | End: 2020-01-01

## 2020-01-01 RX ORDER — LEVOFLOXACIN 750 MG/1
750 TABLET ORAL DAILY
Qty: 5 TABLET | Refills: 0 | Status: SHIPPED | OUTPATIENT
Start: 2020-01-01 | End: 2020-01-01

## 2020-01-01 RX ORDER — ACETAMINOPHEN 325 MG/1
650 TABLET ORAL EVERY 6 HOURS PRN
Status: DISCONTINUED | OUTPATIENT
Start: 2020-01-01 | End: 2020-01-01 | Stop reason: HOSPADM

## 2020-01-01 RX ORDER — PALONOSETRON 0.05 MG/ML
0.25 INJECTION, SOLUTION INTRAVENOUS ONCE
Status: DISCONTINUED | OUTPATIENT
Start: 2020-01-01 | End: 2020-01-01 | Stop reason: CLARIF

## 2020-01-01 RX ORDER — HEPARIN SODIUM 1000 [USP'U]/ML
4000 INJECTION, SOLUTION INTRAVENOUS; SUBCUTANEOUS PRN
Status: DISCONTINUED | OUTPATIENT
Start: 2020-01-01 | End: 2020-01-01

## 2020-01-01 RX ORDER — HEPARIN SODIUM 10000 [USP'U]/100ML
1000 INJECTION, SOLUTION INTRAVENOUS CONTINUOUS
Status: DISCONTINUED | OUTPATIENT
Start: 2020-01-01 | End: 2020-01-01

## 2020-01-01 RX ORDER — AMLODIPINE BESYLATE 10 MG/1
10 TABLET ORAL DAILY
Status: DISCONTINUED | OUTPATIENT
Start: 2020-01-01 | End: 2020-01-01

## 2020-01-01 RX ORDER — BUDESONIDE AND FORMOTEROL FUMARATE DIHYDRATE 160; 4.5 UG/1; UG/1
2 AEROSOL RESPIRATORY (INHALATION) 2 TIMES DAILY
Qty: 1 INHALER | Refills: 3 | Status: SHIPPED | OUTPATIENT
Start: 2020-01-01

## 2020-01-01 RX ORDER — HYDROCHLOROTHIAZIDE 25 MG/1
TABLET ORAL
Qty: 90 TABLET | Refills: 4 | Status: SHIPPED | OUTPATIENT
Start: 2020-01-01 | End: 2020-01-01 | Stop reason: SDUPTHER

## 2020-01-01 RX ORDER — TRAMADOL HYDROCHLORIDE 50 MG/1
50 TABLET ORAL EVERY 4 HOURS PRN
Status: DISCONTINUED | OUTPATIENT
Start: 2020-01-01 | End: 2020-01-01 | Stop reason: HOSPADM

## 2020-01-01 RX ORDER — SODIUM CHLORIDE 450 MG/100ML
INJECTION, SOLUTION INTRAVENOUS CONTINUOUS
Status: DISCONTINUED | OUTPATIENT
Start: 2020-01-01 | End: 2020-01-01

## 2020-01-01 RX ORDER — SODIUM CHLORIDE FOR INHALATION 0.9 %
3 VIAL, NEBULIZER (ML) INHALATION PRN
Status: DISCONTINUED | OUTPATIENT
Start: 2020-01-01 | End: 2020-01-01 | Stop reason: HOSPADM

## 2020-01-01 RX ORDER — MORPHINE SULFATE 2 MG/ML
2 INJECTION, SOLUTION INTRAMUSCULAR; INTRAVENOUS
Status: DISCONTINUED | OUTPATIENT
Start: 2020-01-01 | End: 2020-01-01 | Stop reason: HOSPADM

## 2020-01-01 RX ORDER — ACETAMINOPHEN 650 MG/1
650 SUPPOSITORY RECTAL EVERY 6 HOURS PRN
Status: DISCONTINUED | OUTPATIENT
Start: 2020-01-01 | End: 2020-01-01 | Stop reason: DRUGHIGH

## 2020-01-01 RX ORDER — HEPARIN SODIUM 5000 [USP'U]/ML
10000 INJECTION, SOLUTION INTRAVENOUS; SUBCUTANEOUS PRN
Status: DISCONTINUED | OUTPATIENT
Start: 2020-01-01 | End: 2020-01-01 | Stop reason: ALTCHOICE

## 2020-01-01 RX ORDER — ACETAMINOPHEN 325 MG/1
650 TABLET ORAL EVERY 4 HOURS PRN
Status: DISCONTINUED | OUTPATIENT
Start: 2020-01-01 | End: 2020-01-01 | Stop reason: HOSPADM

## 2020-01-01 RX ORDER — FUROSEMIDE 20 MG/1
20 TABLET ORAL DAILY
Status: DISCONTINUED | OUTPATIENT
Start: 2020-01-01 | End: 2020-01-01 | Stop reason: HOSPADM

## 2020-01-01 RX ORDER — ATORVASTATIN CALCIUM 20 MG/1
TABLET, FILM COATED ORAL
Qty: 90 TABLET | Refills: 3 | Status: SHIPPED | OUTPATIENT
Start: 2020-01-01

## 2020-01-01 RX ORDER — HYDROCHLOROTHIAZIDE 25 MG/1
25 TABLET ORAL DAILY
Status: DISCONTINUED | OUTPATIENT
Start: 2020-01-01 | End: 2020-01-01

## 2020-01-01 RX ORDER — POLYETHYLENE GLYCOL 3350 17 G/17G
17 POWDER, FOR SOLUTION ORAL DAILY PRN
Status: DISCONTINUED | OUTPATIENT
Start: 2020-01-01 | End: 2020-01-01 | Stop reason: HOSPADM

## 2020-01-01 RX ORDER — LEVOFLOXACIN 5 MG/ML
750 INJECTION, SOLUTION INTRAVENOUS EVERY 24 HOURS
Status: DISCONTINUED | OUTPATIENT
Start: 2020-01-01 | End: 2020-01-01 | Stop reason: HOSPADM

## 2020-01-01 RX ORDER — HEPARIN SODIUM 10000 [USP'U]/100ML
12 INJECTION, SOLUTION INTRAVENOUS CONTINUOUS
Status: DISCONTINUED | OUTPATIENT
Start: 2020-01-01 | End: 2020-01-01 | Stop reason: CLARIF

## 2020-01-01 RX ORDER — GLYCOPYRROLATE 1 MG/5 ML
0.1 SYRINGE (ML) INTRAVENOUS ONCE
Status: DISCONTINUED | OUTPATIENT
Start: 2020-01-01 | End: 2020-01-01 | Stop reason: HOSPADM

## 2020-01-01 RX ORDER — MIDAZOLAM HYDROCHLORIDE 1 MG/ML
INJECTION INTRAMUSCULAR; INTRAVENOUS
Status: COMPLETED | OUTPATIENT
Start: 2020-01-01 | End: 2020-01-01

## 2020-01-01 RX ORDER — PANTOPRAZOLE SODIUM 40 MG/1
40 TABLET, DELAYED RELEASE ORAL
Status: DISCONTINUED | OUTPATIENT
Start: 2020-01-01 | End: 2020-01-01 | Stop reason: HOSPADM

## 2020-01-01 RX ORDER — ATORVASTATIN CALCIUM 20 MG/1
20 TABLET, FILM COATED ORAL NIGHTLY
Status: DISCONTINUED | OUTPATIENT
Start: 2020-01-01 | End: 2020-01-01 | Stop reason: HOSPADM

## 2020-01-01 RX ORDER — ALBUTEROL SULFATE 2.5 MG/3ML
10 SOLUTION RESPIRATORY (INHALATION) ONCE
Status: COMPLETED | OUTPATIENT
Start: 2020-01-01 | End: 2020-01-01

## 2020-01-01 RX ORDER — DEXAMETHASONE 4 MG/1
4 TABLET ORAL EVERY 8 HOURS
Status: DISCONTINUED | OUTPATIENT
Start: 2020-01-01 | End: 2020-01-01 | Stop reason: HOSPADM

## 2020-01-01 RX ORDER — POTASSIUM CHLORIDE 20 MEQ/1
40 TABLET, EXTENDED RELEASE ORAL PRN
Status: DISCONTINUED | OUTPATIENT
Start: 2020-01-01 | End: 2020-01-01 | Stop reason: HOSPADM

## 2020-01-01 RX ORDER — ATORVASTATIN CALCIUM 40 MG/1
40 TABLET, FILM COATED ORAL NIGHTLY
Status: DISCONTINUED | OUTPATIENT
Start: 2020-01-01 | End: 2020-01-01 | Stop reason: HOSPADM

## 2020-01-01 RX ORDER — SODIUM CHLORIDE 9 MG/ML
20 INJECTION, SOLUTION INTRAVENOUS ONCE
Status: CANCELLED | OUTPATIENT
Start: 2020-01-01

## 2020-01-01 RX ORDER — DIGOXIN 0.25 MG/ML
125 INJECTION INTRAMUSCULAR; INTRAVENOUS ONCE
Status: DISCONTINUED | OUTPATIENT
Start: 2020-01-01 | End: 2020-01-01

## 2020-01-01 RX ORDER — DEXAMETHASONE SODIUM PHOSPHATE 10 MG/ML
10 INJECTION INTRAMUSCULAR; INTRAVENOUS ONCE
Status: COMPLETED | OUTPATIENT
Start: 2020-01-01 | End: 2020-01-01

## 2020-01-01 RX ORDER — HEPARIN SODIUM 5000 [USP'U]/ML
5000 INJECTION, SOLUTION INTRAVENOUS; SUBCUTANEOUS PRN
Status: DISCONTINUED | OUTPATIENT
Start: 2020-01-01 | End: 2020-01-01 | Stop reason: ALTCHOICE

## 2020-01-01 RX ORDER — ACETAMINOPHEN 650 MG/1
650 SUPPOSITORY RECTAL EVERY 6 HOURS PRN
Status: DISCONTINUED | OUTPATIENT
Start: 2020-01-01 | End: 2020-01-01 | Stop reason: HOSPADM

## 2020-01-01 RX ORDER — DEXAMETHASONE 4 MG/1
4 TABLET ORAL EVERY 8 HOURS
Qty: 30 TABLET | Refills: 0 | Status: SHIPPED | OUTPATIENT
Start: 2020-01-01 | End: 2020-01-01

## 2020-01-01 RX ORDER — ATORVASTATIN CALCIUM 20 MG/1
TABLET, FILM COATED ORAL
Qty: 90 TABLET | Refills: 3 | Status: SHIPPED | OUTPATIENT
Start: 2020-01-01 | End: 2020-01-01 | Stop reason: SDUPTHER

## 2020-01-01 RX ORDER — ENALAPRIL MALEATE 10 MG/1
10 TABLET ORAL DAILY
Status: DISCONTINUED | OUTPATIENT
Start: 2020-01-01 | End: 2020-01-01

## 2020-01-01 RX ORDER — ENALAPRIL MALEATE 10 MG/1
20 TABLET ORAL DAILY
Status: DISCONTINUED | OUTPATIENT
Start: 2020-01-01 | End: 2020-01-01

## 2020-01-01 RX ORDER — ASPIRIN 81 MG/1
81 TABLET, CHEWABLE ORAL DAILY
Qty: 30 TABLET | Refills: 3 | Status: SHIPPED | OUTPATIENT
Start: 2020-01-01

## 2020-01-01 RX ORDER — 0.9 % SODIUM CHLORIDE 0.9 %
500 INTRAVENOUS SOLUTION INTRAVENOUS ONCE
Status: COMPLETED | OUTPATIENT
Start: 2020-01-01 | End: 2020-01-01

## 2020-01-01 RX ORDER — HEPARIN SODIUM 1000 [USP'U]/ML
60 INJECTION, SOLUTION INTRAVENOUS; SUBCUTANEOUS ONCE
Status: DISCONTINUED | OUTPATIENT
Start: 2020-01-01 | End: 2020-01-01 | Stop reason: CLARIF

## 2020-01-01 RX ADMIN — BUDESONIDE AND FORMOTEROL FUMARATE DIHYDRATE 2 PUFF: 160; 4.5 AEROSOL RESPIRATORY (INHALATION) at 20:04

## 2020-01-01 RX ADMIN — ASPIRIN 81 MG 81 MG: 81 TABLET ORAL at 21:00

## 2020-01-01 RX ADMIN — CARBOPLATIN 600 MG: 10 INJECTION INTRAVENOUS at 13:05

## 2020-01-01 RX ADMIN — ATORVASTATIN CALCIUM 40 MG: 40 TABLET, FILM COATED ORAL at 20:40

## 2020-01-01 RX ADMIN — SODIUM CHLORIDE: 4.5 INJECTION, SOLUTION INTRAVENOUS at 09:40

## 2020-01-01 RX ADMIN — DILTIAZEM HYDROCHLORIDE 240 MG: 240 CAPSULE, COATED, EXTENDED RELEASE ORAL at 08:23

## 2020-01-01 RX ADMIN — METOPROLOL SUCCINATE 100 MG: 50 TABLET, FILM COATED, EXTENDED RELEASE ORAL at 08:58

## 2020-01-01 RX ADMIN — ENALAPRIL MALEATE 20 MG: 10 TABLET ORAL at 08:57

## 2020-01-01 RX ADMIN — BUDESONIDE AND FORMOTEROL FUMARATE DIHYDRATE 2 PUFF: 160; 4.5 AEROSOL RESPIRATORY (INHALATION) at 07:42

## 2020-01-01 RX ADMIN — Medication 10 ML: at 08:37

## 2020-01-01 RX ADMIN — LEVOFLOXACIN 750 MG: 5 INJECTION, SOLUTION INTRAVENOUS at 17:54

## 2020-01-01 RX ADMIN — BUDESONIDE AND FORMOTEROL FUMARATE DIHYDRATE 2 PUFF: 160; 4.5 AEROSOL RESPIRATORY (INHALATION) at 20:34

## 2020-01-01 RX ADMIN — HEPARIN SODIUM 4000 UNITS: 1000 INJECTION INTRAVENOUS; SUBCUTANEOUS at 21:00

## 2020-01-01 RX ADMIN — PIPERACILLIN AND TAZOBACTAM 3.38 G: 3; .375 INJECTION, POWDER, LYOPHILIZED, FOR SOLUTION INTRAVENOUS at 08:15

## 2020-01-01 RX ADMIN — DIGOXIN 250 MCG: 0.25 INJECTION INTRAMUSCULAR; INTRAVENOUS at 14:17

## 2020-01-01 RX ADMIN — GUAIFENESIN 600 MG: 600 TABLET, EXTENDED RELEASE ORAL at 08:02

## 2020-01-01 RX ADMIN — DILTIAZEM HYDROCHLORIDE 240 MG: 240 CAPSULE, COATED, EXTENDED RELEASE ORAL at 08:58

## 2020-01-01 RX ADMIN — BUDESONIDE AND FORMOTEROL FUMARATE DIHYDRATE 2 PUFF: 160; 4.5 AEROSOL RESPIRATORY (INHALATION) at 19:57

## 2020-01-01 RX ADMIN — Medication 10 ML: at 09:19

## 2020-01-01 RX ADMIN — DILTIAZEM HYDROCHLORIDE 240 MG: 240 CAPSULE, COATED, EXTENDED RELEASE ORAL at 08:16

## 2020-01-01 RX ADMIN — PANTOPRAZOLE SODIUM 40 MG: 40 TABLET, DELAYED RELEASE ORAL at 08:37

## 2020-01-01 RX ADMIN — IPRATROPIUM BROMIDE AND ALBUTEROL SULFATE 1 AMPULE: .5; 3 SOLUTION RESPIRATORY (INHALATION) at 07:34

## 2020-01-01 RX ADMIN — SODIUM CHLORIDE 80 ML: 9 INJECTION, SOLUTION INTRAVENOUS at 16:16

## 2020-01-01 RX ADMIN — DILTIAZEM HYDROCHLORIDE 240 MG: 240 CAPSULE, COATED, EXTENDED RELEASE ORAL at 08:21

## 2020-01-01 RX ADMIN — ENALAPRIL MALEATE 20 MG: 10 TABLET ORAL at 21:00

## 2020-01-01 RX ADMIN — FUROSEMIDE 20 MG: 20 TABLET ORAL at 09:02

## 2020-01-01 RX ADMIN — SODIUM POLYSTYRENE SULFONATE 15 G: 15 SUSPENSION ORAL; RECTAL at 06:24

## 2020-01-01 RX ADMIN — PIPERACILLIN AND TAZOBACTAM 3.38 G: 3; .375 INJECTION, POWDER, LYOPHILIZED, FOR SOLUTION INTRAVENOUS at 23:34

## 2020-01-01 RX ADMIN — IPRATROPIUM BROMIDE AND ALBUTEROL SULFATE 1 AMPULE: .5; 3 SOLUTION RESPIRATORY (INHALATION) at 06:09

## 2020-01-01 RX ADMIN — GUAIFENESIN 600 MG: 600 TABLET, EXTENDED RELEASE ORAL at 19:57

## 2020-01-01 RX ADMIN — METOPROLOL SUCCINATE 100 MG: 50 TABLET, EXTENDED RELEASE ORAL at 08:23

## 2020-01-01 RX ADMIN — BUDESONIDE AND FORMOTEROL FUMARATE DIHYDRATE 2 PUFF: 160; 4.5 AEROSOL RESPIRATORY (INHALATION) at 10:52

## 2020-01-01 RX ADMIN — DOFETILIDE 500 MCG: 0.25 CAPSULE ORAL at 20:45

## 2020-01-01 RX ADMIN — SODIUM CHLORIDE, PRESERVATIVE FREE 10 ML: 5 INJECTION INTRAVENOUS at 21:00

## 2020-01-01 RX ADMIN — DEXAMETHASONE SODIUM PHOSPHATE 8 MG: 10 INJECTION INTRAMUSCULAR; INTRAVENOUS at 11:04

## 2020-01-01 RX ADMIN — ETOPOSIDE 200 MG: 20 INJECTION, SOLUTION, CONCENTRATE INTRAVENOUS at 12:03

## 2020-01-01 RX ADMIN — BUDESONIDE AND FORMOTEROL FUMARATE DIHYDRATE 2 PUFF: 160; 4.5 AEROSOL RESPIRATORY (INHALATION) at 20:52

## 2020-01-01 RX ADMIN — DRONABINOL 2.5 MG: 2.5 CAPSULE ORAL at 08:54

## 2020-01-01 RX ADMIN — DEXAMETHASONE SODIUM PHOSPHATE 10 MG: 10 INJECTION INTRAMUSCULAR; INTRAVENOUS at 12:34

## 2020-01-01 RX ADMIN — METOPROLOL SUCCINATE 100 MG: 50 TABLET, EXTENDED RELEASE ORAL at 08:16

## 2020-01-01 RX ADMIN — METOPROLOL SUCCINATE 100 MG: 50 TABLET, EXTENDED RELEASE ORAL at 08:01

## 2020-01-01 RX ADMIN — DEXAMETHASONE 4 MG: 4 TABLET ORAL at 00:37

## 2020-01-01 RX ADMIN — DOFETILIDE 250 MCG: 0.25 CAPSULE ORAL at 09:51

## 2020-01-01 RX ADMIN — GUAIFENESIN 600 MG: 600 TABLET, EXTENDED RELEASE ORAL at 08:21

## 2020-01-01 RX ADMIN — PANTOPRAZOLE SODIUM 40 MG: 40 TABLET, DELAYED RELEASE ORAL at 06:44

## 2020-01-01 RX ADMIN — IPRATROPIUM BROMIDE AND ALBUTEROL SULFATE 1 AMPULE: .5; 3 SOLUTION RESPIRATORY (INHALATION) at 15:20

## 2020-01-01 RX ADMIN — MAGNESIUM SULFATE HEPTAHYDRATE 1 G: 1 INJECTION, SOLUTION INTRAVENOUS at 11:19

## 2020-01-01 RX ADMIN — ATORVASTATIN CALCIUM 20 MG: 20 TABLET, FILM COATED ORAL at 21:45

## 2020-01-01 RX ADMIN — ASPIRIN 81 MG 81 MG: 81 TABLET ORAL at 08:01

## 2020-01-01 RX ADMIN — Medication 10 ML: at 16:16

## 2020-01-01 RX ADMIN — DRONABINOL 2.5 MG: 2.5 CAPSULE ORAL at 20:45

## 2020-01-01 RX ADMIN — ASPIRIN 81 MG 81 MG: 81 TABLET ORAL at 08:43

## 2020-01-01 RX ADMIN — METOPROLOL SUCCINATE 100 MG: 50 TABLET, FILM COATED, EXTENDED RELEASE ORAL at 08:36

## 2020-01-01 RX ADMIN — GUAIFENESIN 600 MG: 600 TABLET, EXTENDED RELEASE ORAL at 09:14

## 2020-01-01 RX ADMIN — ACETAMINOPHEN 650 MG: 325 TABLET ORAL at 01:37

## 2020-01-01 RX ADMIN — DILTIAZEM HYDROCHLORIDE 240 MG: 240 CAPSULE, COATED, EXTENDED RELEASE ORAL at 08:54

## 2020-01-01 RX ADMIN — MORPHINE SULFATE 2 MG: 2 INJECTION, SOLUTION INTRAMUSCULAR; INTRAVENOUS at 03:04

## 2020-01-01 RX ADMIN — DEXTROSE MONOHYDRATE 25 G: 25 INJECTION, SOLUTION INTRAVENOUS at 09:16

## 2020-01-01 RX ADMIN — PANTOPRAZOLE SODIUM 40 MG: 40 TABLET, DELAYED RELEASE ORAL at 06:10

## 2020-01-01 RX ADMIN — ACETAMINOPHEN 650 MG: 325 TABLET ORAL at 13:56

## 2020-01-01 RX ADMIN — APIXABAN 10 MG: 5 TABLET, FILM COATED ORAL at 08:23

## 2020-01-01 RX ADMIN — IPRATROPIUM BROMIDE AND ALBUTEROL SULFATE 1 AMPULE: .5; 3 SOLUTION RESPIRATORY (INHALATION) at 19:53

## 2020-01-01 RX ADMIN — ENOXAPARIN SODIUM 135 MG: 150 INJECTION SUBCUTANEOUS at 09:07

## 2020-01-01 RX ADMIN — GUAIFENESIN 600 MG: 600 TABLET, EXTENDED RELEASE ORAL at 08:35

## 2020-01-01 RX ADMIN — ASPIRIN 81 MG 81 MG: 81 TABLET ORAL at 15:15

## 2020-01-01 RX ADMIN — DOFETILIDE 500 MCG: 0.25 CAPSULE ORAL at 08:21

## 2020-01-01 RX ADMIN — DEXAMETHASONE SODIUM PHOSPHATE 8 MG: 10 INJECTION INTRAMUSCULAR; INTRAVENOUS at 11:16

## 2020-01-01 RX ADMIN — DOFETILIDE 500 MCG: 0.25 CAPSULE ORAL at 09:02

## 2020-01-01 RX ADMIN — ASPIRIN 81 MG 81 MG: 81 TABLET ORAL at 08:16

## 2020-01-01 RX ADMIN — BUDESONIDE AND FORMOTEROL FUMARATE DIHYDRATE 2 PUFF: 160; 4.5 AEROSOL RESPIRATORY (INHALATION) at 07:44

## 2020-01-01 RX ADMIN — BUDESONIDE AND FORMOTEROL FUMARATE DIHYDRATE 2 PUFF: 160; 4.5 AEROSOL RESPIRATORY (INHALATION) at 09:09

## 2020-01-01 RX ADMIN — PANTOPRAZOLE SODIUM 40 MG: 40 TABLET, DELAYED RELEASE ORAL at 08:02

## 2020-01-01 RX ADMIN — PANTOPRAZOLE SODIUM 40 MG: 40 TABLET, DELAYED RELEASE ORAL at 06:23

## 2020-01-01 RX ADMIN — ATORVASTATIN CALCIUM 20 MG: 20 TABLET, FILM COATED ORAL at 08:01

## 2020-01-01 RX ADMIN — DOFETILIDE 500 MCG: 0.25 CAPSULE ORAL at 20:16

## 2020-01-01 RX ADMIN — DILTIAZEM HYDROCHLORIDE 10 MG: 5 INJECTION INTRAVENOUS at 18:35

## 2020-01-01 RX ADMIN — DEXAMETHASONE 4 MG: 4 TABLET ORAL at 00:51

## 2020-01-01 RX ADMIN — BUDESONIDE AND FORMOTEROL FUMARATE DIHYDRATE 2 PUFF: 160; 4.5 AEROSOL RESPIRATORY (INHALATION) at 07:34

## 2020-01-01 RX ADMIN — ATORVASTATIN CALCIUM 40 MG: 40 TABLET, FILM COATED ORAL at 20:57

## 2020-01-01 RX ADMIN — DEXAMETHASONE 4 MG: 4 TABLET ORAL at 00:34

## 2020-01-01 RX ADMIN — ATORVASTATIN CALCIUM 20 MG: 20 TABLET, FILM COATED ORAL at 09:03

## 2020-01-01 RX ADMIN — METOPROLOL SUCCINATE 100 MG: 50 TABLET, EXTENDED RELEASE ORAL at 09:02

## 2020-01-01 RX ADMIN — SODIUM CHLORIDE: 9 INJECTION, SOLUTION INTRAVENOUS at 22:31

## 2020-01-01 RX ADMIN — PIPERACILLIN AND TAZOBACTAM 3.38 G: 3; .375 INJECTION, POWDER, LYOPHILIZED, FOR SOLUTION INTRAVENOUS at 09:00

## 2020-01-01 RX ADMIN — ONDANSETRON 8 MG: 2 INJECTION INTRAMUSCULAR; INTRAVENOUS at 18:35

## 2020-01-01 RX ADMIN — ENALAPRIL MALEATE 20 MG: 10 TABLET ORAL at 10:08

## 2020-01-01 RX ADMIN — DEXAMETHASONE 4 MG: 4 TABLET ORAL at 16:09

## 2020-01-01 RX ADMIN — GUAIFENESIN 600 MG: 600 TABLET, EXTENDED RELEASE ORAL at 08:42

## 2020-01-01 RX ADMIN — ASPIRIN 81 MG 81 MG: 81 TABLET ORAL at 08:02

## 2020-01-01 RX ADMIN — DEXAMETHASONE SODIUM PHOSPHATE 4 MG: 4 INJECTION, SOLUTION INTRAMUSCULAR; INTRAVENOUS at 17:54

## 2020-01-01 RX ADMIN — SODIUM CHLORIDE 500 ML: 9 INJECTION, SOLUTION INTRAVENOUS at 16:40

## 2020-01-01 RX ADMIN — SODIUM CHLORIDE: 9 INJECTION, SOLUTION INTRAVENOUS at 17:20

## 2020-01-01 RX ADMIN — SODIUM CHLORIDE: 9 INJECTION, SOLUTION INTRAVENOUS at 09:18

## 2020-01-01 RX ADMIN — ONDANSETRON 8 MG: 2 INJECTION INTRAMUSCULAR; INTRAVENOUS at 11:16

## 2020-01-01 RX ADMIN — DILTIAZEM HYDROCHLORIDE 240 MG: 240 CAPSULE, COATED, EXTENDED RELEASE ORAL at 08:36

## 2020-01-01 RX ADMIN — METOPROLOL SUCCINATE 100 MG: 50 TABLET, EXTENDED RELEASE ORAL at 08:34

## 2020-01-01 RX ADMIN — ENALAPRIL MALEATE 20 MG: 10 TABLET ORAL at 08:36

## 2020-01-01 RX ADMIN — IOPAMIDOL 75 ML: 755 INJECTION, SOLUTION INTRAVENOUS at 16:16

## 2020-01-01 RX ADMIN — PANTOPRAZOLE SODIUM 40 MG: 40 TABLET, DELAYED RELEASE ORAL at 06:51

## 2020-01-01 RX ADMIN — BUDESONIDE AND FORMOTEROL FUMARATE DIHYDRATE 2 PUFF: 160; 4.5 AEROSOL RESPIRATORY (INHALATION) at 19:45

## 2020-01-01 RX ADMIN — DOFETILIDE 500 MCG: 0.25 CAPSULE ORAL at 20:59

## 2020-01-01 RX ADMIN — Medication 10 ML: at 19:28

## 2020-01-01 RX ADMIN — DEXAMETHASONE 4 MG: 4 TABLET ORAL at 16:43

## 2020-01-01 RX ADMIN — BUDESONIDE AND FORMOTEROL FUMARATE DIHYDRATE 2 PUFF: 160; 4.5 AEROSOL RESPIRATORY (INHALATION) at 19:40

## 2020-01-01 RX ADMIN — GUAIFENESIN 600 MG: 600 TABLET, EXTENDED RELEASE ORAL at 20:45

## 2020-01-01 RX ADMIN — AMLODIPINE BESYLATE 10 MG: 10 TABLET ORAL at 21:00

## 2020-01-01 RX ADMIN — ASPIRIN 81 MG 81 MG: 81 TABLET ORAL at 08:21

## 2020-01-01 RX ADMIN — ENALAPRIL MALEATE 20 MG: 10 TABLET ORAL at 09:03

## 2020-01-01 RX ADMIN — BUDESONIDE AND FORMOTEROL FUMARATE DIHYDRATE 2 PUFF: 160; 4.5 AEROSOL RESPIRATORY (INHALATION) at 09:57

## 2020-01-01 RX ADMIN — IPRATROPIUM BROMIDE AND ALBUTEROL SULFATE 1 AMPULE: .5; 3 SOLUTION RESPIRATORY (INHALATION) at 11:45

## 2020-01-01 RX ADMIN — ASPIRIN 81 MG 81 MG: 81 TABLET ORAL at 08:48

## 2020-01-01 RX ADMIN — FUROSEMIDE 40 MG: 40 TABLET ORAL at 09:07

## 2020-01-01 RX ADMIN — DEXAMETHASONE 4 MG: 4 TABLET ORAL at 16:23

## 2020-01-01 RX ADMIN — TRAMADOL HYDROCHLORIDE 50 MG: 50 TABLET, FILM COATED ORAL at 23:47

## 2020-01-01 RX ADMIN — ACETAMINOPHEN 650 MG: 325 TABLET ORAL at 00:56

## 2020-01-01 RX ADMIN — DEXTROSE MONOHYDRATE 1 G: 5 INJECTION INTRAVENOUS at 13:20

## 2020-01-01 RX ADMIN — IPRATROPIUM BROMIDE AND ALBUTEROL SULFATE 1 AMPULE: .5; 3 SOLUTION RESPIRATORY (INHALATION) at 20:11

## 2020-01-01 RX ADMIN — SODIUM CHLORIDE: 9 INJECTION, SOLUTION INTRAVENOUS at 14:15

## 2020-01-01 RX ADMIN — METOPROLOL SUCCINATE 100 MG: 50 TABLET, FILM COATED, EXTENDED RELEASE ORAL at 09:06

## 2020-01-01 RX ADMIN — PIPERACILLIN AND TAZOBACTAM 3.38 G: 3; .375 INJECTION, POWDER, LYOPHILIZED, FOR SOLUTION INTRAVENOUS at 00:34

## 2020-01-01 RX ADMIN — BUDESONIDE AND FORMOTEROL FUMARATE DIHYDRATE 2 PUFF: 160; 4.5 AEROSOL RESPIRATORY (INHALATION) at 19:36

## 2020-01-01 RX ADMIN — ENALAPRIL MALEATE 10 MG: 10 TABLET ORAL at 08:23

## 2020-01-01 RX ADMIN — DILTIAZEM HYDROCHLORIDE 240 MG: 240 CAPSULE, COATED, EXTENDED RELEASE ORAL at 08:35

## 2020-01-01 RX ADMIN — DEXAMETHASONE 4 MG: 4 TABLET ORAL at 08:35

## 2020-01-01 RX ADMIN — ASPIRIN 81 MG 81 MG: 81 TABLET ORAL at 09:02

## 2020-01-01 RX ADMIN — DEXAMETHASONE 4 MG: 4 TABLET ORAL at 16:30

## 2020-01-01 RX ADMIN — Medication 10 ML: at 10:24

## 2020-01-01 RX ADMIN — PIPERACILLIN AND TAZOBACTAM 3.38 G: 3; .375 INJECTION, POWDER, LYOPHILIZED, FOR SOLUTION INTRAVENOUS at 16:30

## 2020-01-01 RX ADMIN — BUDESONIDE AND FORMOTEROL FUMARATE DIHYDRATE 2 PUFF: 160; 4.5 AEROSOL RESPIRATORY (INHALATION) at 09:50

## 2020-01-01 RX ADMIN — IPRATROPIUM BROMIDE AND ALBUTEROL SULFATE 1 AMPULE: .5; 3 SOLUTION RESPIRATORY (INHALATION) at 14:57

## 2020-01-01 RX ADMIN — METOPROLOL SUCCINATE 100 MG: 50 TABLET, EXTENDED RELEASE ORAL at 09:14

## 2020-01-01 RX ADMIN — ASPIRIN 81 MG 81 MG: 81 TABLET ORAL at 09:14

## 2020-01-01 RX ADMIN — FLUDEOXYGLUCOSE F 18 12.23 MILLICURIE: 200 INJECTION, SOLUTION INTRAVENOUS at 10:24

## 2020-01-01 RX ADMIN — IPRATROPIUM BROMIDE AND ALBUTEROL SULFATE 1 AMPULE: .5; 3 SOLUTION RESPIRATORY (INHALATION) at 11:04

## 2020-01-01 RX ADMIN — LEVOFLOXACIN 500 MG: 500 TABLET, FILM COATED ORAL at 16:23

## 2020-01-01 RX ADMIN — ACETAMINOPHEN 650 MG: 325 TABLET ORAL at 11:24

## 2020-01-01 RX ADMIN — ENOXAPARIN SODIUM 130 MG: 150 INJECTION SUBCUTANEOUS at 16:32

## 2020-01-01 RX ADMIN — BUDESONIDE AND FORMOTEROL FUMARATE DIHYDRATE 2 PUFF: 160; 4.5 AEROSOL RESPIRATORY (INHALATION) at 06:09

## 2020-01-01 RX ADMIN — LEVOFLOXACIN 500 MG: 500 TABLET, FILM COATED ORAL at 08:34

## 2020-01-01 RX ADMIN — Medication 10 ML: at 08:01

## 2020-01-01 RX ADMIN — ENALAPRIL MALEATE 10 MG: 10 TABLET ORAL at 15:15

## 2020-01-01 RX ADMIN — DEXAMETHASONE 4 MG: 4 TABLET ORAL at 01:04

## 2020-01-01 RX ADMIN — FUROSEMIDE 20 MG: 20 TABLET ORAL at 08:01

## 2020-01-01 RX ADMIN — DEXAMETHASONE 4 MG: 4 TABLET ORAL at 16:47

## 2020-01-01 RX ADMIN — Medication 10 ML: at 22:17

## 2020-01-01 RX ADMIN — DILTIAZEM HYDROCHLORIDE 240 MG: 240 CAPSULE, COATED, EXTENDED RELEASE ORAL at 08:40

## 2020-01-01 RX ADMIN — ATORVASTATIN CALCIUM 40 MG: 40 TABLET, FILM COATED ORAL at 20:45

## 2020-01-01 RX ADMIN — ACETAMINOPHEN 650 MG: 325 TABLET ORAL at 18:35

## 2020-01-01 RX ADMIN — TRAMADOL HYDROCHLORIDE 50 MG: 50 TABLET, FILM COATED ORAL at 22:08

## 2020-01-01 RX ADMIN — PHYTONADIONE 5 MG: 5 TABLET ORAL at 16:30

## 2020-01-01 RX ADMIN — DOFETILIDE 500 MCG: 0.25 CAPSULE ORAL at 08:34

## 2020-01-01 RX ADMIN — ATORVASTATIN CALCIUM 40 MG: 40 TABLET, FILM COATED ORAL at 20:16

## 2020-01-01 RX ADMIN — DEXAMETHASONE 4 MG: 4 TABLET ORAL at 17:33

## 2020-01-01 RX ADMIN — SODIUM CHLORIDE, PRESERVATIVE FREE 10 ML: 5 INJECTION INTRAVENOUS at 09:07

## 2020-01-01 RX ADMIN — DOFETILIDE 500 MCG: 0.25 CAPSULE ORAL at 20:32

## 2020-01-01 RX ADMIN — ATORVASTATIN CALCIUM 40 MG: 40 TABLET, FILM COATED ORAL at 19:24

## 2020-01-01 RX ADMIN — LEVOFLOXACIN 750 MG: 5 INJECTION, SOLUTION INTRAVENOUS at 15:18

## 2020-01-01 RX ADMIN — DOFETILIDE 500 MCG: 0.25 CAPSULE ORAL at 11:18

## 2020-01-01 RX ADMIN — INSULIN HUMAN 10 UNITS: 100 INJECTION, SOLUTION PARENTERAL at 11:36

## 2020-01-01 RX ADMIN — Medication 10 ML: at 09:04

## 2020-01-01 RX ADMIN — PIPERACILLIN AND TAZOBACTAM 3.38 G: 3; .375 INJECTION, POWDER, LYOPHILIZED, FOR SOLUTION INTRAVENOUS at 00:39

## 2020-01-01 RX ADMIN — DILTIAZEM HYDROCHLORIDE 240 MG: 240 CAPSULE, COATED, EXTENDED RELEASE ORAL at 08:02

## 2020-01-01 RX ADMIN — DILTIAZEM HYDROCHLORIDE 240 MG: 240 CAPSULE, COATED, EXTENDED RELEASE ORAL at 09:02

## 2020-01-01 RX ADMIN — ACETAMINOPHEN 650 MG: 325 TABLET ORAL at 22:56

## 2020-01-01 RX ADMIN — PANTOPRAZOLE SODIUM 40 MG: 40 TABLET, DELAYED RELEASE ORAL at 06:46

## 2020-01-01 RX ADMIN — IPRATROPIUM BROMIDE AND ALBUTEROL SULFATE 1 AMPULE: .5; 3 SOLUTION RESPIRATORY (INHALATION) at 07:27

## 2020-01-01 RX ADMIN — ATORVASTATIN CALCIUM 20 MG: 20 TABLET, FILM COATED ORAL at 08:23

## 2020-01-01 RX ADMIN — DILTIAZEM HYDROCHLORIDE 5 MG/HR: 5 INJECTION INTRAVENOUS at 21:00

## 2020-01-01 RX ADMIN — ETOPOSIDE 200 MG: 20 INJECTION, SOLUTION, CONCENTRATE INTRAVENOUS at 14:39

## 2020-01-01 RX ADMIN — HYDROCHLOROTHIAZIDE 25 MG: 25 TABLET ORAL at 08:36

## 2020-01-01 RX ADMIN — PANTOPRAZOLE SODIUM 40 MG: 40 TABLET, DELAYED RELEASE ORAL at 06:15

## 2020-01-01 RX ADMIN — DRONABINOL 2.5 MG: 2.5 CAPSULE ORAL at 20:40

## 2020-01-01 RX ADMIN — DILTIAZEM HYDROCHLORIDE 10 MG: 5 INJECTION INTRAVENOUS at 17:47

## 2020-01-01 RX ADMIN — DEXAMETHASONE 4 MG: 4 TABLET ORAL at 00:16

## 2020-01-01 RX ADMIN — PIPERACILLIN AND TAZOBACTAM 3.38 G: 3; .375 INJECTION, POWDER, LYOPHILIZED, FOR SOLUTION INTRAVENOUS at 08:46

## 2020-01-01 RX ADMIN — ENOXAPARIN SODIUM 135 MG: 150 INJECTION SUBCUTANEOUS at 21:27

## 2020-01-01 RX ADMIN — SODIUM CHLORIDE: 9 INJECTION, SOLUTION INTRAVENOUS at 17:26

## 2020-01-01 RX ADMIN — ATORVASTATIN CALCIUM 20 MG: 20 TABLET, FILM COATED ORAL at 21:28

## 2020-01-01 RX ADMIN — GUAIFENESIN 600 MG: 600 TABLET, EXTENDED RELEASE ORAL at 08:48

## 2020-01-01 RX ADMIN — HEPARIN SODIUM 11.6 UNITS/KG/HR: 10000 INJECTION, SOLUTION INTRAVENOUS at 16:20

## 2020-01-01 RX ADMIN — IPRATROPIUM BROMIDE AND ALBUTEROL SULFATE 1 AMPULE: .5; 3 SOLUTION RESPIRATORY (INHALATION) at 15:36

## 2020-01-01 RX ADMIN — DILTIAZEM HYDROCHLORIDE 240 MG: 240 CAPSULE, COATED, EXTENDED RELEASE ORAL at 09:14

## 2020-01-01 RX ADMIN — DRONABINOL 2.5 MG: 2.5 CAPSULE ORAL at 08:42

## 2020-01-01 RX ADMIN — ENOXAPARIN SODIUM 135 MG: 150 INJECTION SUBCUTANEOUS at 10:30

## 2020-01-01 RX ADMIN — HEPARIN SODIUM 13.93 UNITS/KG/HR: 10000 INJECTION, SOLUTION INTRAVENOUS at 01:44

## 2020-01-01 RX ADMIN — DEXAMETHASONE 4 MG: 4 TABLET ORAL at 09:24

## 2020-01-01 RX ADMIN — ENALAPRIL MALEATE 20 MG: 10 TABLET ORAL at 09:06

## 2020-01-01 RX ADMIN — GUAIFENESIN 600 MG: 600 TABLET, EXTENDED RELEASE ORAL at 20:40

## 2020-01-01 RX ADMIN — HYDROCHLOROTHIAZIDE 25 MG: 25 TABLET ORAL at 21:00

## 2020-01-01 RX ADMIN — METOPROLOL SUCCINATE 100 MG: 50 TABLET, EXTENDED RELEASE ORAL at 08:48

## 2020-01-01 RX ADMIN — GUAIFENESIN 600 MG: 600 TABLET, EXTENDED RELEASE ORAL at 08:15

## 2020-01-01 RX ADMIN — ASPIRIN 81 MG 81 MG: 81 TABLET ORAL at 08:36

## 2020-01-01 RX ADMIN — DEXAMETHASONE 4 MG: 4 TABLET ORAL at 16:14

## 2020-01-01 RX ADMIN — BUDESONIDE AND FORMOTEROL FUMARATE DIHYDRATE 2 PUFF: 160; 4.5 AEROSOL RESPIRATORY (INHALATION) at 20:10

## 2020-01-01 RX ADMIN — BUDESONIDE AND FORMOTEROL FUMARATE DIHYDRATE 2 PUFF: 160; 4.5 AEROSOL RESPIRATORY (INHALATION) at 09:25

## 2020-01-01 RX ADMIN — APIXABAN 10 MG: 5 TABLET, FILM COATED ORAL at 09:38

## 2020-01-01 RX ADMIN — DEXTROSE MONOHYDRATE 2 MCG/MIN: 50 INJECTION, SOLUTION INTRAVENOUS at 03:00

## 2020-01-01 RX ADMIN — ASPIRIN 81 MG 81 MG: 81 TABLET ORAL at 09:24

## 2020-01-01 RX ADMIN — PIPERACILLIN AND TAZOBACTAM 3.38 G: 3; .375 INJECTION, POWDER, LYOPHILIZED, FOR SOLUTION INTRAVENOUS at 16:14

## 2020-01-01 RX ADMIN — BUDESONIDE AND FORMOTEROL FUMARATE DIHYDRATE 2 PUFF: 160; 4.5 AEROSOL RESPIRATORY (INHALATION) at 07:27

## 2020-01-01 RX ADMIN — HEPARIN SODIUM 11.6 UNITS/KG/HR: 10000 INJECTION, SOLUTION INTRAVENOUS at 03:48

## 2020-01-01 RX ADMIN — IPRATROPIUM BROMIDE AND ALBUTEROL SULFATE 1 AMPULE: .5; 3 SOLUTION RESPIRATORY (INHALATION) at 07:53

## 2020-01-01 RX ADMIN — PANTOPRAZOLE SODIUM 40 MG: 40 TABLET, DELAYED RELEASE ORAL at 06:27

## 2020-01-01 RX ADMIN — DEXAMETHASONE 4 MG: 4 TABLET ORAL at 08:34

## 2020-01-01 RX ADMIN — ETOPOSIDE 200 MG: 20 INJECTION, SOLUTION, CONCENTRATE INTRAVENOUS at 11:35

## 2020-01-01 RX ADMIN — APIXABAN 10 MG: 5 TABLET, FILM COATED ORAL at 16:23

## 2020-01-01 RX ADMIN — BUDESONIDE AND FORMOTEROL FUMARATE DIHYDRATE 2 PUFF: 160; 4.5 AEROSOL RESPIRATORY (INHALATION) at 08:00

## 2020-01-01 RX ADMIN — DEXAMETHASONE 4 MG: 4 TABLET ORAL at 08:42

## 2020-01-01 RX ADMIN — IOPAMIDOL 75 ML: 755 INJECTION, SOLUTION INTRAVENOUS at 10:22

## 2020-01-01 RX ADMIN — DRONABINOL 2.5 MG: 2.5 CAPSULE ORAL at 08:21

## 2020-01-01 RX ADMIN — IPRATROPIUM BROMIDE AND ALBUTEROL SULFATE 1 AMPULE: .5; 3 SOLUTION RESPIRATORY (INHALATION) at 19:35

## 2020-01-01 RX ADMIN — SODIUM POLYSTYRENE SULFONATE 15 G: 15 SUSPENSION ORAL; RECTAL at 09:13

## 2020-01-01 RX ADMIN — METOPROLOL SUCCINATE 100 MG: 50 TABLET, EXTENDED RELEASE ORAL at 08:42

## 2020-01-01 RX ADMIN — SODIUM CHLORIDE: 9 INJECTION, SOLUTION INTRAVENOUS at 12:53

## 2020-01-01 RX ADMIN — ASPIRIN 81 MG 81 MG: 81 TABLET ORAL at 08:58

## 2020-01-01 RX ADMIN — ASPIRIN 81 MG 81 MG: 81 TABLET ORAL at 08:41

## 2020-01-01 RX ADMIN — FUROSEMIDE 40 MG: 10 INJECTION, SOLUTION INTRAMUSCULAR; INTRAVENOUS at 16:09

## 2020-01-01 RX ADMIN — ASPIRIN 81 MG 81 MG: 81 TABLET ORAL at 08:54

## 2020-01-01 RX ADMIN — Medication 10 ML: at 08:03

## 2020-01-01 RX ADMIN — Medication 10 ML: at 19:37

## 2020-01-01 RX ADMIN — SODIUM CHLORIDE 1000 ML: 9 INJECTION, SOLUTION INTRAVENOUS at 09:36

## 2020-01-01 RX ADMIN — APIXABAN 10 MG: 5 TABLET, FILM COATED ORAL at 09:03

## 2020-01-01 RX ADMIN — DILTIAZEM HYDROCHLORIDE 15 MG/HR: 5 INJECTION INTRAVENOUS at 11:19

## 2020-01-01 RX ADMIN — DILTIAZEM HYDROCHLORIDE 240 MG: 240 CAPSULE, COATED, EXTENDED RELEASE ORAL at 15:15

## 2020-01-01 RX ADMIN — SODIUM CHLORIDE, PRESERVATIVE FREE 10 ML: 5 INJECTION INTRAVENOUS at 21:27

## 2020-01-01 RX ADMIN — METOPROLOL SUCCINATE 100 MG: 50 TABLET, FILM COATED, EXTENDED RELEASE ORAL at 21:00

## 2020-01-01 RX ADMIN — IPRATROPIUM BROMIDE AND ALBUTEROL SULFATE 1 AMPULE: .5; 3 SOLUTION RESPIRATORY (INHALATION) at 19:49

## 2020-01-01 RX ADMIN — IPRATROPIUM BROMIDE AND ALBUTEROL SULFATE 1 AMPULE: .5; 3 SOLUTION RESPIRATORY (INHALATION) at 11:13

## 2020-01-01 RX ADMIN — ONDANSETRON 8 MG: 2 INJECTION INTRAMUSCULAR; INTRAVENOUS at 12:33

## 2020-01-01 RX ADMIN — IPRATROPIUM BROMIDE AND ALBUTEROL SULFATE 1 AMPULE: .5; 3 SOLUTION RESPIRATORY (INHALATION) at 07:40

## 2020-01-01 RX ADMIN — GUAIFENESIN 600 MG: 600 TABLET, EXTENDED RELEASE ORAL at 19:37

## 2020-01-01 RX ADMIN — SODIUM POLYSTYRENE SULFONATE 15 G: 15 SUSPENSION ORAL; RECTAL at 16:48

## 2020-01-01 RX ADMIN — GADOTERIDOL 20 ML: 279.3 INJECTION, SOLUTION INTRAVENOUS at 11:03

## 2020-01-01 RX ADMIN — ENOXAPARIN SODIUM 135 MG: 150 INJECTION SUBCUTANEOUS at 08:01

## 2020-01-01 RX ADMIN — ATORVASTATIN CALCIUM 40 MG: 40 TABLET, FILM COATED ORAL at 21:26

## 2020-01-01 RX ADMIN — FUROSEMIDE 20 MG: 20 TABLET ORAL at 08:23

## 2020-01-01 RX ADMIN — DEXAMETHASONE 4 MG: 4 TABLET ORAL at 16:32

## 2020-01-01 RX ADMIN — DEXAMETHASONE SODIUM PHOSPHATE 4 MG: 4 INJECTION, SOLUTION INTRAMUSCULAR; INTRAVENOUS at 08:23

## 2020-01-01 RX ADMIN — Medication 10 ML: at 19:57

## 2020-01-01 RX ADMIN — DILTIAZEM HYDROCHLORIDE 240 MG: 240 CAPSULE, COATED, EXTENDED RELEASE ORAL at 09:24

## 2020-01-01 RX ADMIN — Medication 10 ML: at 08:49

## 2020-01-01 RX ADMIN — BUDESONIDE AND FORMOTEROL FUMARATE DIHYDRATE 2 PUFF: 160; 4.5 AEROSOL RESPIRATORY (INHALATION) at 07:46

## 2020-01-01 RX ADMIN — IPRATROPIUM BROMIDE AND ALBUTEROL SULFATE 1 AMPULE: .5; 3 SOLUTION RESPIRATORY (INHALATION) at 11:49

## 2020-01-01 RX ADMIN — PANTOPRAZOLE SODIUM 40 MG: 40 TABLET, DELAYED RELEASE ORAL at 06:24

## 2020-01-01 RX ADMIN — GUAIFENESIN 600 MG: 600 TABLET, EXTENDED RELEASE ORAL at 19:24

## 2020-01-01 RX ADMIN — DRONABINOL 2.5 MG: 2.5 CAPSULE ORAL at 19:58

## 2020-01-01 RX ADMIN — GUAIFENESIN 600 MG: 600 TABLET, EXTENDED RELEASE ORAL at 20:34

## 2020-01-01 RX ADMIN — IPRATROPIUM BROMIDE AND ALBUTEROL SULFATE 1 AMPULE: .5; 3 SOLUTION RESPIRATORY (INHALATION) at 11:26

## 2020-01-01 RX ADMIN — METOPROLOL SUCCINATE 100 MG: 50 TABLET, FILM COATED, EXTENDED RELEASE ORAL at 08:40

## 2020-01-01 RX ADMIN — DIPHENHYDRAMINE HYDROCHLORIDE 25 MG: 50 INJECTION, SOLUTION INTRAMUSCULAR; INTRAVENOUS at 02:08

## 2020-01-01 RX ADMIN — DEXAMETHASONE 4 MG: 4 TABLET ORAL at 08:21

## 2020-01-01 RX ADMIN — HEPARIN SODIUM 7.69 UNITS/KG/HR: 10000 INJECTION, SOLUTION INTRAVENOUS at 21:00

## 2020-01-01 RX ADMIN — LORAZEPAM 0.5 MG: 2 INJECTION INTRAMUSCULAR; INTRAVENOUS at 04:05

## 2020-01-01 RX ADMIN — PANTOPRAZOLE SODIUM 40 MG: 40 TABLET, DELAYED RELEASE ORAL at 08:42

## 2020-01-01 RX ADMIN — SODIUM CHLORIDE, PRESERVATIVE FREE 10 ML: 5 INJECTION INTRAVENOUS at 10:22

## 2020-01-01 RX ADMIN — IPRATROPIUM BROMIDE AND ALBUTEROL SULFATE 1 AMPULE: .5; 3 SOLUTION RESPIRATORY (INHALATION) at 19:44

## 2020-01-01 RX ADMIN — DEXAMETHASONE 4 MG: 4 TABLET ORAL at 17:37

## 2020-01-01 RX ADMIN — DEXAMETHASONE 4 MG: 4 TABLET ORAL at 00:02

## 2020-01-01 RX ADMIN — GUAIFENESIN 600 MG: 600 TABLET, EXTENDED RELEASE ORAL at 21:26

## 2020-01-01 RX ADMIN — ATORVASTATIN CALCIUM 40 MG: 40 TABLET, FILM COATED ORAL at 19:37

## 2020-01-01 RX ADMIN — ATORVASTATIN CALCIUM 40 MG: 40 TABLET, FILM COATED ORAL at 20:32

## 2020-01-01 RX ADMIN — IPRATROPIUM BROMIDE AND ALBUTEROL SULFATE 1 AMPULE: .5; 3 SOLUTION RESPIRATORY (INHALATION) at 15:31

## 2020-01-01 RX ADMIN — SODIUM CHLORIDE: 9 INJECTION, SOLUTION INTRAVENOUS at 02:51

## 2020-01-01 RX ADMIN — ATORVASTATIN CALCIUM 20 MG: 20 TABLET, FILM COATED ORAL at 20:29

## 2020-01-01 RX ADMIN — PHYTONADIONE 5 MG: 5 TABLET ORAL at 16:15

## 2020-01-01 RX ADMIN — Medication 10 ML: at 20:32

## 2020-01-01 RX ADMIN — DEXAMETHASONE 4 MG: 4 TABLET ORAL at 08:54

## 2020-01-01 RX ADMIN — DEXTROSE MONOHYDRATE 25 G: 25 INJECTION, SOLUTION INTRAVENOUS at 16:47

## 2020-01-01 RX ADMIN — ACETAMINOPHEN 650 MG: 325 TABLET ORAL at 09:04

## 2020-01-01 RX ADMIN — HEPARIN SODIUM 2000 UNITS: 1000 INJECTION, SOLUTION INTRAVENOUS; SUBCUTANEOUS at 20:27

## 2020-01-01 RX ADMIN — MIDAZOLAM 0.5 MG: 1 INJECTION INTRAMUSCULAR; INTRAVENOUS at 13:35

## 2020-01-01 RX ADMIN — DILTIAZEM HYDROCHLORIDE 240 MG: 240 CAPSULE, COATED, EXTENDED RELEASE ORAL at 08:34

## 2020-01-01 RX ADMIN — ASPIRIN 81 MG 81 MG: 81 TABLET ORAL at 08:34

## 2020-01-01 RX ADMIN — DEXTROSE MONOHYDRATE 25 G: 25 INJECTION, SOLUTION INTRAVENOUS at 11:34

## 2020-01-01 RX ADMIN — GUAIFENESIN 600 MG: 600 TABLET, EXTENDED RELEASE ORAL at 08:54

## 2020-01-01 RX ADMIN — METOPROLOL SUCCINATE 100 MG: 50 TABLET, EXTENDED RELEASE ORAL at 08:54

## 2020-01-01 RX ADMIN — DEXAMETHASONE 4 MG: 4 TABLET ORAL at 08:48

## 2020-01-01 RX ADMIN — Medication 50 MCG: at 13:31

## 2020-01-01 RX ADMIN — ALBUTEROL SULFATE 10 MG: 2.5 SOLUTION RESPIRATORY (INHALATION) at 08:56

## 2020-01-01 RX ADMIN — DRONABINOL 2.5 MG: 2.5 CAPSULE ORAL at 20:16

## 2020-01-01 RX ADMIN — GUAIFENESIN 600 MG: 600 TABLET, EXTENDED RELEASE ORAL at 09:24

## 2020-01-01 RX ADMIN — DEXAMETHASONE SODIUM PHOSPHATE 4 MG: 4 INJECTION, SOLUTION INTRAMUSCULAR; INTRAVENOUS at 00:51

## 2020-01-01 RX ADMIN — TRAMADOL HYDROCHLORIDE 50 MG: 50 TABLET, FILM COATED ORAL at 15:36

## 2020-01-01 RX ADMIN — ATORVASTATIN CALCIUM 20 MG: 20 TABLET, FILM COATED ORAL at 21:00

## 2020-01-01 RX ADMIN — DILTIAZEM HYDROCHLORIDE 240 MG: 240 CAPSULE, COATED, EXTENDED RELEASE ORAL at 08:48

## 2020-01-01 RX ADMIN — DEXAMETHASONE 4 MG: 4 TABLET ORAL at 00:10

## 2020-01-01 RX ADMIN — MAGNESIUM SULFATE HEPTAHYDRATE 1 G: 1 INJECTION, SOLUTION INTRAVENOUS at 12:32

## 2020-01-01 RX ADMIN — HYDROCHLOROTHIAZIDE 25 MG: 25 TABLET ORAL at 08:41

## 2020-01-01 RX ADMIN — DOFETILIDE 250 MCG: 0.25 CAPSULE ORAL at 09:02

## 2020-01-01 RX ADMIN — DIGOXIN 250 MCG: 0.25 INJECTION INTRAMUSCULAR; INTRAVENOUS at 13:55

## 2020-01-01 RX ADMIN — BUDESONIDE AND FORMOTEROL FUMARATE DIHYDRATE 2 PUFF: 160; 4.5 AEROSOL RESPIRATORY (INHALATION) at 19:25

## 2020-01-01 RX ADMIN — BUDESONIDE AND FORMOTEROL FUMARATE DIHYDRATE 2 PUFF: 160; 4.5 AEROSOL RESPIRATORY (INHALATION) at 19:54

## 2020-01-01 RX ADMIN — Medication 10 ML: at 11:04

## 2020-01-01 RX ADMIN — DEXAMETHASONE 4 MG: 4 TABLET ORAL at 16:55

## 2020-01-01 RX ADMIN — APIXABAN 10 MG: 5 TABLET, FILM COATED ORAL at 21:04

## 2020-01-01 RX ADMIN — PANTOPRAZOLE SODIUM 40 MG: 40 TABLET, DELAYED RELEASE ORAL at 08:41

## 2020-01-01 RX ADMIN — Medication 500 UNITS: at 13:42

## 2020-01-01 RX ADMIN — ASPIRIN 81 MG 81 MG: 81 TABLET ORAL at 08:35

## 2020-01-01 RX ADMIN — HYDROCHLOROTHIAZIDE 25 MG: 25 TABLET ORAL at 09:03

## 2020-01-01 RX ADMIN — GUAIFENESIN 600 MG: 600 TABLET, EXTENDED RELEASE ORAL at 20:16

## 2020-01-01 RX ADMIN — DEXAMETHASONE 4 MG: 4 TABLET ORAL at 09:14

## 2020-01-01 RX ADMIN — INSULIN HUMAN 10 UNITS: 100 INJECTION, SOLUTION PARENTERAL at 09:13

## 2020-01-01 RX ADMIN — ENALAPRIL MALEATE 20 MG: 10 TABLET ORAL at 08:41

## 2020-01-01 RX ADMIN — TRAMADOL HYDROCHLORIDE 50 MG: 50 TABLET, FILM COATED ORAL at 09:56

## 2020-01-01 RX ADMIN — SODIUM CHLORIDE, PRESERVATIVE FREE 10 ML: 5 INJECTION INTRAVENOUS at 21:46

## 2020-01-01 RX ADMIN — DEXAMETHASONE 4 MG: 4 TABLET ORAL at 15:36

## 2020-01-01 RX ADMIN — MIDAZOLAM 1 MG: 1 INJECTION INTRAMUSCULAR; INTRAVENOUS at 13:31

## 2020-01-01 RX ADMIN — DEXAMETHASONE 4 MG: 4 TABLET ORAL at 21:52

## 2020-01-01 RX ADMIN — ATORVASTATIN CALCIUM 40 MG: 40 TABLET, FILM COATED ORAL at 21:13

## 2020-01-01 RX ADMIN — IPRATROPIUM BROMIDE AND ALBUTEROL SULFATE 1 AMPULE: .5; 3 SOLUTION RESPIRATORY (INHALATION) at 15:15

## 2020-01-01 RX ADMIN — METOPROLOL SUCCINATE 100 MG: 50 TABLET, EXTENDED RELEASE ORAL at 08:35

## 2020-01-01 RX ADMIN — GUAIFENESIN 600 MG: 600 TABLET, EXTENDED RELEASE ORAL at 12:27

## 2020-01-01 RX ADMIN — DEXAMETHASONE 4 MG: 4 TABLET ORAL at 00:39

## 2020-01-01 RX ADMIN — DOFETILIDE 500 MCG: 0.25 CAPSULE ORAL at 11:34

## 2020-01-01 RX ADMIN — FUROSEMIDE 40 MG: 40 TABLET ORAL at 11:34

## 2020-01-01 RX ADMIN — Medication 10 ML: at 08:58

## 2020-01-01 RX ADMIN — DOFETILIDE 500 MCG: 0.25 CAPSULE ORAL at 21:06

## 2020-01-01 RX ADMIN — METOPROLOL SUCCINATE 100 MG: 50 TABLET, EXTENDED RELEASE ORAL at 15:15

## 2020-01-01 RX ADMIN — ATORVASTATIN CALCIUM 40 MG: 40 TABLET, FILM COATED ORAL at 20:34

## 2020-01-01 RX ADMIN — PIPERACILLIN AND TAZOBACTAM 3.38 G: 3; .375 INJECTION, POWDER, LYOPHILIZED, FOR SOLUTION INTRAVENOUS at 17:34

## 2020-01-01 RX ADMIN — DEXAMETHASONE 4 MG: 4 TABLET ORAL at 08:15

## 2020-01-01 RX ADMIN — DILTIAZEM HYDROCHLORIDE 240 MG: 240 CAPSULE, COATED, EXTENDED RELEASE ORAL at 09:06

## 2020-01-01 RX ADMIN — ASPIRIN 81 MG 81 MG: 81 TABLET ORAL at 09:07

## 2020-01-01 RX ADMIN — SODIUM CHLORIDE 80 ML: 0.9 INJECTION, SOLUTION INTRAVENOUS at 10:22

## 2020-01-01 RX ADMIN — ATORVASTATIN CALCIUM 40 MG: 40 TABLET, FILM COATED ORAL at 19:57

## 2020-01-01 RX ADMIN — ATORVASTATIN CALCIUM 20 MG: 20 TABLET, FILM COATED ORAL at 22:16

## 2020-01-01 RX ADMIN — BUDESONIDE AND FORMOTEROL FUMARATE DIHYDRATE 2 PUFF: 160; 4.5 AEROSOL RESPIRATORY (INHALATION) at 19:00

## 2020-01-01 RX ADMIN — APIXABAN 10 MG: 5 TABLET, FILM COATED ORAL at 20:32

## 2020-01-01 RX ADMIN — DILTIAZEM HYDROCHLORIDE 10 MG: 5 INJECTION INTRAVENOUS at 10:08

## 2020-01-01 RX ADMIN — ENOXAPARIN SODIUM 135 MG: 150 INJECTION SUBCUTANEOUS at 20:58

## 2020-01-01 RX ADMIN — DEXAMETHASONE 4 MG: 4 TABLET ORAL at 01:01

## 2020-01-01 RX ADMIN — METOPROLOL SUCCINATE 100 MG: 50 TABLET, EXTENDED RELEASE ORAL at 08:21

## 2020-01-01 RX ADMIN — ASPIRIN 81 MG 81 MG: 81 TABLET ORAL at 08:23

## 2020-01-01 RX ADMIN — ONDANSETRON 4 MG: 2 INJECTION INTRAMUSCULAR; INTRAVENOUS at 11:10

## 2020-01-01 RX ADMIN — IPRATROPIUM BROMIDE AND ALBUTEROL SULFATE 1 AMPULE: .5; 3 SOLUTION RESPIRATORY (INHALATION) at 11:08

## 2020-01-01 RX ADMIN — GUAIFENESIN 600 MG: 600 TABLET, EXTENDED RELEASE ORAL at 21:13

## 2020-01-01 RX ADMIN — PANTOPRAZOLE SODIUM 40 MG: 40 TABLET, DELAYED RELEASE ORAL at 05:52

## 2020-01-01 RX ADMIN — DEXAMETHASONE 4 MG: 4 TABLET ORAL at 08:02

## 2020-01-01 RX ADMIN — SODIUM CHLORIDE, PRESERVATIVE FREE 10 ML: 5 INJECTION INTRAVENOUS at 08:36

## 2020-01-01 RX ADMIN — BUDESONIDE AND FORMOTEROL FUMARATE DIHYDRATE 2 PUFF: 160; 4.5 AEROSOL RESPIRATORY (INHALATION) at 19:49

## 2020-01-01 RX ADMIN — DILTIAZEM HYDROCHLORIDE 240 MG: 240 CAPSULE, COATED, EXTENDED RELEASE ORAL at 08:42

## 2020-01-01 RX ADMIN — GUAIFENESIN 600 MG: 600 TABLET, EXTENDED RELEASE ORAL at 20:57

## 2020-01-01 RX ADMIN — ONDANSETRON 4 MG: 2 INJECTION INTRAMUSCULAR; INTRAVENOUS at 01:37

## 2020-01-01 RX ADMIN — DEXAMETHASONE 4 MG: 4 TABLET ORAL at 23:34

## 2020-01-01 RX ADMIN — HEPARIN SODIUM 4000 UNITS: 1000 INJECTION INTRAVENOUS; SUBCUTANEOUS at 09:50

## 2020-01-01 RX ADMIN — METOPROLOL SUCCINATE 100 MG: 50 TABLET, EXTENDED RELEASE ORAL at 09:26

## 2020-01-01 RX ADMIN — DILTIAZEM HYDROCHLORIDE 240 MG: 240 CAPSULE, COATED, EXTENDED RELEASE ORAL at 10:08

## 2020-01-01 RX ADMIN — IPRATROPIUM BROMIDE AND ALBUTEROL SULFATE 1 AMPULE: .5; 3 SOLUTION RESPIRATORY (INHALATION) at 19:00

## 2020-01-01 ASSESSMENT — PAIN DESCRIPTION - PAIN TYPE
TYPE: ACUTE PAIN

## 2020-01-01 ASSESSMENT — PAIN SCALES - GENERAL
PAINLEVEL_OUTOF10: 2
PAINLEVEL_OUTOF10: 0
PAINLEVEL_OUTOF10: 2
PAINLEVEL_OUTOF10: 3
PAINLEVEL_OUTOF10: 0
PAINLEVEL_OUTOF10: 2
PAINLEVEL_OUTOF10: 0
PAINLEVEL_OUTOF10: 0
PAINLEVEL_OUTOF10: 3
PAINLEVEL_OUTOF10: 4
PAINLEVEL_OUTOF10: 0
PAINLEVEL_OUTOF10: 3
PAINLEVEL_OUTOF10: 0
PAINLEVEL_OUTOF10: 3
PAINLEVEL_OUTOF10: 0
PAINLEVEL_OUTOF10: 6
PAINLEVEL_OUTOF10: 0
PAINLEVEL_OUTOF10: 1
PAINLEVEL_OUTOF10: 0
PAINLEVEL_OUTOF10: 7
PAINLEVEL_OUTOF10: 2
PAINLEVEL_OUTOF10: 0
PAINLEVEL_OUTOF10: 3
PAINLEVEL_OUTOF10: 0
PAINLEVEL_OUTOF10: 3
PAINLEVEL_OUTOF10: 0
PAINLEVEL_OUTOF10: 1
PAINLEVEL_OUTOF10: 3
PAINLEVEL_OUTOF10: 0
PAINLEVEL_OUTOF10: 0
PAINLEVEL_OUTOF10: 5
PAINLEVEL_OUTOF10: 0
PAINLEVEL_OUTOF10: 4
PAINLEVEL_OUTOF10: 0
PAINLEVEL_OUTOF10: 2
PAINLEVEL_OUTOF10: 0
PAINLEVEL_OUTOF10: 3
PAINLEVEL_OUTOF10: 0
PAINLEVEL_OUTOF10: 0
PAINLEVEL_OUTOF10: 2

## 2020-01-01 ASSESSMENT — ENCOUNTER SYMPTOMS
BACK PAIN: 0
BACK PAIN: 0
CONSTIPATION: 0
ABDOMINAL PAIN: 0
SHORTNESS OF BREATH: 1
BACK PAIN: 0
SINUS PRESSURE: 0
CONSTIPATION: 0
TROUBLE SWALLOWING: 0
CONSTIPATION: 0
NAUSEA: 0
NAUSEA: 0
CONSTIPATION: 0
DIARRHEA: 0
RHINORRHEA: 0
VOMITING: 0
SINUS PRESSURE: 0
COUGH: 0
SHORTNESS OF BREATH: 1
VOMITING: 0
WHEEZING: 0
VOMITING: 0
COUGH: 0
VOMITING: 0
SHORTNESS OF BREATH: 0
DIARRHEA: 0
CHEST TIGHTNESS: 0
COUGH: 0
WHEEZING: 0
DIARRHEA: 0
CHEST TIGHTNESS: 0
SINUS PRESSURE: 0
RHINORRHEA: 0
EYE REDNESS: 0
CHEST TIGHTNESS: 0
VOICE CHANGE: 0
VOICE CHANGE: 0
BACK PAIN: 0
NAUSEA: 0
NAUSEA: 0
WHEEZING: 0
CHOKING: 0
CONSTIPATION: 0
DIARRHEA: 0
COUGH: 0
CONSTIPATION: 0
COLOR CHANGE: 0
ABDOMINAL PAIN: 0
SHORTNESS OF BREATH: 1
RHINORRHEA: 0
VOICE CHANGE: 0
CHEST TIGHTNESS: 0
COUGH: 0
CHOKING: 0
COLOR CHANGE: 0
CHEST TIGHTNESS: 0
RHINORRHEA: 0
COUGH: 1
BACK PAIN: 0
SHORTNESS OF BREATH: 1
CONSTIPATION: 0
RHINORRHEA: 0
VOICE CHANGE: 0
VOMITING: 0
VOICE CHANGE: 0
WHEEZING: 0
SHORTNESS OF BREATH: 1
SHORTNESS OF BREATH: 1
BACK PAIN: 0
COLOR CHANGE: 0
VOMITING: 0
ABDOMINAL PAIN: 0
ABDOMINAL PAIN: 0
RHINORRHEA: 0
BLOOD IN STOOL: 0
DIARRHEA: 0
ABDOMINAL PAIN: 0
CHOKING: 0
CONSTIPATION: 0
ABDOMINAL PAIN: 0
CHEST TIGHTNESS: 0
ABDOMINAL PAIN: 0
SINUS PRESSURE: 0
SORE THROAT: 0
DIARRHEA: 0
SINUS PRESSURE: 0
SORE THROAT: 0
NAUSEA: 0
RHINORRHEA: 0
CHOKING: 0
SHORTNESS OF BREATH: 1
WHEEZING: 0
NAUSEA: 0
CHOKING: 0
BACK PAIN: 0
CHEST TIGHTNESS: 0
COUGH: 1
COUGH: 0
CHOKING: 0
COUGH: 0
VOMITING: 0
SINUS PRESSURE: 0
DIARRHEA: 0
RHINORRHEA: 0
SINUS PRESSURE: 0
ABDOMINAL PAIN: 0
VOMITING: 0
VOICE CHANGE: 0
VOICE CHANGE: 0
DIARRHEA: 0
NAUSEA: 0
CHOKING: 0
NAUSEA: 0
SINUS PRESSURE: 0
EYE DISCHARGE: 0
WHEEZING: 0

## 2020-01-01 ASSESSMENT — PAIN DESCRIPTION - PROGRESSION
CLINICAL_PROGRESSION: GRADUALLY IMPROVING
CLINICAL_PROGRESSION: NOT CHANGED
CLINICAL_PROGRESSION: GRADUALLY IMPROVING
CLINICAL_PROGRESSION: GRADUALLY IMPROVING

## 2020-01-01 ASSESSMENT — PAIN - FUNCTIONAL ASSESSMENT
PAIN_FUNCTIONAL_ASSESSMENT: PREVENTS OR INTERFERES SOME ACTIVE ACTIVITIES AND ADLS
PAIN_FUNCTIONAL_ASSESSMENT: ACTIVITIES ARE NOT PREVENTED
PAIN_FUNCTIONAL_ASSESSMENT: PREVENTS OR INTERFERES SOME ACTIVE ACTIVITIES AND ADLS
PAIN_FUNCTIONAL_ASSESSMENT: ACTIVITIES ARE NOT PREVENTED
PAIN_FUNCTIONAL_ASSESSMENT: PREVENTS OR INTERFERES SOME ACTIVE ACTIVITIES AND ADLS
PAIN_FUNCTIONAL_ASSESSMENT: ACTIVITIES ARE NOT PREVENTED
PAIN_FUNCTIONAL_ASSESSMENT: ACTIVITIES ARE NOT PREVENTED

## 2020-01-01 ASSESSMENT — PAIN DESCRIPTION - FREQUENCY
FREQUENCY: INTERMITTENT
FREQUENCY: CONTINUOUS
FREQUENCY: INTERMITTENT

## 2020-01-01 ASSESSMENT — PAIN DESCRIPTION - DIRECTION
RADIATING_TOWARDS: NON-RADIATING
RADIATING_TOWARDS: NON-RADIATING
RADIATING_TOWARDS: NON RADIATING

## 2020-01-01 ASSESSMENT — PAIN DESCRIPTION - LOCATION
LOCATION: SHOULDER
LOCATION: CHEST
LOCATION: CHEST
LOCATION: SHOULDER
LOCATION: CHEST
LOCATION: ABDOMEN
LOCATION: SHOULDER
LOCATION: CHEST

## 2020-01-01 ASSESSMENT — PAIN DESCRIPTION - ONSET
ONSET: GRADUAL
ONSET: GRADUAL

## 2020-01-01 ASSESSMENT — PAIN DESCRIPTION - ORIENTATION
ORIENTATION: MID;LEFT
ORIENTATION: MID;LEFT
ORIENTATION: RIGHT;MID
ORIENTATION: LEFT
ORIENTATION: LEFT;UPPER
ORIENTATION: RIGHT;MID
ORIENTATION: RIGHT;MID

## 2020-01-01 ASSESSMENT — PATIENT HEALTH QUESTIONNAIRE - PHQ9
SUM OF ALL RESPONSES TO PHQ9 QUESTIONS 1 & 2: 0
1. LITTLE INTEREST OR PLEASURE IN DOING THINGS: 0
2. FEELING DOWN, DEPRESSED OR HOPELESS: 0
SUM OF ALL RESPONSES TO PHQ QUESTIONS 1-9: 0
SUM OF ALL RESPONSES TO PHQ QUESTIONS 1-9: 0

## 2020-01-01 ASSESSMENT — PAIN DESCRIPTION - DESCRIPTORS
DESCRIPTORS: DISCOMFORT
DESCRIPTORS: SORE
DESCRIPTORS: DISCOMFORT
DESCRIPTORS: DISCOMFORT
DESCRIPTORS: DULL
DESCRIPTORS: DISCOMFORT
DESCRIPTORS: DISCOMFORT;SORE

## 2020-02-07 NOTE — TELEPHONE ENCOUNTER
Next Visit Date:  Future Appointments   Date Time Provider Conner Wellsi   6/12/2020  1:00 PM Geoff Brunner  [address] [facility]   Topic Date Due    DTaP/Tdap/Td vaccine (1 - Tdap) 04/25/1957    Shingles Vaccine (1 of 2) 04/25/1996    Annual Wellness Visit (AWV)  06/11/2019    Low dose CT lung screening  07/02/2019    Flu vaccine (1) 09/01/2019    Lipid screen  06/03/2020    Potassium monitoring  06/03/2020    Creatinine monitoring  06/03/2020    Colon cancer screen colonoscopy  04/27/2026    Pneumococcal 65+ years Vaccine  Completed    Hepatitis C screen  Completed    AAA screen  Addressed    Hepatitis A vaccine  Aged Out    Hepatitis B vaccine  Aged Out    Hib vaccine  Aged Out    Meningococcal (ACWY) vaccine  Aged Out       No results found for: LABA1C          ( goal A1C is < 7)   No results found for: LABMICR  LDL Cholesterol (mg/dL)   Date Value   06/03/2019 62   06/14/2018 64       (goal LDL is <100)   AST (U/L)   Date Value   06/03/2019 20     ALT (U/L)   Date Value   06/03/2019 13     BUN (mg/dL)   Date Value   06/03/2019 14     BP Readings from Last 3 Encounters:   12/12/19 138/64   06/11/19 (!) 150/90   12/11/18 (!) 153/66          (goal 120/80)    All Future Testing planned in [facility]  Lab Frequency Next Occurrence   CT LUNG SCREENING Once 07/10/2019   Comprehensive Metabolic Panel Once 37/98/5599   CBC Auto Differential Once 06/12/2020   Lipid Panel Once 06/12/2020   TSH with Reflex Once 06/12/2020               Patient Active Problem List:     HTN (hypertension)     IGT (impaired glucose tolerance)     Hypertriglyceridemia     Smoker unmotivated to quit     Pure hypercholesterolemia

## 2020-05-04 PROBLEM — I48.91 ATRIAL FIBRILLATION WITH RVR (HCC): Status: ACTIVE | Noted: 2020-01-01

## 2020-05-04 NOTE — PATIENT INSTRUCTIONS
with your local health department check their available hours. If you have a medical emergency and need to call 911, notify the dispatch personnel that you have, or are being evaluated for COVID-19. If possible, put on a facemask before emergency medical services arrive. Discontinuing home isolation  Patients with confirmed COVID-19 should remain under home isolation precautions until the risk of secondary transmission to others is thought to be low. The decision to discontinue home isolation precautions should be made on a case-by-case basis, in consultation with your physician and the health department. Please do NOT make this decision on your own. If your results of the COVID-19 test is NEGATIVE -     The patient may stop isolation, in consultation with your health care provider, typically when: Your healthcare provider has determined that the cause of the illness is NOT COVID-19 and approves your return to work. OR  Ten (10) days have passed since onset of symptoms AND three days (72 hours) have passed with no fever without taking medication (like Tylenol) to reduce fever,  respiratory symptoms have resolved and you have been evaluated by your health care provider. Please follow up with your physician for evaluation about this. The following websites are the best places for up to date information on this fluid situation. https://coronavirus. [place].gov/wps/portal/gov/covid-19/home/local-health-districts-and-providers/guidance-for-covid-19-exposure-management    Preventing the Spread of Coronavirus Disease 2019 in Homes and Residential Communities     For the most recent information go to [employer].  https://coronavirus. [place].gov/wps/portal/gov/covid-19/home/local-health-districts-and-providers/guidance-for-covid-19-exposure-management

## 2020-05-04 NOTE — ED PROVIDER NOTES
[address] ED  EMERGENCY DEPARTMENT ENCOUNTER   ATTENDING ATTESTATION     Pt Name: Tosha Clancy  MRN: [id number]  Brynn 1945  Date of evaluation: 5/4/20       Tosha Clancy is a 77 y.o. male who presents with Shortness of Breath (onset 2 weeks, seen PCP today); Shoulder Pain (left); and Mass (left neck)      MDM:     Patient's EKG shows A. fib with a rate of 122, QRS and QTc intervals unremarkable, patient has normal axis no ST elevations or depressions, nonspecific EKG. Vitals:   Vitals:    05/04/20 1443 05/04/20 1454   BP: (!) 138/90    Pulse: 67 124   Resp: 22    Temp: 97.6 °F (36.4 °C)    TempSrc: Oral    SpO2: 96%    Weight: 286 lb 6.4 oz (129.9 kg)    Height: 6' 2\" (1.88 m)          I personally evaluated and examined the patient in conjunction with the Midlevel provider and agree with the assessment, treatment plan, and disposition of the patient as recorded by the midlevel. I performed a history and physical examination of the patient and discussed management with the midlevel. I reviewed the midlevels note and agree with the documented findings and plan of care. Any areas of disagreement are noted on the chart. I was personally present for the key portions of any procedures. I have documented in the chart those procedures where I was not present during the key portions. I have personally reviewed all images and agree with the midlevel's interpretation. I have reviewed the emergency nurses triage note. I agree with the chief complaint, past medical history, past surgical history, allergies, medications, social and family history as documented unless otherwise noted.     Mir Haskins MD  Attending Emergency  Physician                  Tyrell Jacob MD  05/04/20 0043

## 2020-05-04 NOTE — ED PROVIDER NOTES
reports that he has been smoking cigars and cigarettes. He has a 50.00 pack-year smoking history. He has never used smokeless tobacco. He reports previous alcohol use. He reports that he does not use drugs. REVIEW OF SYSTEMS    (2-9 systems for level 4, 10 or more for level 5)     Review of Systems   Constitutional: Negative for chills, diaphoresis, fever and unexpected weight change. HENT: Negative for congestion, rhinorrhea, sinus pressure and sore throat. Respiratory: Positive for shortness of breath. Negative for wheezing. Cardiovascular: Negative for chest pain and palpitations. Gastrointestinal: Negative for abdominal pain, constipation, diarrhea, nausea and vomiting. Genitourinary: Negative for dysuria and hematuria. Musculoskeletal: Negative for arthralgias and myalgias. Skin: Negative for color change and rash. Neurological: Negative for dizziness, weakness and headaches. Hematological: Negative for adenopathy. Except as noted above the remainder of the review of systems was reviewed and negative. PHYSICAL EXAM    (up to 7 for level 4, 8 or more for level 5)     ED Triage Vitals [05/04/20 1443]   BP Temp Temp Source Pulse Resp SpO2 Height Weight   (!) 138/90 97.6 °F (36.4 °C) Oral 67 22 96 % 6' 2\" (1.88 m) 286 lb 6.4 oz (129.9 kg)       Physical Exam  Vitals signs reviewed. Constitutional:       Appearance: He is well-developed. HENT:      Head: Normocephalic and atraumatic. Eyes:      Conjunctiva/sclera: Conjunctivae normal.      Pupils: Pupils are equal, round, and reactive to light. Neck:      Musculoskeletal: Normal range of motion and neck supple. Cardiovascular:      Rate and Rhythm: Normal rate and regular rhythm. Pulmonary:      Effort: Pulmonary effort is normal. No respiratory distress. Breath sounds: Normal breath sounds. No stridor. Abdominal:      General: Bowel sounds are normal.      Palpations: Abdomen is soft.    Musculoskeletal: Normal Medical Decision Making: Is found to have a new onset atrial fibrillation with rapid ventricular response. He was given a dose of IV Cardizem. 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 He will be admitted to the hospital for further evaluation follow-up. Case was discussed with internal medicine  Medications   sodium chloride flush 0.9 % injection 10 mL (10 mLs Intravenous Given 5/4/20 1616)   0.9 % sodium chloride bolus (0 mLs Intravenous Stopped 5/4/20 1621)   iopamidol (ISOVUE-370) 76 % injection 75 mL (75 mLs Intravenous Given 5/4/20 1616)   dilTIAZem injection 10 mg (10 mg Intravenous Given 5/4/20 1747)       CONSULTS:  IP CONSULT TO INTERNAL MEDICINE  IP CONSULT TO CARDIOLOGY  IP CONSULT TO HEM/ONC  CRITICAL CARE TIME     Due to the high probability of sudden and clinically significant deterioration in the patient's condition he required highest level of my preparedness to intervene urgently. I provided critical care time including documentation time, medication orders and management, reevaluation, vital sign assessment, ordering and reviewing of of lab tests ordering and reviewing of x-ray studies, and admission orders. Aggregate critical care time is 34 minutes including only time during which I was engaged in work directly related to his care and did not include time spent treating other patients simultaneously. FINAL IMPRESSION      1.  New onset a-fib [facility])          DISPOSITION/PLAN   DISPOSITION Admitted 05/04/2020 05:31:16 PM      PATIENT REFERRED TO:   Jackson Salgado MD  [address] [place] [address]  [phone]            DISCHARGE MEDICATIONS:     New Prescriptions    No medications on file           (Please note that portions of this note were completed with a voice recognition program.  Efforts were made to edit the dictations but occasionally words

## 2020-05-04 NOTE — TELEPHONE ENCOUNTER
Reason for Disposition   MODERATE longstanding difficulty breathing (e.g., speaks in phrases, SOB even at rest, pulse 100-120) and SAME as normal    Answer Assessment - Initial Assessment Questions  1. RESPIRATORY STATUS: \"Describe your breathing? \" (e.g., wheezing, shortness of breath, unable to speak, severe coughing)       Wife speaking for patient, he admits to SOB  2. ONSET: \"When did this breathing problem begin? \"       Saturday  3. PATTERN \"Does the difficult breathing come and go, or has it been constant since it started? \"       Comes and goes  4. SEVERITY: \"How bad is your breathing? \" (e.g., mild, moderate, severe)     - MILD: No SOB at rest, mild SOB with walking, speaks normally in sentences, can lay down, no retractions, pulse < 100.     - MODERATE: SOB at rest, SOB with minimal exertion and prefers to sit, cannot lie down flat, speaks in phrases, mild retractions, audible wheezing, pulse 100-120.     - SEVERE: Very SOB at rest, speaks in single words, struggling to breathe, sitting hunched forward, retractions, pulse > 120       Moderate, he is sleeping right now  5. RECURRENT SYMPTOM: \"Have you had difficulty breathing before? \" If so, ask: \"When was the last time? \" and \"What happened that time? \"       January-shoveling snow  6. CARDIAC HISTORY: \"Do you have any history of heart disease? \" (e.g., heart attack, angina, bypass surgery, angioplasty)       no  7. LUNG HISTORY: \"Do you have any history of lung disease? \"  (e.g., pulmonary embolus, asthma, emphysema)      no  8. CAUSE: \"What do you think is causing the breathing problem? \"       smoking  9. OTHER SYMPTOMS: \"Do you have any other symptoms? (e.g., dizziness, runny nose, cough, chest pain, fever)      Cough when smoking  10. PREGNANCY: \"Is there any chance you are pregnant? \" \"When was your last menstrual period? \"        n/a  11. TRAVEL: \"Have you traveled out of the country in the last month? \" (e.g., travel history, exposures) no    Protocols used: BREATHING DIFFICULTY-ADULT-[place]

## 2020-05-04 NOTE — PROGRESS NOTES
COVID-19 limit contact with animals until more information is known about the virus. When possible, have another member of your household care for your animals while you are sick. If you are sick with COVID-19, avoid contact with your pet, including petting, snuggling, being kissed or licked, and sharing food. If you must care for your pet or be around animals while you are sick, wash your hands before and after you interact with pets and wear a facemask. Call ahead before visiting your doctor  If you have a medical appointment, call the healthcare provider and tell them that you have or may have COVID-19. This will help the healthcare providers office take steps to keep other people from getting infected or exposed. Wear a facemask  You should wear a facemask when you are around other people (e.g., sharing a room or vehicle) or pets and before you enter a healthcare providers office. If you are not able to wear a facemask (for example, because it causes trouble breathing), then people who live with you should not stay in the same room with you; they should also wear a facemask if they enter your room. Cover your coughs and sneezes  Cover your mouth and nose with a tissue when you cough or sneeze. Throw used tissues in a lined trash can. Immediately wash your hands with soap and water for at least 20 seconds or, if soap and water are not available, clean your hands with an alcohol-based hand  that contains at least 60% alcohol. Clean your hands often  Wash your hands often with soap and water for at least 20 seconds, especially after blowing your nose, coughing, or sneezing; going to the bathroom; and before eating or preparing food. If soap and water are not readily available, use an alcohol-based hand  with at least 60% alcohol, covering all surfaces of your hands and rubbing them together until they feel dry. Soap and water are the best option if hands are visibly dirty.  Avoid touching be low. The decision to discontinue home isolation precautions should be made on a case-by-case basis, in consultation with your physician and the health department. Please do NOT make this decision on your own. If your results of the COVID-19 test is NEGATIVE -     The patient may stop isolation, in consultation with your health care provider, typically when: Your healthcare provider has determined that the cause of the illness is NOT COVID-19 and approves your return to work. OR  Ten (10) days have passed since onset of symptoms AND three days (72 hours) have passed with no fever without taking medication (like Tylenol) to reduce fever,  respiratory symptoms have resolved and you have been evaluated by your health care provider. Please follow up with your physician for evaluation about this. The following websites are the best places for up to date information on this fluid situation. https://coronavirus. [place].gov/wps/portal/gov/covid-19/home/local-health-districts-and-providers/guidance-for-covid-19-exposure-management    Preventing the Spread of Coronavirus Disease 2019 in Homes and Residential Communities     For the most recent information go to [email or web address]  https://coronavirus. [place].gov/wps/portal/gov/covid-19/home/local-health-districts-and-providers/guidance-for-covid-19-exposure-management        Data Unavailable            . This visit was provided as a focused evaluation during the COVID -19 pandemic/national emergency. A comprehensive review of all previous patient history and testing was not conducted. Pertinent findings were elicited during the visit.

## 2020-05-05 NOTE — PROGRESS NOTES
[facility]      Daily Progress Note     Admit Date: 5/4/2020  Bed/Room No.  1101/1101-01  Admitting Physician : Jamil Byrnes MD  Code Status :Full Code  Hospital Day:  LOS: 1 day   Chief Complaint:     Chief Complaint   Patient presents with    Shortness of Breath     onset 2 weeks, seen PCP today    Shoulder Pain     left    Mass     left neck     Principal Problem:    New onset a-fib ([address]  Active Problems:    Essential hypertension    Dyslipidemia    Mass of thoracic structure    Class 2 severe obesity due to excess calories with serious comorbidity in adult [facility])  Resolved Problems:    * No resolved hospital problems. *    Subjective : Interval History/Significant events :  05/05/20    Patient remains on heparin infusion. Heart rate is controlled. Patient complains of fatigue. He denies any dyspnea at rest.  Patient does not complain of any chest pain, fever, cough, productive sputum. Vitals - Stable afebrile  Labs - creatinine stable. Elevated LD. Nursing notes , Consults notes reviewed. Overnight events and updates discussed with Nursing staff . Background History:         Shyanne Frey is 77 y.o. male  Who was admitted to the hospital on 5/4/2020 for treatment of New onset a-fib ([facility] [address] Patient came to the hospital shortness of breath which was progressively worsening for the last 2 weeks associated with nonproductive cough, palpitations. Patient also reported left-sided swelling and neck which has been painful intermittently. Patient was evaluated by PCP and was found to have irregular rhythm with increased heart rate. He was sent for further evaluation. Patient had outpatient nasopharyngeal swab for SARS-CoV-2 which is pending. He has underlying history of hypertension, dyslipidemia. He is former smoker and quit about 3 years before. Evaluation emergency room showed A. fib with RVR.   Patient was given Cardizem bolus and started on Cardizem infusion

## 2020-05-05 NOTE — H&P
Massive mediastinal and left hilar adenopathy. Multiple conglomerate left lower lobe masses. Mild left-sided hydronephrosis with suspected partially obstructing calculi within the left renal pelvis. Assessment :      Hospital Problems           Last Modified POA    * (Principal) New onset a-fib ([address] 5/4/2020 Yes    Essential hypertension 5/4/2020 Yes    Dyslipidemia 5/4/2020 Yes    Mass of thoracic structure 5/4/2020 Yes    Class 2 severe obesity due to excess calories with serious comorbidity in adult [facility]) 5/4/2020 Yes          Plan:     Patient status inpatient in the  Progressive Unit/Step down    1. CXR, CT chest, EKG reviewed. 2. Consult cardiology, new onset atrial fibrillation with rapid ventricular response. 3. Continue cardizem and heparin drip. 4. Echocardiogram.  5. Consult hematology/oncology, new thoracic mass. 6. Hypertension- continue home antihypertensives. 7. Monitor vitals. 8. Dyslipidemia- continue atorvastatin, check lipids. 9. Supplemental oxygen as needed. 10. Follow chemistries, check TSH and CMP. 11. Tobacco use, recently quit smoking. Continue smoking cessation. 12. Obesity- lifestyle modification. 13. Cardiac diet. 14. Activity as tolerated with assist.   15. PT/OT as appropriate. 16. Outpatient COVID-19 ordered. CXR eludes to possible obstructive atelectasis. This is not mentioned on CT. Shortness of breath suspected to be secondary to afib. Will obtain LD, ferritin, procalcitonin, CRP. If compelling will transfer to [facility]. Addendum: WBC 12.2, lymphocytes 9, LD 1,354, ferritin 656. CRP and procalcitonin pending. LD may be elevated secondary to malignancy. However, will transfer to COVID unit pending rule out. Inpatient COVID swab ordered upon transfer.  Electronically signed by LEROY Alvarez CNP on 5/5/2020 at 1:08AM.     Consultations:   IP CONSULT TO INTERNAL MEDICINE  IP CONSULT TO CARDIOLOGY  IP CONSULT TO HEM/ONC    Patient is admitted as

## 2020-05-05 NOTE — CONSULTS
Reason for Consult: Atrial fibrillation with RVR  Requesting Physician: Josias Albert MD    CHIEF COMPLAINT: Shortness of breath and neck swelling    History Obtained From:  Patient's RN, electronic medical record    HISTORY OF PRESENT ILLNESS:      The patient is a 77 y.o. male with significant past medical history of hypertension and hyperlipidemia who presents with worsening shortness of breath with exertion and is currently in isolation for COVID-19 rule out. On admission, he was found to have atrial fibrillation with RVR which is a new finding for him and was started on a Cardizem and heparin drip. Patient has now converted to sinus rhythm and breathing remains stable. He had denied any chest pain. He also reported left neck swelling and was found to have possible pulmonary embolus versus tumor infiltration and massive mediastinal and left hilar adenopathy on CT scan. Past Medical History:    Past Medical History:   Diagnosis Date    Hyperlipidemia     Hypertension     Kidney stones      Past Surgical History:    History reviewed. No pertinent surgical history. Home Medications:  Prior to Admission medications    Medication Sig Start Date End Date Taking?  Authorizing Provider   amLODIPine (NORVASC) 10 MG tablet TAKE 1 TABLET DAILY 2/14/20  Yes Juwan Fontaine MD   enalapril (VASOTEC) 20 MG tablet TAKE 1 TABLET DAILY 2/14/20  Yes Juwan Fontaine MD   metoprolol succinate (TOPROL XL) 100 MG extended release tablet TAKE 1 TABLET DAILY 2/14/20  Yes Juwan Fontaine MD   hydrochlorothiazide (HYDRODIURIL) 25 MG tablet TAKE 1 TABLET DAILY 2/14/20  Yes Juwan Fontaine MD   atorvastatin (LIPITOR) 20 MG tablet TAKE 1 TABLET BY MOUTH ONE TIME A DAY 2/14/20  Yes Juwan Fontaine MD     Current Medications:    Current Facility-Administered Medications   Medication Dose Route Frequency Provider Last Rate Last Dose    sodium chloride flush 0.9 % injection 10 mL  10 mL Intravenous PRN Kareen Ventura MD   10 mL at 05/04/20 1616    amLODIPine (NORVASC) tablet 10 mg  10 mg Oral Daily LEROY Porter - NP   10 mg at 05/04/20 2100    atorvastatin (LIPITOR) tablet 20 mg  20 mg Oral Nightly LEROY Porter - NP   20 mg at 05/04/20 2100    enalapril (VASOTEC) tablet 20 mg  20 mg Oral Daily LEROY Porter - NP   20 mg at 05/04/20 2100    hydroCHLOROthiazide (HYDRODIURIL) tablet 25 mg  25 mg Oral Daily Matt Clifton APRN - NP   25 mg at 05/04/20 2100    metoprolol succinate (TOPROL XL) extended release tablet 100 mg  100 mg Oral Daily LEROY Porter - NP   100 mg at 05/04/20 2100    sodium chloride flush 0.9 % injection 10 mL  10 mL Intravenous 2 times per day LEROY Porter - NP   10 mL at 05/04/20 2100    sodium chloride flush 0.9 % injection 10 mL  10 mL Intravenous PRN LEROY Porter NP        acetaminophen (TYLENOL) tablet 650 mg  650 mg Oral Q6H PRN LEROY Porter - TERI        Or   Heddie Catena acetaminophen (TYLENOL) suppository 650 mg  650 mg Rectal Q6H PRN LEROY Porter - TERI        polyethylene glycol (GLYCOLAX) packet 17 g  17 g Oral Daily PRN LEROY Porter NP        promethazine (PHENERGAN) tablet 12.5 mg  12.5 mg Oral Q6H PRN LEROY Porter - TERI        Or    ondansetron [facility]) injection 4 mg  4 mg Intravenous Q6H PRN LEROY Porter - NP        nicotine (NICODERM CQ) 21 MG/24HR 1 patch  1 patch Transdermal Daily PRN LEROY Porter - NP        aspirin chewable tablet 81 mg  81 mg Oral Daily LEROY Porter NP   81 mg at 05/04/20 2100    dilTIAZem 125 mg in dextrose 5 % 125 mL infusion  10 mg/hr Intravenous Continuous LEROY Porter NP 2.5 mL/hr at 05/05/20 0020 2.5 mg/hr at 05/05/20 0020    heparin (porcine) injection 4,000 Units  4,000 Units Intravenous PRN LEROY Porter NP        heparin (porcine) injection 2,000 Units  2,000 Units Intravenous PRN LEROY Porter NP        heparin 25,000 units in dextrose 5% 250 mL infusion  1,000 Units/hr Intravenous Continuous Alisha AUBREE RichardsonN - NP 10 mL/hr at 20 2100 7.69 Units/kg/hr at 20 2100     Allergies:  Patient has no known allergies.     Social History:    Social History     Socioeconomic History    Marital status:      Spouse name: Not on file    Number of children: Not on file    Years of education: Not on file    Highest education level: Not on file   Occupational History    Not on file   Social Needs    Financial resource strain: Not on file    Food insecurity     Worry: Not on file     Inability: Not on file    Transportation needs     Medical: Not on file     Non-medical: Not on file   Tobacco Use    Smoking status: Former Smoker     Packs/day: 1.00     Years: 50.00     Pack years: 50.00     Types: Cigars, Cigarettes     Last attempt to quit: 2020     Years since quittin.0    Smokeless tobacco: Never Used   Substance and Sexual Activity    Alcohol use: Not Currently     Comment: rare    Drug use: No    Sexual activity: Yes     Partners: Female   Lifestyle    Physical activity     Days per week: Not on file     Minutes per session: Not on file    Stress: Not on file   Relationships    Social connections     Talks on phone: Not on file     Gets together: Not on file     Attends Anabaptist service: Not on file     Active member of club or organization: Not on file     Attends meetings of clubs or organizations: Not on file     Relationship status: Not on file    Intimate partner violence     Fear of current or ex partner: Not on file     Emotionally abused: Not on file     Physically abused: Not on file     Forced sexual activity: Not on file   Other Topics Concern    Not on file   Social History Narrative    Not on file     Family History:   Family History   Problem Relation Age of Onset    Hemochromatosis Sister     Heart Disease Mother     Heart Disease Father        · REVIEW OF SYSTEMS   Deferred due to 05/05/2020    GLUCOSE 112 03/14/2012     LIVER PROFILE:  Recent Labs     05/05/20  0319   AST 35   ALT 18   LABALBU 3.2*   ALKPHOS 66   BILITOT 0.48   PROT 6.6   ALBUMIN NOT REPORTED     FLP:    Lab Results   Component Value Date    CHOL 132 05/05/2020    TRIG 108 05/05/2020    HDL 36 05/05/2020    LDLCHOLESTEROL 74 05/05/2020     CT of the chest: 5/4/2020:  No acute central or segmental pulmonary embolus.       Questionable left lower lobe chronic nonocclusive pulmonary embolus versus   tumor infiltration.  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       Massive mediastinal and left hilar adenopathy.  Multiple conglomerate left   lower lobe masses.       Mild left-sided hydronephrosis with suspected partially obstructing calculi   within the left renal pelvis.           IMPRESSION    · Paroxysmal atrial fibrillation with RVR on Cardizem and heparin drip, converted to sinus rhythm- [id number] = 2  · Dyspnea, COVID-19 rule out  · Left neck swelling with massive mediastinal and left hilar adenopathy and left lower lobe masses, managed by others  · Partially obstructing renal calculi, managed by others  · Possible pulmonary embolus versus tumor infiltration-patient is on heparin drip, managed by others    RECOMMENDATIONS:     Continue current cardiac medications. Patient has converted to sinus rhythm with Cardizem drip, will start oral Cardizem and discontinue Cardizem drip, discontinue Norvasc. Continue heparin for now, patient will most likely need long-term oral anticoagulation for the atrial fibrillation and dosing will depend on pulmonary embolus rule out. Check echocardiogram after COVID-19 is ruled out. Management plan was discussed with David Low and Dr. Max Alfredo. Thank you for the consultation.     Electronically signed by LEROY Good CNP on 5/5/2020 at 5:37 AM     CC: Jackson Salgado MD

## 2020-05-06 NOTE — CARE COORDINATION
Discharge planning    Patient seen by cardiology and plan is for eliquis to start tomorrow. Angelic Sayres NP recommending anticoagulation . Will need to run for cost analysis and PA> await rx  .  Patient on heparin gtt, to have IR do bx of neck

## 2020-05-06 NOTE — PROGRESS NOTES
Pt returned from biopsy. Small bandaid to lower left neck, site negative. No complaints of pain. Returned to bed. F/U with cardiology regarding continuation of Heparin gtt and initiating oral anticoagulant. D/C planner to f/u with insurance.

## 2020-05-06 NOTE — PROGRESS NOTES
started on Cardizem infusion and heparin for anticoagulation. Further imaging of the chest showed masslike opacity in left hilum with airspace disease in mid and lower lungs. CT chest was negative for PE. 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 Also reported left renal calculus with mild left hydronephrosis. Lab evaluation showed leukocytosis 12.2, ALT 1354, ferritin 656. PMH:  Past Medical History:   Diagnosis Date    Hyperlipidemia     Hypertension     Kidney stones       Allergies: No Known Allergies   Medications :  dilTIAZem, 240 mg, Oral, Daily  atorvastatin, 20 mg, Oral, Nightly  enalapril, 20 mg, Oral, Daily  hydroCHLOROthiazide, 25 mg, Oral, Daily  metoprolol succinate, 100 mg, Oral, Daily  sodium chloride flush, 10 mL, Intravenous, 2 times per day  aspirin, 81 mg, Oral, Daily        Review of Systems   Review of Systems   Constitutional: Positive for fatigue. Negative for activity change, appetite change, fever and unexpected weight change. HENT: Negative for congestion, nosebleeds, rhinorrhea, sinus pressure, sneezing and voice change. Respiratory: Positive for shortness of breath. Negative for cough, choking, chest tightness and wheezing. Cardiovascular: Negative for chest pain, palpitations and leg swelling. Gastrointestinal: Negative for abdominal pain, constipation, diarrhea, nausea and vomiting. Genitourinary: Negative for difficulty urinating, discharge, dysuria, frequency and testicular pain. Musculoskeletal: Negative for back pain. Skin: Negative for rash. Neurological: Negative for dizziness, weakness, light-headedness and headaches. Hematological: Does not bruise/bleed easily. Psychiatric/Behavioral: Negative for agitation, behavioral problems, confusion, self-injury, sleep disturbance and suicidal ideas.      Objective :      Current Vitals : Temp: 97.5 °F (36.4 °C),  Pulse: 82,

## 2020-05-06 NOTE — PROGRESS NOTES
chair  Transfers  Sit to stand: Stand by assistance  Stand to sit: Stand by assistance     Cognition  Overall Cognitive Status: WFL  Perception  Overall Perceptual Status: WFL     Sensation  Overall Sensation Status: WFL        LUE AROM (degrees)  LUE AROM : WFL  RUE AROM (degrees)  RUE AROM : WFL  LUE Strength  Gross LUE Strength: WFL  LUE Strength Comment: DONNY DE LA CRUZ's 5/5  RUE Strength  Gross RUE Strength: WFL                   Plan   Plan  Times per week: 2-3 visits  Current Treatment Recommendations: Strengthening, Balance Training, Functional Mobility Training, Endurance Training, Equipment Evaluation, Education, & procurement, Patient/Caregiver Education & Training, Self-Care / ADL, Safety Education & Training            Goals  Short term goals  Time Frame for Short term goals: by discharge, pt will  Short term goal 1: demo and verb good understanding of fall prevention techs, EC/WS techs, and possible equip needs for home  Short term goal 2: demo I/MI with UB/LB ADLs with good safety and pacing, DME/AE as needed  Patient Goals   Patient goals : to go home       Therapy Time   Individual Concurrent Group Co-treatment   Time In 0922         Time Out 1000         Minutes 38              Written and verbal edu provided to pt on safe ADL completion techniques and tips during bathing/showering, and toileting; EC/WS techniques of pacing, posturing, body mechanics, planning and organizing tasks, avoiding fatigue, and task simplification in regards to ADLs of eating, grooming, bathing/showering, dressing, and IADLs of cooking, meal cleanup, marketing and meal planning, laundry, bed making, and housework. Educated patient on pursed lip breathing to increase control of breathing. Initiated by breathing through the nose and blowing out with pursed lip to increase O2 into lungs.        Antonino Zelaya, OT

## 2020-05-06 NOTE — PROGRESS NOTES
_                         Today's Date: 5/6/2020  Patient Name: Kirill Mcneill  Date of admission: 5/4/2020  2:36 PM  Patient's age: 77 y.o., 1945  Admission Dx: Atrial fibrillation with [facility]) [I48.91]      Requesting Physician: Jennifer Rodrigues MD    CHIEF COMPLAINT: Shortness of breath. Atrial fibrillation with rapid ventricular response. Consult for lung mass. SUBJECTIVE:  . The patient was seen and examined. Clinically stable. Heart shows regular sinus rhythm. No chest pain. No shortness of breath. No fever. No hemoptysis. BRIEF CASE HISTORY:    The patient is a 77 y.o.  male who is admitted to the hospital for further management of increasing shortness of breath secondary to A. fib with rapid ventricular response. Patient had few weeks history of weakness and fatigue. Over the last few days he was having increasing shortness of breath. No cough or hemoptysis. No fever. No chest pain. He was found to have new onset atrial fibrillation with rapid ventricular response. Patient was subsequently admitted and his heart rate has been controlled since then. The patient is a chronic tobacco abuser. He smokes 1 to 2 packs for the last 50 years. He has screening CT scan in 2018 which showed questionable findings in the left lower lung. No follow-up scan was done since then although it was ordered. On admission patient chest x-ray was abnormal.  He had CT scan of the chest which showed suspicious abnormalities with infiltrates and possible malignancy in the left lower lung. Patient denies hemoptysis. No weight loss. No headaches. He has a lump in the left supraclavicular area which he states was noted over the last 2 weeks. It is getting bigger. Past Medical History:   has a past medical history of Hyperlipidemia, Hypertension, and Kidney stones. Past Surgical History:   has no past surgical history on file.    Family History: family history includes Heart Disease in his father and mother; Hemochromatosis in his sister. Social History:   reports that he quit smoking 5 days ago. His smoking use included cigars and cigarettes. He has a 50.00 pack-year smoking history. He has never used smokeless tobacco. He reports previous alcohol use. He reports that he does not use drugs. Medications:    Prior to Admission medications    Medication Sig Start Date End Date Taking?  Authorizing Provider   amLODIPine (NORVASC) 10 MG tablet TAKE 1 TABLET DAILY 2/14/20  Yes Shasta Pereyra MD   enalapril (VASOTEC) 20 MG tablet TAKE 1 TABLET DAILY 2/14/20  Yes Shasta Pereyra MD   metoprolol succinate (TOPROL XL) 100 MG extended release tablet TAKE 1 TABLET DAILY 2/14/20  Yes Shasta Pereyra MD   hydrochlorothiazide (HYDRODIURIL) 25 MG tablet TAKE 1 TABLET DAILY 2/14/20  Yes Shasta Pereyra MD   atorvastatin (LIPITOR) 20 MG tablet TAKE 1 TABLET BY MOUTH ONE TIME A DAY 2/14/20  Yes Shasta Pereyra MD     Current Facility-Administered Medications   Medication Dose Route Frequency Provider Last Rate Last Dose    dilTIAZem (CARDIZEM CD) extended release capsule 240 mg  240 mg Oral Daily Jd [place], APRN - CNP   240 mg at 05/06/20 0836    sodium chloride flush 0.9 % injection 10 mL  10 mL Intravenous PRN Roz Guidry MD   10 mL at 05/04/20 1616    atorvastatin (LIPITOR) tablet 20 mg  20 mg Oral Nightly Patti Sinks, APRN - NP   20 mg at 05/05/20 2145    enalapril (VASOTEC) tablet 20 mg  20 mg Oral Daily Patti Sinks, APRN - NP   20 mg at 05/06/20 0836    hydroCHLOROthiazide (HYDRODIURIL) tablet 25 mg  25 mg Oral Daily Patti Sinks, APRN - NP   25 mg at 05/06/20 6193    metoprolol succinate (TOPROL XL) extended release tablet 100 mg  100 mg Oral Daily Patti Sinks, APRN - NP   100 mg at 05/06/20 3660    sodium chloride flush 0.9 % injection 10 mL  10 mL Intravenous 2 times per day Patti Sinks, APRN - NP   10 mL at clubbing or cyanosis  Skin - normal coloration and turgor, no rashes, no suspicious skin lesions noted           DATA:      Labs:       CBC:   Recent Labs     05/04/20  1504 05/04/20 2049 05/06/20  0544   WBC 12.2* 11.2  --    HGB 15.7 15.4  --    HCT 48.5 47.1  --     238 205     BMP:   Recent Labs     05/04/20  1504 05/05/20  0319    137   K 4.0 4.0   CO2 27 29   BUN 27* 33*   CREATININE 0.80 0.77   LABGLOM >60 >60   GLUCOSE 179* 122*     PT/INR:   Recent Labs     05/05/20 0319   PROTIME 15.0*   INR 1.2     APTT:  Recent Labs     05/05/20  0753 05/05/20 1940   APTT 33.4 71.6*     LIVER PROFILE:  Recent Labs     05/05/20 0319   AST 35   ALT 18   LABALBU 3.2*               IMPRESSION:    Primary Problem  New onset a-fib [facility])    Active Hospital Problems    Diagnosis Date Noted    Lymphadenopathy [R59.1]     New onset a-fib ([facility] [address] [I48.91]     Dyslipidemia [E78.5]     Lung mass [R91.8]     Class 2 severe obesity due to excess calories with serious comorbidity in adult ([facility] [address] [E66.01]     Tobacco abuse [Z72.0] 03/21/2013    Essential hypertension [I10]      New onset A. fib with rapid ventricular response, controlled. COPD  Lung mass suspicious for primary lung malignancy  Left supraclavicular lymph node enlargement. Likely metastatic disease. Chronic tobacco abuse    RECOMMENDATIONS:  1. CT scan findings were explained. Patient has very suspicious abnormalities in the lung highly suggestive of malignancy. Clinically patient is also having very large left supraclavicular lymph node which could be malignant. Patient will need tissue diagnosis. Explained the need for tissue diagnosis. Initially he stated that he will wait until the cardiac function is stabilized. Today he agreed on biopsy. He will have CT-guided needle biopsy of the left supraclavicular lymph node.     2. Further care for the new onset A. fib and rapid ventricular response as per the primary team and cardiology. 3. Patient was counseled about importance of tobacco cessation. 4. Patient's questions were answered to the best of his satisfaction and he verbalized full understanding and agreement. Discussed with patient and Nurse. Lesle Olszewski, MD                            [address] [place] [address] [facility]                          Cell: [phone]    This note is created with the assistance of a speech recognition program.  While intending to generate a document that actually reflects the content of the visit, the document can still have some errors including those of syntax and sound a like substitutions which may escape proof reading. It such instances, actual meaning can be extrapolated by contextual diversion.     Hematology oncology

## 2020-05-07 NOTE — PROGRESS NOTES
hydronephrosis with suspected partially obstructing calculi within the left renal pelvis. Clinical Course : stable  Assessment and Plan  :        1. Atrial fibrillation with rapid ventricular response  - new onset, continue Oral Cardizem. Switch to lovenox. Plan for NOAC once no procedure needed. 2. Left hilar mass -concerning for malignancy. S/p LN biopsy. Follow results. 3. Left pulmonary artery stenosis secondary to mass -   4. Suspected COVID-19 - SARS-CoV-2 PCR negative. Remove droplet isolation   5. Essential Hypertension - well controlled. 6. Pulmonary edema - lasix . 7. Chronic bronchitis - albuterol         Continue to monitor vitals , Intake / output ,  Cell count , HGB , Kidney function, oxygenation  as indicated . Plan and updates discussed with patient ,  answers  explained to satisfaction.    Plan discussed with Staff   RN     (Please note that portions of this note were completed with a voice recognition program. Efforts were made to edit the dictations but occasionally words are mis-transcribed.)      Moo Ga MD  5/7/2020

## 2020-05-07 NOTE — PLAN OF CARE
Problem: Cardiac:  Goal: Ability to maintain an adequate cardiac output will improve  Description: Ability to maintain an adequate cardiac output will improve  Outcome: Ongoing   Trace edema present. Mucous membranes moist. Tolerates fluids. Iv fluids per orders. Will continue to monitor. Problem: Falls - Risk of:  Goal: Will remain free from falls  Description: Will remain free from falls  Outcome: Ongoing  Patient is fall risk per fall scale. Falling star on door. Fall sticker on armband. Hourly rounding performed. Personal belongings and call light within reach. Bed in low position.

## 2020-05-07 NOTE — PROGRESS NOTES
Occupational Therapy  Facility/Department: [facility] PROGRESSIVE CARE  Daily Treatment Note  NAME: Lorraine Calvillo  : 1945  MRN: [id number]    Date of Service: 2020    Discharge Recommendations:  Home with assist PRN       Assessment     Written and verbal edu provided on safe ADL completion techniques and tips during bathing/showering, and toileting; EC/WS techniques of pacing, posturing, body mechanics, planning and organizing tasks, avoiding fatigue, and task simplification in regards to ADLs of eating, grooming, bathing/showering, dressing, and IADLs of cooking, meal cleanup, marketing and meal planning, laundry, bed making, and housework. Written and verbal edu provided regarding fall prevention in the areas of community safety, transportation, proper footwear and clothing, reducing risk of falls, environmental modifications, importance of exercise, consequences of falling, plan if a fall occurs (\"rest and wait\" vs \"up and about\"). OT Education: OT Role;Plan of Care;Transfer Training;ADL Adaptive Strategies; Energy Conservation;Equipment  Activity Tolerance  Activity Tolerance: Patient Tolerated treatment well  Safety Devices  Safety Devices in place: Yes  Type of devices: Call light within reach;Nurse notified;Gait belt;Left in chair         Patient Diagnosis(es): The encounter diagnosis was New onset a-fib ([address] has a past medical history of Hyperlipidemia, Hypertension, and Kidney stones. has no past surgical history on file.     Restrictions  Restrictions/Precautions  Restrictions/Precautions: General Precautions, Fall Risk  Required Braces or Orthoses?: No  Subjective   General  Chart Reviewed: Yes  Patient assessed for rehabilitation services?: Yes  Response to previous treatment: Patient with no complaints from previous session  Family / Caregiver Present: No  Oxygen Therapy  O2 Device: None (Room air)   Orientation  Orientation  Overall Orientation Status: Within Functional Limits  Objective ADL  Toileting: Supervision        Balance  Sitting Balance: Independent  Standing Balance: Supervision  Functional Mobility  Functional - Mobility Device: No device  Activity: To/from bathroom  Assist Level: Supervision  Functional Mobility Comments: Pt in bathroom upon arrival  Toilet Transfers  Toilet - Technique: Ambulating  Equipment Used: Standard toilet  Toilet Transfer: Supervision         Cognition  Overall Cognitive Status: [address]  Times per week: 2-3 visits  Specific instructions for Next Treatment: ADLs and implementing ECWS  Current Treatment Recommendations: Strengthening, Balance Training, Functional Mobility Training, Endurance Training, Equipment Evaluation, Education, & procurement, Patient/Caregiver Education & Training, Self-Care / ADL, Safety Education & Training    AM-PAC Score        AM-PAC Inpatient Daily Activity Raw Score: 21 (05/07/20 1453)  AM-PAC Inpatient ADL T-Scale Score : 44.27 (05/07/20 1453)  ADL Inpatient CMS 0-100% Score: 32.79 (05/07/20 1453)  ADL Inpatient CMS G-Code Modifier : Virginie Medicine (05/07/20 1453)    Goals  Short term goals  Time Frame for Short term goals: by discharge, pt will  Short term goal 1: demo and verb good understanding of fall prevention techs, EC/WS techs, and possible equip needs for home  Short term goal 2: demo I/MI with UB/LB ADLs with good safety and pacing, DME/AE as needed  Patient Goals   Patient goals : to go home       Therapy Time   Individual Concurrent Group Co-treatment   Time In 1425         Time Out 1437         Minutes 12              Upon writer exit, call light within reach, pt retired to chair. All lines intact and patient positioned comfortably. All patient needs addressed prior to ending therapy session. Chart reviewed prior to treatment and patient is agreeable for therapy. RN reports patient is medically stable for therapy treatment this date.     LATESHA Dunn

## 2020-05-07 NOTE — CARE COORDINATION
nHpredict Inpatient Visit    Patient/family seen: No: writer offsite     Provided patient/family with copy of nHpredict tool: No: writer offsite. Writer informed OSVALDO Muro of predict tool consideration for home care      All questions answered at the time of visit. Informed patient/family that the nHpredict is a tool, to be used as a guide, and should not alter their currently established discharge plan. Current discharge plan: home independently vs home care     Collaboration completed with case management: Yes- Bhaskar (she informs DC needs will be followed up on closer to DC)       Please find attached the nHpredict outcome report for Yessica Connor  47. The outcome report recommends Home with C. Pt. ambulated 160 SBA and requires SBA with bathing/dressing. Consider pt. discharging home with spouse and home health. Therapy evals would be beneficial to make sure the home is safe.    Thank you,         Virgina Holstein RN  Centralized Care Coordinator  M: [phone]  F: [phone]

## 2020-05-07 NOTE — PROGRESS NOTES
Daily Webster Clapper, APRN - NP   100 mg at 05/07/20 0840    sodium chloride flush 0.9 % injection 10 mL  10 mL Intravenous 2 times per day Webster Clapper, APRN - NP   10 mL at 05/06/20 0836    sodium chloride flush 0.9 % injection 10 mL  10 mL Intravenous PRN Jennifer Clapper, APRN - NP        acetaminophen (TYLENOL) tablet 650 mg  650 mg Oral Q6H PRN Jennifer Clapper, APRN - NP        Or   [facility] acetaminophen (TYLENOL) suppository 650 mg  650 mg Rectal Q6H PRN Webster Clapper, APRN - NP        polyethylene glycol (GLYCOLAX) packet 17 g  17 g Oral Daily PRN Webster Clapper, APRN - NP        promethazine (PHENERGAN) tablet 12.5 mg  12.5 mg Oral Q6H PRN Webster Clapper, APRN - NP        Or    ondansetron [facility]) injection 4 mg  4 mg Intravenous Q6H PRN Webster Clapper, APRN - NP        nicotine (NICODERM CQ) 21 MG/24HR 1 patch  1 patch Transdermal Daily PRN Webster Clapper, APRN - NP        aspirin chewable tablet 81 mg  81 mg Oral Daily Webster Clapper, APRN - NP   81 mg at 05/07/20 3185       Allergies:  Patient has no known allergies. REVIEW OF SYSTEMS:      · General: Positive for weakness and fatigue. . No unanticipated weight loss or decreased appetite. No fever or chills. · Eyes: No blurred vision, eye pain or double vision. · Ears: No hearing problems or drainage. No tinnitus. · Throat: No sore throat, problems with swallowing or dysphagia. · Respiratory: As above. · Cardiovascular: No chest pain, orthopnea or PND. No lower extremity edema. No palpitation. · Gastrointestinal: No problems with swallowing. No abdominal pain or bloating. No nausea or vomiting. No diarrhea or constipation. No GI bleeding. · Genitourinary: No dysuria, hematuria, frequency or urgency. · Musculoskeletal: No muscle aches or pains. No limitation of movement. No back pain. No gait disturbance, No joint complaints. · Dermatologic: No skin rashes or pruritus. No skin lesions or discolorations.    · Psychiatric: No depression, anxiety, or stress or signs of schizophrenia. No change in mood or affect. · Hematologic: No history of bleeding tendency. No bruises or ecchymosis. No history of clotting problems. · Infectious disease: No fever, chills or frequent infections. · Endocrine: No polydipsia or polyuria. No temperature intolerance. · Neurologic: No headaches or dizziness. No weakness or numbness of the extremities. No changes in balance, coordination,  memory, mentation, behavior. · Allergic/Immunologic: No nasal congestion or hives. No repeated infections. PHYSICAL EXAM:      BP (!) 131/55   Pulse 66   Temp 97.5 °F (36.4 °C) (Oral)   Resp 16   Ht 6' 2\" (1.88 m)   Wt 281 lb 11.2 oz (127.8 kg)   SpO2 93%   BMI 36.17 kg/m²    Temp (24hrs), Av.6 °F (36.4 °C), Min:97.5 °F (36.4 °C), Max:97.9 °F (36.6 °C)      General appearance - not in pain or distress  Mental status - alert and oriented  Eyes - pupils equal and reactive, extraocular eye movements intact  Ears - bilateral TM's and external ear canals normal  Nose - normal and patent, no erythema, discharge or polyps  Mouth - mucous membranes moist, pharynx normal without lesions  Neck - supple, no significant adenopathy  Lymphatics -enlarged 1 inch left supraclavicular lymph node. No other palpable lymph nodes.     Chest - clear to auscultation, no wheezes, rales or rhonchi, symmetric air entry  Heart - normal rate, regular rhythm, normal S1, S2, no murmurs, rubs, clicks or gallops  Abdomen - soft, nontender, nondistended, no masses or organomegaly  Neurological - alert, oriented, normal speech, no focal findings or movement disorder noted  Musculoskeletal - no joint tenderness, deformity or swelling  Extremities - peripheral pulses normal, no pedal edema, no clubbing or cyanosis  Skin - normal coloration and turgor, no rashes, no suspicious skin lesions noted           DATA:      Labs:       CBC:   Recent Labs     20  0544   WBC

## 2020-05-07 NOTE — PROGRESS NOTES
others  · Possible pulmonary embolus versus tumor infiltration-patient is on heparin drip, managed by others    PLAN:  1. Continue current cardiac medications. 2. Pt remains on Lovenox at this time, will start Eliquis when he no longer needs procedures that increase the risk of bleeding. Discussed with patient and nursing.     Juanito Bagley MD

## 2020-05-08 NOTE — PROGRESS NOTES
_                         Today's Date: 5/8/2020  Patient Name: Niurka Marks  Date of admission: 5/4/2020  2:36 PM  Patient's age: 77 y.o., 1945  Admission Dx: Atrial fibrillation with RVR [facility]) [I48.91]      Requesting Physician: Pedro Barrientos MD    CHIEF COMPLAINT: Shortness of breath. Atrial fibrillation with rapid ventricular response. Consult for lung mass. SUBJECTIVE:  . The patient was seen and examined. Clinically stable. Heart shows regular sinus rhythm. No chest pain. No shortness of breath. No fever. No hemoptysis. Patient had ultrasound-guided biopsy of left supraclavicular lymph node. No complications. BRIEF CASE HISTORY:    The patient is a 77 y.o.  male who is admitted to the hospital for further management of increasing shortness of breath secondary to A. fib with rapid ventricular response. Patient had few weeks history of weakness and fatigue. Over the last few days he was having increasing shortness of breath. No cough or hemoptysis. No fever. No chest pain. He was found to have new onset atrial fibrillation with rapid ventricular response. Patient was subsequently admitted and his heart rate has been controlled since then. The patient is a chronic tobacco abuser. He smokes 1 to 2 packs for the last 50 years. He has screening CT scan in 2018 which showed questionable findings in the left lower lung. No follow-up scan was done since then although it was ordered. On admission patient chest x-ray was abnormal.  He had CT scan of the chest which showed suspicious abnormalities with infiltrates and possible malignancy in the left lower lung. Patient denies hemoptysis. No weight loss. No headaches. He has a lump in the left supraclavicular area which he states was noted over the last 2 weeks. It is getting bigger.     Past Medical History:   has a past medical history of Hyperlipidemia, Hypertension, and Kidney stones. Past Surgical History:   has no past surgical history on file. Family History: family history includes Heart Disease in his father and mother; Hemochromatosis in his sister. Social History:   reports that he quit smoking 7 days ago. His smoking use included cigars and cigarettes. He has a 50.00 pack-year smoking history. He has never used smokeless tobacco. He reports previous alcohol use. He reports that he does not use drugs. Medications:    Prior to Admission medications    Medication Sig Start Date End Date Taking? Authorizing Provider   dilTIAZem (CARDIZEM CD) 240 MG extended release capsule Take 1 capsule by mouth daily 5/9/20  Yes King Aaron MD   furosemide (LASIX) 20 MG tablet Take 1 tablet by mouth daily 5/9/20  Yes King Aaron MD   Elastic Bandages & Supports (JOBST KNEE HIGH COMPRESSION SM) MISC 1 each by Does not apply route daily as needed (lower ext edema) 5/8/20  Yes King Aaron MD   apixaban (ELIQUIS) 5 MG TABS tablet Take 1 tablet by mouth 2 times daily 5/8/20 6/7/20 Yes King Aaron MD   enalapril (VASOTEC) 20 MG tablet TAKE 1 TABLET DAILY 2/14/20  Yes Lianne Swift MD   metoprolol succinate (TOPROL XL) 100 MG extended release tablet TAKE 1 TABLET DAILY 2/14/20  Yes Lianne Swift MD   atorvastatin (LIPITOR) 20 MG tablet TAKE 1 TABLET BY MOUTH ONE TIME A DAY 2/14/20  Yes Lianne Swift MD     No current facility-administered medications for this encounter.       Current Outpatient Medications   Medication Sig Dispense Refill    [START ON 5/9/2020] dilTIAZem (CARDIZEM CD) 240 MG extended release capsule Take 1 capsule by mouth daily 30 capsule 3    [START ON 5/9/2020] furosemide (LASIX) 20 MG tablet Take 1 tablet by mouth daily 30 tablet 0    Elastic Bandages & Supports (JOBST KNEE HIGH COMPRESSION SM) MISC 1 each by Does not apply route daily as needed (lower ext edema) 1 each 0    apixaban (ELIQUIS) 5 MG TABS tablet Take 1 tablet by mouth 2 times daily abuse [Z72.0] 03/21/2013    Essential hypertension [I10]      New onset A. fib with rapid ventricular response, controlled. COPD  Lung mass suspicious for primary lung malignancy  Left supraclavicular lymph node enlargement. Likely metastatic disease. Chronic tobacco abuse    RECOMMENDATIONS:  1. CT scan findings were explained. Patient has very suspicious abnormalities in the lung highly suggestive of malignancy. Clinically patient is also having very large left supraclavicular lymph node which could be malignant. Patient will need tissue diagnosis. Explained the need for tissue diagnosis. Initially he stated that he will wait until the cardiac function is stabilized. 2. Status post ultrasound-guided biopsy of left supraclavicular lymph node. We will wait for pathology results. 3. Patient may be discharged today. He will be seen in the office early next week. 4. Further care for the new onset A. fib and rapid ventricular response as per the primary team and cardiology. 5. Patient was counseled about importance of tobacco cessation. 6. Patient's questions were answered to the best of his satisfaction and he verbalized full understanding and agreement. Discussed with patient and Nurse. Buddy Colon MD                            [address] [place] [address] [facility]                          Cell: [phone]    This note is created with the assistance of a speech recognition program.  While intending to generate a document that actually reflects the content of the visit, the document can still have some errors including those of syntax and sound a like substitutions which may escape proof reading. It such instances, actual meaning can be extrapolated by contextual diversion.     Hematology oncology

## 2020-05-08 NOTE — DISCHARGE SUMMARY
[facility] [place]      Discharge Summary     Patient ID: Leidy Wild  :  [id number]   MRN: [id number]     ACCOUNT:  [de-identified]   Patient Location :   Patient's PCP: Juwan Fontaine MD  Admit Date: 2020   Discharge Date: 2020     Length of Stay: 4  Code Status:  Prior  Admitting Physician: Xavier Nicholas MD  Discharge Physician: Josias Albert MD     Active Discharge Diagnosis :     Primary Problem  New onset a-fib [facility])      [facility]    Diagnosis Date Noted    Lymphadenopathy [R59.1]     [facility] ([facility] [I48.91]     Dyslipidemia [E78.5]     Mass of thoracic structure [R22.2]     Class 2 severe obesity due to excess calories with serious comorbidity in adult ([facility] [E66.01]     Tobacco abuse [Z72.0] 2013    Essential hypertension [I10]        Admission Condition:  fair     Discharged Condition: stable    Hospital Stay:     Hospital Course:  Leidy Wild is a 77 y.o. male who was admitted for the management of   New onset a-fib ([facility] , presented to ER with Shortness of Breath (onset 2 weeks, seen PCP today); Shoulder Pain (left); and Mass (left neck)  Patient came to the hospital shortness of breath which was progressively worsening for the last 2 weeks associated with nonproductive cough, palpitations. Patient also reported left-sided swelling and neck which has been painful intermittently. Patient was evaluated by PCP and was found to have irregular rhythm with increased heart rate. He was sent for further evaluation. Patient had outpatient nasopharyngeal swab for SARS-CoV-2 which was negative. He has underlying history of hypertension, dyslipidemia. He is former smoker and quit about 3 years before. Evaluation emergency room showed A. fib with RVR. Patient was given Cardizem bolus and started on Cardizem infusion and heparin for anticoagulation.   Further imaging of the chest showed masslike opacity in left mmol/L 3.6(L) 4.0 4.0 4.8 3.8   Na 135 - 144 mmol/L 140 137 139 142 146(H)     Lab Results   Component Value Date    ALT 18 05/05/2020    AST 35 05/05/2020    ALKPHOS 66 05/05/2020    BILITOT 0.48 05/05/2020     Lab Results   Component Value Date    TSH 0.87 05/05/2020     Lab Results   Component Value Date    HEPCAB NONREACTIVE 05/08/2017     No results found for: VOL, APPEARANCE, COLORU, LABSPEC, LABPH, LEUKBLD, NITRU, GLUCOSEU, KETUA, UROBILINOGEN, KETUA, UROBILINOGEN, BILIRUBINUR, OCBU  No results found for: LABA1C  No results found for: EAG  Lab Results   Component Value Date    INR 1.2 05/05/2020    PROTIME 15.0 (H) 05/05/2020       Xr Chest Portable    Result Date: 5/4/2020  1. Masslike opacity at the left hilum with airspace consolidation in the mid and lower left lung. This is concerning for left hilar malignancy with postobstructive pneumonia or atelectasis. Contrast-enhanced CT chest is recommended. 2.  Thickened right paratracheal stripe, suggesting lymphadenopathy. Ct Chest Pulmonary Embolism W Contrast    Result Date: 5/4/2020  No acute central or segmental pulmonary embolus. Questionable left lower lobe chronic nonocclusive pulmonary embolus versus tumor infiltration. Left main pulmonary artery is surrounded by tumor which causes narrowing. Massive mediastinal and left hilar adenopathy. Multiple conglomerate left lower lobe masses. Mild left-sided hydronephrosis with suspected partially obstructing calculi within the left renal pelvis. Ir Biopsy Lymph Node Superficial    Result Date: 5/6/2020  Successful ultrasound guided core biopsy enlarged left supraclavicular lymph node. Echo 5/4/20  Summary  Technically difficult study. Limited visualization. Due to technically difficult visualization no measurements were made. Left ventricle size appears normal.  Global left ventricular systolic function is normal with an estimated  ejection fraction of 60 % .   No obvious wall motion

## 2020-05-11 PROBLEM — C34.90 SMALL CELL LUNG CANCER (HCC): Status: ACTIVE | Noted: 2020-01-01

## 2020-05-11 PROBLEM — C77.1 METASTASIS TO MEDIASTINAL LYMPH NODE (HCC): Status: ACTIVE | Noted: 2020-01-01

## 2020-05-11 PROBLEM — C77.0 METASTASIS TO SUPRACLAVICULAR LYMPH NODE (HCC): Status: ACTIVE | Noted: 2020-01-01

## 2020-05-11 NOTE — PATIENT INSTRUCTIONS
Please see orders for chemotherapy  The patient will need a Mediport as soon as possible  Please get chemotherapy precertified as soon as possible, I intend to start chemotherapy once approved. The patient will need a PET CT scan and brain MRI, also I want them done as soon as possible. But it is okay to start chemotherapy before the scans if unable to get him in time. Patient is to come in to start chemotherapy once approved.   I could see him if that happens next week but otherwise wait until Dr. Franci Ochoa is back

## 2020-05-11 NOTE — TELEPHONE ENCOUNTER
Tory Mccallum MD VISIT  DR Blayne Burgos IN TO SEE PATIENT  ORDERS RECEIVED  PET/CT SCAN 5/14/20 @ 10:45 AM [facility]  [facility] COVID-19 TEST 5/16/20 @ 7:30AM PORT PLACEMENT 5/19/20 /@ 1PM ARRIVE @ 11:30AM [facility]  MRI BRAIN W & W/O CONTRAST 5/20/20 @ 7:30AM [facility] @ 7:15 [facility]  LABS CDP CMP AMYLASE LIPASE TSH CORTISOL TOTAL W/ EACH TX  NEW CHEMO PENDING PRECERT  MD VISIT W/ C1 D1   AVS PRINTED AND GIVEN TO PATIENT W/ INSTRUCTIONS  PATIENT DISCHARGED VIA WHEELCHAIR

## 2020-05-11 NOTE — PROGRESS NOTES
extraocular eye movements intact  Ears - bilateral TM's and external ear canals normal  Nose - normal and patent, no erythema, discharge or polyps  Mouth - mucous membranes moist, pharynx normal without lesions  Neck - supple, no significant adenopathy  Lymphatics -enlarged 1 inch left supraclavicular lymph node. No other palpable lymph nodes. Chest - clear to auscultation, no wheezes, rales or rhonchi, symmetric air entry  Heart - normal rate, regular rhythm, normal S1, S2, no murmurs, rubs, clicks or gallops  Abdomen - soft, nontender, nondistended, no masses or organomegaly  Neurological - alert, oriented, normal speech, no focal findings or movement disorder noted  Musculoskeletal - no joint tenderness, deformity or swelling  Extremities - peripheral pulses normal, no pedal edema, no clubbing or cyanosis  Skin - normal coloration and turgor, no rashes, no suspicious skin lesions noted           DATA:    CT scan 5/4/2020     Impression   No acute central or segmental pulmonary embolus.       Questionable left lower lobe chronic nonocclusive pulmonary embolus versus   tumor infiltration.  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       Massive mediastinal and left hilar adenopathy.  Multiple conglomerate left   lower lobe masses.       Mild left-sided hydronephrosis with suspected partially obstructing calculi   within the left renal pelvis. Labs:   Pathology from cervical node 5/6/2020  -- Diagnosis --   LYMPH NODE, LEFT SUPRACLAVICULAR, ULTRASOUND-GUIDED CORE NEEDLE   BIOPSIES:             -  METASTATIC SMALL CELL CARCINOMA. IMPRESSION:    1. New onset A. fib with rapid ventricular response, controlled. 2. COPD  3. Lung mass suspicious for primary lung malignancy  4. Left supraclavicular lymph node enlargement. Likely metastatic disease. 5. Chronic tobacco abuse  6. Biopsy showed small cell carcinoma. 7. Staging in progress  RECOMMENDATIONS:  1.  We reviewed his pathology which shows small cell lung cancer and I explained diagnosis. Based on what is seen on the CT scan, I suspect that he has an extensive disease. But we will confirm that with PET CT scan and brain MRI. 2. He is becoming symptomatic very quickly, I think there is urgency to start treatment in the next few days. 3. I confirmed that his hemoptysis and loss of voice are disease related. Likely due to involvement of recurrent laryngeal nerve  4. I explained plan for staging studies and chemo recommendation. Likely to start him on carboplatin and etoposide plus immunotherapy. With the remote possibility of adding radiation if no metastatic disease appreciated. 5. I discussed prognostic data and natural history of the disease. 6. He elected to proceed with treatment. 7. I am ordering brain MRI and PET for staging. 8. I am writing for chemo and port insertion. 9. Return to commence with red once approved      Alena Levi MD                                This note is created with the assistance of a speech recognition program.  While intending to generate a document that actually reflects the content of the visit, the document can still have some errors including those of syntax and sound a like substitutions which may escape proof reading. It such instances, actual meaning can be extrapolated by contextual diversion.     Hematology oncology

## 2020-05-12 NOTE — TELEPHONE ENCOUNTER
New chemotherapy order    Ht 188 cm  Wt 131 kg  BSA 2.62    21 day cycle x 4 cycles    5/6/20 lymph node biopsy showing metastatic small cell carcinoma    Tecentriq 1200 mg iv (flat dose)    Carboplatin AUC 5    Etoposide 100mg/m2 equals 645 mg    precert filled out  Sent to pool for second check

## 2020-05-15 NOTE — PROGRESS NOTES
noted.  Lips/Teeth/Gums all appear normal.  Neck: Normal range of motion. Neck supple. No tracheal deviation present. No abnormal lymphadenopathy. No JVD noted. Carotids are clear bilaterally. No thyroid masses noted. Heart: RRR . No S3, S4, or gallop noted. Chest: Clear to auscultation bilaterally. Good breath sounds noted. No rales, wheezes, or rhonchi noted. No respiratory retractions noted. Wall has symmetrical movement with respirations. Assessment:   Encounter Diagnoses   Name Primary?  Paroxysmal A-fib (HCC) Yes    Small cell lung cancer ([facility]     Cough          Plan:   There are no discontinued medications. THE ABOVE NOTED DISCONTINUED MEDS MAY ONLY BE FROM 'CLEANING UP' THE MED LIST AND WERE NOT ACTUALLY CANCELED;  SEE CHART FOR DETAILS! Orders Placed This Encounter   Medications    benzonatate (TESSALON) 200 MG capsule     Sig: Take 1 capsule by mouth 3 times daily as needed for Cough     Dispense:  21 capsule     Refill:  0     No orders of the defined types were placed in this encounter. No follow-ups on file.   Patient Instructions   Use boost after each meal    Data Unavailable      Oncology ordered labs, but family didn't know when they are to do these, they should call today to find out

## 2020-05-19 PROBLEM — I48.91 ATRIAL FIBRILLATION WITH RVR (HCC): Status: ACTIVE | Noted: 2020-01-01

## 2020-05-19 NOTE — POST SEDATION
Sedation Post Procedure Note    Patient Name: Niurka Marks   YOB: 1945  Room/Bed: Room/bed info not found  Medical Record Number: [id number]  Date: 5/19/2020   Time: 1:57 PM         Physicians/Assistants: Marc Harris MD    Procedure Performed:  Port placement    Post-Sedation Vital Signs:  Vitals:    05/19/20 1351   BP: 115/70   Pulse: 114   Resp: 15   SpO2: 98%      Vital signs were reviewed and were stable after the procedure (see flow sheet for vitals)            Complications: none    Electronically signed by Marc Harris MD on 5/19/2020 at 1:57 PM

## 2020-05-19 NOTE — H&P
[address]      HISTORY AND PHYSICAL EXAMINATION            Date:   5/19/2020  Patient name:  Rhiannon Quintana  Date of admission:  5/19/2020  3:27 PM  MRN:   [id number]  Account:  [de-identified]  YOB: 1945  PCP:    Simran Santos MD  Room:   2034/2034-01  Code Status:    Full Code    Chief Complaint:     Chief Complaint   Patient presents with    Arm Swelling       History Obtained From:     patient, electronic medical record    History of Present Illness: The patient is a 77 y.o. Non-/non  male who presents with Arm Swelling   and he is admitted to the hospital for the management of  Atrial fibrillation with RVR ([facility] [address] Patient was in IR for PORT placement and was found to have Left upper ext swelling . Doppler showed left subclavian DVT . patient was also found to have tachycadia and was sent to ED. Patient had tachycardia with Afib RVR . He is feeling tired but denies any dyspnea. Patient was given cardizem bolus and started on cardizem infusion. Evaluation showed leucocytosis . 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  Authorizing Provider   benzonatate (TESSALON) 200 MG capsule Take 1 capsule by mouth 3 times daily as needed for Cough 5/15/20 Anthony Turner MD   omeprazole (PRILOSEC) 20 MG delayed release capsule Take 1 capsule by mouth daily 5/11/20   Vik Otero MD   ondansetron (ZOFRAN ODT) 8 MG TBDP disintegrating tablet Place 1 tablet under the tongue every 8 hours as needed for Nausea or Vomiting 5/11/20   Omer Okeefe MD   lidocaine-prilocaine (EMLA) 2.5-2.5 % cream Apply topically as needed. 5/11/20   Anoop Otero MD   dilTIAZem (CARDIZEM CD) 240 MG extended release capsule Take 1 capsule by mouth daily 5/9/20   Lydia Shipley MD   furosemide (LASIX) 20 MG tablet Take 1 tablet by mouth daily 5/9/20   Lydia Shipley MD   Elastic Bandages & Supports (JOBST KNEE HIGH COMPRESSION SM) MISC 1 each by Does not apply route daily as needed (lower ext edema) 5/8/20   Lydia Shipley MD   apixaban (ELIQUIS) 5 MG TABS tablet Take 1 tablet by mouth 2 times daily 5/8/20 6/7/20  Lydia Shipley MD   enalapril (VASOTEC) 20 MG tablet TAKE 1 TABLET DAILY 2/14/20   Anthony Turner MD   metoprolol succinate (TOPROL XL) 100 MG extended release tablet TAKE 1 TABLET DAILY 2/14/20   Anthony Turner MD   atorvastatin (LIPITOR) 20 MG tablet TAKE 1 TABLET BY MOUTH ONE TIME A DAY 2/14/20   Anthony Turner MD        Allergies:     Patient has no known allergies. Social History:     Tobacco:    reports that he quit smoking about 2 weeks ago. His smoking use included cigars and cigarettes. He has a 50.00 pack-year smoking history. He has never used smokeless tobacco.  Alcohol:      reports previous alcohol use. Drug Use:  reports no history of drug use. Family History:     Family History   Problem Relation Age of Onset    Hemochromatosis Sister     Heart Disease Mother     Heart Disease Father        Review of Systems:     Positive and Negative as described in HPI. Review of Systems   Constitutional: Positive for fatigue. Negative for activity change, appetite change, fever and unexpected weight change.    HENT: Negative for congestion, Pulses:           Dorsalis pedis pulses are 2+ on the right side and 2+ on the left side. Heart sounds: Normal heart sounds. No murmur. Pulmonary:      Effort: Pulmonary effort is normal.      Breath sounds: Normal breath sounds. No wheezing or rales. Chest:       Abdominal:      Palpations: Abdomen is soft. There is no mass. Tenderness: There is no abdominal tenderness. Lymphadenopathy:      Head:      Right side of head: No submandibular adenopathy. Left side of head: No submandibular adenopathy. Cervical: No cervical adenopathy. Skin:     General: Skin is warm. Neurological:      Mental Status: He is alert and oriented to person, place, and time. Motor: No tremor. Psychiatric:         Behavior: Behavior is cooperative.          Investigations:      Laboratory Testing:  Recent Results (from the past 24 hour(s))   APTT    Collection Time: 05/19/20 11:45 AM   Result Value Ref Range    PTT 32.6 23.9 - 33.8 sec   Platelet count    Collection Time: 05/19/20 11:45 AM   Result Value Ref Range    Platelets 994 670 - 822 k/uL   Protime-INR    Collection Time: 05/19/20 11:45 AM   Result Value Ref Range    Protime 16.5 (H) 11.5 - 14.2 sec    INR 1.4    CBC Auto Differential    Collection Time: 05/19/20  3:57 PM   Result Value Ref Range    WBC 14.6 (H) 3.5 - 11.3 k/uL    RBC 4.40 4.21 - 5.77 m/uL    Hemoglobin 13.0 13.0 - 17.0 g/dL    Hematocrit 41.7 40.7 - 50.3 %    MCV 94.8 82.6 - 102.9 fL    MCH 29.5 25.2 - 33.5 pg    MCHC 31.2 28.4 - 34.8 g/dL    RDW 14.6 (H) 11.8 - 14.4 %    Platelets 802 395 - 849 k/uL    MPV 11.6 8.1 - 13.5 fL    NRBC Automated 0.0 0.0 per 100 WBC    Differential Type NOT REPORTED     WBC Morphology NOT REPORTED     RBC Morphology NOT REPORTED     Platelet Estimate NOT REPORTED     Seg Neutrophils 89 (H) 36 - 66 %    Lymphocytes 8 (L) 24 - 44 %    Monocytes 3 1 - 7 %    Eosinophils % 0 (L) 1 - 4 %    Basophils 0 %    Immature Granulocytes 0 0 %    Segs Absolute 12.99 (H) 1.8 - 7.7 k/uL    Absolute Lymph # 1.17 1.0 - 4.8 k/uL    Absolute Mono # 0.44 0.2 - 0.8 k/uL    Absolute Eos # 0.00 0.0 - 0.4 k/uL    Basophils Absolute 0.00 0.0 - 0.2 k/uL    Absolute Immature Granulocyte 0.00 0.00 - 0.30 k/uL   Comprehensive Metabolic Panel w/ Reflex to MG    Collection Time: 05/19/20  3:57 PM   Result Value Ref Range    Glucose 165 (H) 70 - 99 mg/dL    BUN 30 (H) 8 - 23 mg/dL    CREATININE 0.77 0.70 - 1.20 mg/dL    Bun/Cre Ratio 39 (H) 9 - 20    Calcium 9.6 8.6 - 10.4 mg/dL    Sodium 141 135 - 144 mmol/L    Potassium 4.1 3.7 - 5.3 mmol/L    Chloride 99 98 - 107 mmol/L    CO2 26 20 - 31 mmol/L    Anion Gap 16 9 - 17 mmol/L    Alkaline Phosphatase 63 40 - 129 U/L    ALT 19 5 - 41 U/L    AST 49 (H) <40 U/L    Total Bilirubin 0.65 0.3 - 1.2 mg/dL    Total Protein 6.6 6.4 - 8.3 g/dL    Alb 3.1 (L) 3.5 - 5.2 g/dL    Albumin/Globulin Ratio NOT REPORTED 1.0 - 2.5    GFR Non-African American >60 >60 mL/min    GFR African American >60 >60 mL/min    GFR Comment          GFR Staging NOT REPORTED    APTT    Collection Time: 05/19/20  3:57 PM   Result Value Ref Range    PTT 31.9 23.9 - 33.8 sec   Protime-INR    Collection Time: 05/19/20  3:57 PM   Result Value Ref Range    Protime 17.2 (H) 11.5 - 14.2 sec    INR 1.4        Imaging/Diagonstics:    Xr Chest Portable    Result Date: 5/19/2020  Progressive opacification of the left hemithorax. Port catheter tip in the SVC with no pneumothorax. Ir Port Placement > 5 Years    Result Date: 5/19/2020  Successful ultrasound and fluoroscopy guided right internal jugular vein 8 Macedonian power injectable Port-A-Cath placement. Ready for use. Pet Ct Skull Base To Mid Thigh    Result Date: 5/14/2020  1. Widespread markedly metabolically active neoplastic disease involving the left supraclavicular lymph node, conglomerate mediastinal lymph nodes and left pulmonary opacities.   Multifocal metabolic abnormalities throughout the skeleton without defined lytic or blastic lesions. 2.  Incidental findings include loculated pleural fluid in the superior aspect of the left major fissure. Cholelithiasis and bilateral nephrolithiasis. 6 mm stone is present in the left UPJ without evidence for complete obstruction. 3.  Focal areas of metabolic activity in the maxilla and mandible favored to be related to dental disease. 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 Left Upper Ext DVT - secondary to compression from the mass - needs anticoagulation , was on eliquis. May need to change anticoagulation , Lovenox 1mg/kg BID for now. 2. Afib RVR - cardizem infusion. 3. Malignant left lung Ca - s/p PORT placement - Oncology consultation ,   4. Class 2 obesity -   5. Essential Hypertension - controlled   6. Tobacco abuse     Consultations:   IP CONSULT TO INTERNAL MEDICINE  IP CONSULT TO CARDIOLOGY  IP CONSULT TO VASCULAR SURGERY  IP CONSULT TO HEM/ONC    Patient is admitted as inpatient status because of co-morbidities listed above, severity of signs and symptoms as outlined, requirement for current medical therapies and most importantly because of direct risk to patient if care not provided in a hospital setting.     Sami Thomas MD  5/19/2020    Copy sent to Dr. Rober Oliveira MD    (Please note that portions of this note were completed with a voice recognition program. Efforts were made to edit the dictations but occasionally words are

## 2020-05-19 NOTE — PROGRESS NOTES
Dr Pili Chang notified of doppler report , orders obtained to sent pt to ED. pts wife gloria also called and updated to plan of care

## 2020-05-19 NOTE — ED TRIAGE NOTES
Pt to ED from port procedure today. Pt c/o sob and L arm swelling. Pt AOx4. H/o of lung ca, untreated. Possible mass to L upper chest area. Patent airway, no dyspnea or cyanosis noted. IV access to R hand. Skin warm, appropriate to hue and dry.

## 2020-05-19 NOTE — ED PROVIDER NOTES
EMERGENCY DEPARTMENT ENCOUNTER    Pt Name: Vinh Diego  MRN: [id number]  [place] 1945  Date of evaluation: 5/19/20  CHIEF COMPLAINT       Chief Complaint   Patient presents with    Arm Swelling     HISTORY OF PRESENT ILLNESS   Patient is a 36-year-old male with PMH of multiple comorbidities occluding lung cancer, atrial fibrillation on Eliquis, COPD hypertension and hyperlipidemia who arrives from IR suite today after DVT was diagnosed in left arm. Patient complains of left neck mass, left arm swelling and shortness of breath for approximately 3 to 4 weeks. Today he had port placed in right chest wall and had ultrasound study that discovered the DVT in left arm. He last took Eliquis 46 hours ago, likely held for port placement today. He does not not have fever, chest pain, abdominal pain, nausea, vomiting, change in urine or stool. REVIEW OF SYSTEMS     Review of Systems   All other systems reviewed and are negative.     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)     Supraclavicular lymphadenopathy      SURGICAL HISTORY       Past Surgical History:   Procedure Laterality Date    TUNNELED VENOUS PORT PLACEMENT  05/19/2020    IR PORT PLACEMENT EQUAL OR GREATER THAN 5 YEARS     CURRENT MEDICATIONS       Current Discharge Medication List      CONTINUE these medications which have NOT CHANGED    Details   benzonatate (TESSALON) 200 MG capsule Take 1 capsule by mouth 3 times daily as needed for Cough  Qty: 21 capsule, Refills: 0      omeprazole (PRILOSEC) 20 MG delayed release capsule Take 1 capsule by mouth daily  Qty: 30 capsule, Refills: 3      ondansetron (ZOFRAN ODT) 8 MG TBDP disintegrating tablet Place 1 tablet under the tongue every 8 hours as needed for Nausea or Vomiting  Qty: 30 tablet, Refills: 3      lidocaine-prilocaine (EMLA) 2.5-2.5 % cream Apply topically as needed. Qty: 1 Tube, Refills: 1      dilTIAZem (CARDIZEM CD) 240 MG extended release capsule Take 1 capsule by mouth daily  Qty: 30 capsule, Refills: 3      furosemide (LASIX) 20 MG tablet Take 1 tablet by mouth daily  Qty: 30 tablet, Refills: 0      Elastic Bandages & Supports (JOBST KNEE HIGH COMPRESSION SM) MISC 1 each by Does not apply route daily as needed (lower ext edema)  Qty: 1 each, Refills: 0      apixaban (ELIQUIS) 5 MG TABS tablet Take 1 tablet by mouth 2 times daily  Qty: 60 tablet, Refills: 0      enalapril (VASOTEC) 20 MG tablet TAKE 1 TABLET DAILY  Qty: 90 tablet, Refills: 4      metoprolol succinate (TOPROL XL) 100 MG extended release tablet TAKE 1 TABLET DAILY  Qty: 90 tablet, Refills: 3      atorvastatin (LIPITOR) 20 MG tablet TAKE 1 TABLET BY MOUTH ONE TIME A DAY  Qty: 90 tablet, Refills: 3           ALLERGIES     has No Known Allergies. FAMILY HISTORY     He indicated that his mother is . He indicated that his father is . He indicated that the status of his sister is unknown. SOCIAL HISTORY       Social History     Tobacco Use    Smoking status: Former Smoker     Packs/day: 1.00     Years: 50.00     Pack years: 50.00     Types: Cigars, Cigarettes     Last attempt to quit: 2020     Years since quittin.0    Smokeless tobacco: Never Used   Substance Use Topics    Alcohol use: Not Currently     Comment: rare    Drug use: No     PHYSICAL EXAM     INITIAL VITALS: /69   Pulse 100   Temp 97 °F (36.1 °C) (Temporal)   Resp 18   Ht 6' 2\" (1.88 m)   Wt 286 lb 8 oz (130 kg)   SpO2 95%   BMI 36.78 kg/m²    Physical Exam  HENT:      Head: Normocephalic.       Right Ear: External ear normal.      Left Ear: External ear normal.      Nose: Nose normal.   Eyes:      Conjunctiva/sclera: Conjunctivae normal.   Cardiovascular:      Rate and Rhythm: Normal rate. Rhythm irregular. Abdominal:      General: Abdomen is flat. Skin:     General: Skin is dry. Comments: Diffuse swelling left upper extremity. Left neck mass   Neurological:      Mental Status: He is alert. Mental status is at baseline. Psychiatric:         Mood and Affect: Mood normal.         Behavior: Behavior normal.         MEDICAL DECISION MAKING:   The patient is hypoxic, afebrile, tachycardic  Physical exam notable for  Based on history and exam  ED plan for    ED Course as of May 22 0716   Tue May 19, 2020   1626 I discussed case with hospitalist team, Zoya, who accepts patient for admission to hospital.    [VINNIE]      ED Course User Index  [VINNIE] Geovanni Guerrero MD       CRITICAL CARE:       PROCEDURES:    Procedures    DIAGNOSTIC RESULTS   EKG:All EKG's are interpreted by the Emergency Department Physician who either signs or Co-signs this chart in the absence of a cardiologist.        RADIOLOGY:All plain film, CT, MRI, and formal ultrasound images (except ED bedside ultrasound) are read by the radiologist, see reports below, unless otherwisenoted in MDM or here. XR CHEST PORTABLE   Final Result   No significant change in the near complete opacification of the left   hemithorax reflecting mass associated with effusion. MRI Brain W WO Contrast   Final Result   Rounded focus of enhancement and restricted diffusion in the left frontal   lobe concerning for metastatic disease. There is a smaller punctate   enhancing focus medial to this in the left frontal lobe as well. XR CHEST PORTABLE   Final Result   Progressive opacification of the left hemithorax. Port catheter tip in the SVC with no pneumothorax. LABS: All lab results were reviewed by myself, and all abnormals are listed below.   Labs Reviewed   CBC WITH AUTO DIFFERENTIAL - MAGNESIUM   MAGNESIUM       EMERGENCY DEPARTMENTCOURSE:         Vitals:    Vitals:    05/21/20 1600 05/21/20 2000 05/22/20 0000 05/22/20 0400   BP:  94/60 (!) 95/58 109/69   Pulse:  100 94 100   Resp: 18 18 18 18   Temp: 97.3 °F (36.3 °C) 97.4 °F (36.3 °C) 97.2 °F (36.2 °C) 97 °F (36.1 °C)   TempSrc: Temporal Temporal Temporal Temporal   SpO2:  91% 95% 95%   Weight:    286 lb 8 oz (130 kg)   Height:           The patient was given the following medications while in the emergency department:  Orders Placed This Encounter   Medications    DISCONTD: heparin (porcine) injection 7,810 Units    DISCONTD: heparin (porcine) injection 3,910 Units    DISCONTD: heparin 25,000 units in dextrose 5% 250 mL infusion    DISCONTD: heparin (porcine) injection 7,810 Units    DISCONTD: heparin (porcine) injection 10,000 Units    DISCONTD: heparin (porcine) injection 10,000 Units    DISCONTD: heparin (porcine) injection 5,000 Units    DISCONTD: heparin 25,000 units in dextrose 5% 250 mL infusion    enoxaparin (LOVENOX) injection 130 mg    pantoprazole (PROTONIX) tablet 40 mg    atorvastatin (LIPITOR) tablet 20 mg    metoprolol succinate (TOPROL XL) extended release tablet 100 mg    furosemide (LASIX) tablet 20 mg    sodium chloride flush 0.9 % injection 10 mL    sodium chloride flush 0.9 % injection 10 mL    OR Linked Order Group     acetaminophen (TYLENOL) tablet 650 mg     acetaminophen (TYLENOL) suppository 650 mg    polyethylene glycol (GLYCOLAX) packet 17 g    OR Linked Order Group     promethazine (PHENERGAN) tablet 12.5 mg     ondansetron (ZOFRAN) injection 4 mg    nicotine (NICODERM CQ) 21 MG/24HR 1 patch    enoxaparin (LOVENOX) injection 135 mg     Pharmacy may adjust dose if needed    aspirin chewable tablet 81 mg     Cancel order if ASA therapy duplicated (already on ASA at home).     DISCONTD: dilTIAZem injection 10 mg    DISCONTD: dilTIAZem 125 mg in dextrose 5 % 125 mL infusion    dilTIAZem (CARDIZEM CD) extended release capsule 240 mg    DISCONTD: enalapril (VASOTEC) tablet 20 mg    dilTIAZem injection 10 mg    DISCONTD: dilTIAZem 125 mg in dextrose 5 % 125 mL infusion    DISCONTD: dofetilide (TIKOSYN) capsule 500 mcg    magnesium sulfate 1 g in dextrose 5% 100 mL IVPB    levalbuterol (XOPENEX) nebulizer solution 1.25 mg    sodium chloride nebulizer 0.9 % solution 3 mL    budesonide-formoterol (SYMBICORT) 160-4.5 MCG/ACT inhaler 2 puff    DISCONTD: piperacillin-tazobactam (ZOSYN) 3.375 g in dextrose 5 % 50 mL IVPB extended infusion (mini-bag)    DISCONTD: piperacillin-tazobactam (ZOSYN) 4.5 g in dextrose 5 % 50 mL IVPB    DISCONTD: cefepime (MAXIPIME) 2 g IVPB minibag    levoFLOXacin (LEVAQUIN) 750 MG/150ML infusion 750 mg    DISCONTD: dilTIAZem 125 mg in dextrose 5 % 125 mL infusion    apixaban (ELIQUIS) tablet 10 mg    apixaban (ELIQUIS) tablet 5 mg    0.9 % sodium chloride bolus    0.9 % sodium chloride infusion    dofetilide (TIKOSYN) capsule 250 mcg    dofetilide (TIKOSYN) capsule 500 mcg    gadoteridol (PROHANCE) injection 20 mL    sodium chloride flush 0.9 % injection 10 mL    enalapril (VASOTEC) tablet 10 mg    dexamethasone (DECADRON) injection 4 mg     CONSULTS:  IP CONSULT TO INTERNAL MEDICINE  IP CONSULT TO CARDIOLOGY  IP CONSULT TO VASCULAR SURGERY  IP CONSULT TO HEM/ONC  IP CONSULT TO PULMONOLOGY  IP CONSULT TO PALLIATIVE CARE    FINAL IMPRESSION      1. Hypoxia    2. Small cell lung cancer ([address]    3. Atrial fibrillation with RVR ([address]    4. Acute deep vein thrombosis (DVT) of left upper extremity, unspecified vein ([address]          DISPOSITION/PLAN   DISPOSITION Admitted 05/19/2020 04:55:33 PM      PATIENT REFERRED TO:  Anthony Turner MD  [address] [place] [address] [address] [facility]  [phone]    now known as michael maldonado.  they will provide RN in the home     DISCHARGE MEDICATIONS:  Current Discharge Medication List        Debbie Jenkins MD  Attending Emergency Physician                    Socorro Quinones MD  05/22/20 0617

## 2020-05-19 NOTE — H&P
Yasmine Sahu MD   ondansetron (ZOFRAN ODT) 8 MG TBDP disintegrating tablet Place 1 tablet under the tongue every 8 hours as needed for Nausea or Vomiting 5/11/20   Anoop Otero MD   lidocaine-prilocaine (EMLA) 2.5-2.5 % cream Apply topically as needed. 5/11/20   Anoop Otero MD   Elastic Bandages & Supports (JOBST KNEE HIGH COMPRESSION SM) MISC 1 each by Does not apply route daily as needed (lower ext edema) 5/8/20   David Reddy MD         This is a 77 y.o. male who is pleasant, cooperative, alert and oriented x3, in no acute distress. Heart: BP 93/49  Repear 114/62 Heart sounds are distant   HR 95 irregularly irregular rhythm (A Fib)  No murmur, gallop or rub. Neck: Enlarged supraclavicular lymph nodes   Lungs: Normal respiratory effort with diminished breath sounds greater on left No wheezes or rales bilaterally   Abdomen: round, obese,  nontender, nondistended with decreased bowel sounds. Extremities: swelling and ecchymosis down left upper arm Bruising on both upper extremities  Radial pulse palpable with capillary refill Hand grasps equal bilaterally       Labs:  Recent Labs     05/19/20  1145 05/08/20  0808  05/05/20  0319   HGB  --  13.6  --   --    HCT  --  43.0  --   --    WBC  --  9.8  --   --    MCV  --  92.3  --   --     203   < >  --    NA  --  140  --  137   K  --  3.6*  --  4.0   CL  --  97*  --  97*   CO2  --  28  --  29   BUN  --  32*  --  33*   CREATININE  --  0.59*  --  0.77   GLUCOSE  --  128*  --  122*   INR 1.4  --   --  1.2   PROTIME 16.5*  --   --  15.0*   APTT 32.6  --    < >  --    AST  --   --   --  35   ALT  --   --   --  18   LABALBU  --   --   --  3.2*    < > = values in this interval not displayed.        Recent Labs     05/16/20  0730   COVID19       Not Detected             ALCON Downey-BC  Electronically signed 5/19/2020 at 1:00 PM      David Reddy MD   Physician   Internal Medicine   Discharge Summary   Signed   Date of Service:  2020  2:18 PM          Related encounter: ED to Hosp-Admission (Discharged) from 2020 in [address]        Discharge Summary     Patient ID: Nara Nava  :             MRN: [id number]                                     ACCOUNT:  [de-identified]   Patient Location : [address] [place] [address]  Patient's PCP: Melonie Schmidt MD  Admit Date: 2020   Discharge Date: 2020     Length of Stay: 4  Code Status:  Prior  Admitting Physician: Naa Willis MD  Discharge Physician: Jn Zamora MD      Active Discharge Diagnosis :      Primary Problem  New onset a-fib [facility])       R Dmitry Gonzalez 1 Problems     Diagnosis Date Noted    Lymphadenopathy [R59.1]      New onset a-fib ([facility] [address] [I48.91]      Dyslipidemia [E78.5]      Mass of thoracic structure [R22.2]      Class 2 severe obesity due to excess calories with serious comorbidity in adult ([facility] [address] [E66.01]      Tobacco abuse [Z72.0] 2013    Essential hypertension [I10]           Admission Condition:  fair        Discharged Condition: stable     Hospital Stay:      Hospital Course:  Nara Nava is a 77 y.o. male who was admitted for the management of   New onset a-fib ([facility] [address] , presented to ER with Shortness of Breath (onset 2 weeks, seen PCP today); Shoulder Pain (left); and Mass (left neck)  Patient came to the hospital shortness of breath which was progressively worsening for the last 2 weeks associated with nonproductive cough, palpitations. Patient also reported left-sided swelling and neck which has been painful intermittently. Patient was evaluated by PCP and was found to have irregular rhythm with increased heart rate. He was sent for further evaluation. Patient had outpatient nasopharyngeal swab for SARS-CoV-2 which was negative. He has underlying history of hypertension, dyslipidemia.   He is former smoker and quit about 3 years before. Evaluation emergency room showed A. fib with RVR. Patient was given Cardizem bolus and started on Cardizem infusion and heparin for anticoagulation. Further imaging of the chest showed masslike opacity in left hilum with airspace disease in mid and lower lungs. CT chest was negative for PE. 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 Also reported left renal calculus with mild left hydronephrosis. Lab evaluation showed leukocytosis 12.2, ALT 1354, ferritin 656. Patient has history of kidney stones. He also had history of pulmonary embolism and was on Coumadin about 20 years before. Patient was treated with Cardizem infusion and switched to oral Cardizem. He was given heparin infusion for anticoagulation . Patient underwent left supraclavicular lymph node biopsy results of which are pending. Significant therapeutic interventions:   1. Atrial fibrillation with rapid ventricular response  - new onset, started on oral Cardizem, amlodipine stopped. treated with heparin. Discharged on Eliquis 5 mg twice daily. Echocardiogram showed preserved ejection fraction  2. Left hilar mass -concerning for malignancy. S/p LN biopsy. Follow-up with oncology as outpatient for results. 3. Left pulmonary artery stenosis secondary to mass -   4. COVID-19 infection ruled out with  SARS-CoV-2 PCR negative. 5. Essential Hypertension - well controlled. 6. Lower extremity edema-Lasix, compression stocking  7. Chronic bronchitis - albuterol   8. H/o pulmonary embolism treated with Coumadin 20 years before.   9. Kidney stones -follow-up with urology outpatient     Significant Diagnostic Studies:   Labs / Micro:/Radiology          Recent Labs     05/08/20  0808 05/08/20  0453 05/06/20  0544 05/04/20  2049 05/04/20  1504   WBC 9.8  --   --  11.2 12.2*   HGB 13.6  --   --  15.4 15.7   HCT 43.0  --   --  47.1 48.5   MCV 92.3  -- SYSTEMS:       · General: Positive for weakness and fatigue. . No unanticipated weight loss or decreased appetite. No fever or chills. · Eyes: No blurred vision, eye pain or double vision. · Ears: No hearing problems or drainage. No tinnitus. · Throat: No sore throat, problems with swallowing or dysphagia.  +losing voice  · Respiratory: +dsypnea and streaks of hemoptysis in phlegm  · Cardiovascular: No chest pain, orthopnea or PND. No lower extremity edema. No palpitation. · Gastrointestinal: No problems with swallowing. No abdominal pain or bloating. No nausea or vomiting. No diarrhea or constipation. No GI bleeding. · Genitourinary: No dysuria, hematuria, frequency or urgency. · Musculoskeletal: No muscle aches or pains. No limitation of movement. No back pain. No gait disturbance, No joint complaints. · Dermatologic: No skin rashes or pruritus. No skin lesions or discolorations. · Psychiatric: No depression, anxiety, or stress or signs of schizophrenia. No change in mood or affect. · Hematologic: No history of bleeding tendency. No bruises or ecchymosis. No history of clotting problems. · Infectious disease: No fever, chills or frequent infections. · Endocrine: No polydipsia or polyuria. No temperature intolerance. · Neurologic: No headaches or dizziness. No weakness or numbness of the extremities. No changes in balance, coordination,  memory, mentation, behavior. · Allergic/Immunologic: No nasal congestion or hives. No repeated infections.          PHYSICAL EXAM:      /79 (Site: Left Upper Arm, Position: Sitting, Cuff Size: Medium Adult)   Pulse 69   Temp 97.2 °F (36.2 °C) (Temporal)   Resp 22   Wt 288 lb 14.4 oz (131 kg)   BMI 37.09 kg/m²    Temp (24hrs), Av.7 °F (36.5 °C), Min:97.3 °F (36.3 °C), Max:97.9 °F (36.6 °C)        General appearance - not in pain or distress  Mental status - alert and oriented  Eyes - pupils equal and reactive, extraocular eye movements intact  Ears - bilateral TM's and external ear canals normal  Nose - normal and patent, no erythema, discharge or polyps  Mouth - mucous membranes moist, pharynx normal without lesions  Neck - supple, no significant adenopathy  Lymphatics -enlarged 1 inch left supraclavicular lymph node. No other palpable lymph nodes. Chest - clear to auscultation, no wheezes, rales or rhonchi, symmetric air entry  Heart - normal rate, regular rhythm, normal S1, S2, no murmurs, rubs, clicks or gallops  Abdomen - soft, nontender, nondistended, no masses or organomegaly  Neurological - alert, oriented, normal speech, no focal findings or movement disorder noted  Musculoskeletal - no joint tenderness, deformity or swelling  Extremities - peripheral pulses normal, no pedal edema, no clubbing or cyanosis  Skin - normal coloration and turgor, no rashes, no suspicious skin lesions noted               DATA:    CT scan 5/4/2020      Impression   No acute central or segmental pulmonary embolus. Questionable left lower lobe chronic nonocclusive pulmonary embolus versus   tumor infiltration. 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 Massive mediastinal and left hilar adenopathy. Multiple conglomerate left   lower lobe masses. Mild left-sided hydronephrosis with suspected partially obstructing calculi   within the left renal pelvis. Labs:   Pathology from cervical node 5/6/2020  -- Diagnosis --   LYMPH NODE, LEFT SUPRACLAVICULAR, ULTRASOUND-GUIDED CORE NEEDLE   BIOPSIES:             -  METASTATIC SMALL CELL CARCINOMA. IMPRESSION:    1. New onset A. fib with rapid ventricular response, controlled. 2. COPD  3. Lung mass suspicious for primary lung malignancy  4. Left supraclavicular lymph node enlargement. Likely metastatic disease. 5. Chronic tobacco abuse  6. Biopsy showed small cell carcinoma. 7. Staging in progress  RECOMMENDATIONS:  1.  We reviewed his pathology which shows small cell lung cancer and I explained diagnosis. Based on what is seen on the CT scan, I suspect that he has an extensive disease. But we will confirm that with PET CT scan and brain MRI. 2. He is becoming symptomatic very quickly, I think there is urgency to start treatment in the next few days. 3. I confirmed that his hemoptysis and loss of voice are disease related. Likely due to involvement of recurrent laryngeal nerve  4. I explained plan for staging studies and chemo recommendation. Likely to start him on carboplatin and etoposide plus immunotherapy. With the remote possibility of adding radiation if no metastatic disease appreciated. 5. I discussed prognostic data and natural history of the disease. 6. He elected to proceed with treatment. 7. I am ordering brain MRI and PET for staging. 8. I am writing for chemo and port insertion. 9. Return to commence with red once approved        Isela Juares MD                                 This note is created with the assistance of a speech recognition program.  While intending to generate a document that actually reflects the content of the visit, the document can still have some errors including those of syntax and sound a like substitutions which may escape proof reading. It such instances, actual meaning can be extrapolated by contextual diversion. Hematology oncology            ·   Office Visit on 5/11/2020   ·   ·   Detailed Report   ·   ·   Note shared with patient   Progress Notes Info     Author Note Status Last Update User   Isela Juares MD Signed Isela Juares MD   Last Update Date/Time: 5/11/2020  3:27 PM   Chart Review Routing History     No routing history on file.

## 2020-05-19 NOTE — PRE SEDATION
Sedation Pre-Procedure Note    Patient Name: Leidy Wild   YOB: 1945  Room/Bed: Room/bed info not found  Medical Record Number: [id number]  Date: 5/19/2020   Time: 12:00 PM       Indication:  Lung cancer    Consent: I have discussed with the patient and/or the patient representative the indication, alternatives, and the possible risks and/or complications of the planned procedure and the anesthesia methods. The patient and/or patient representative appear to understand and agree to proceed. Vital Signs:   Vitals:    05/19/20 1131   BP: (!) 93/49   Pulse: 117   Resp: 20   SpO2: 90%       Past Medical History:   has a past medical history of Atrial fibrillation with RVR ([facility] [address] Cancer ([facility] [address] COPD (chronic obstructive pulmonary disease) ([facility] [address] Hx of blood clots, Hyperlipidemia, Hypertension, Kidney stones, Lung mass, and Supraclavicular lymphadenopathy. Past Surgical History:   has no past surgical history on file. Medications:   Scheduled Meds:    ceFAZolin  1 g Intravenous On Call to OR     Continuous Infusions:    sodium chloride       PRN Meds:   Home Meds:   Prior to Admission medications    Medication Sig Start Date End Date Taking?  Authorizing Provider   benzonatate (TESSALON) 200 MG capsule Take 1 capsule by mouth 3 times daily as needed for Cough 5/15/20  Yes Juwan Fontaine MD   dilTIAZem (CARDIZEM CD) 240 MG extended release capsule Take 1 capsule by mouth daily 5/9/20  Yes Josias Albert MD   furosemide (LASIX) 20 MG tablet Take 1 tablet by mouth daily 5/9/20  Yes Josias Albert MD   apixaban (ELIQUIS) 5 MG TABS tablet Take 1 tablet by mouth 2 times daily 5/8/20 6/7/20 Yes Josias Albert MD   enalapril (VASOTEC) 20 MG tablet TAKE 1 TABLET DAILY 2/14/20  Yes Juwan Fontaine MD   metoprolol succinate (TOPROL XL) 100 MG extended release tablet TAKE 1 TABLET DAILY 2/14/20  Yes Juwan Fontaine MD   atorvastatin (LIPITOR) 20 MG tablet TAKE 1 TABLET BY MOUTH ONE TIME A DAY 2/14/20  Yes Tracey Cardenas MD   omeprazole (PRILOSEC) 20 MG delayed release capsule Take 1 capsule by mouth daily 5/11/20   Brenden Otero MD   ondansetron (ZOFRAN ODT) 8 MG TBDP disintegrating tablet Place 1 tablet under the tongue every 8 hours as needed for Nausea or Vomiting 5/11/20   Ariel Sandoval MD   lidocaine-prilocaine (EMLA) 2.5-2.5 % cream Apply topically as needed. 5/11/20   Anoop Otero MD   Elastic Bandages & Supports (JOBST KNEE HIGH COMPRESSION SM) MISC 1 each by Does not apply route daily as needed (lower ext edema) 5/8/20   Radha Guillen MD     Coumadin Use Last 7 Days:  no  Antiplatelet drug therapy use last 7 days: no  Other anticoagulant use last 7 days: no  Additional Medication Information:  See [facility]      Pre-Sedation Documentation and Exam:   I have reviewed the patient's history and review of systems.     Mallampati Airway Assessment:  Mallampati Class II - (soft palate, fauces & uvula are visible)    Prior History of Anesthesia Complications:   none    ASA Classification:  Class 2 - A normal healthy patient with mild systemic disease    Sedation/ Anesthesia Plan:   intravenous sedation    Medications Planned:   midazolam (Versed) intravenously and fentanyl intravenously    Patient is an appropriate candidate for plan of sedation: yes    Electronically signed by Conine Junior MD on 5/19/2020 at 12:00 PM

## 2020-05-19 NOTE — CONSULTS
Today's Date: 5/19/2020  Patient Name: Irene You  Date of admission: 5/19/2020  3:27 PM  Patient's age: 77 y.o., 1945  Admission Dx: Atrial fibrillation with RVR ([address] [I48.91]    Reason for Consult: management recommendations  Requesting Physician: Laura Gaytan MD    CHIEF COMPLAINT: Left upper extremity swelling. Cough. Shortness of breath. Small cell lung cancer. History Obtained From:  patient, electronic medical record    HISTORY OF PRESENT ILLNESS:      The patient is a 77 y.o.  male who is admitted to the hospital for chief complaints of fatigue. Patient was recently diagnosed with a small cell lung cancer. Patient underwent port placement yesterday. Was noted to have swelling of his left upper extremity. Patient has been on anticoagulation however anticoagulation was held for a couple of days for the port placement. Ultrasound of the upper extremity shows an acute subclavian vein DVT. Patient was subsequently sent to the ER. Further evaluation revealed A. fib with RVR. Patient was then admitted to the hospital.  At time of evaluation patient does complain of being tired but denies any chest pain. According the patient left arm swelling started about 2 days ago. He has also started noticing mass at the base of his neck on the left side. 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 Past Surgical History:   has a past surgical history that includes Tunneled venous port placement (05/19/2020). Medications:    Prior to Admission medications    Medication Sig Start Date End Date Taking?  Authorizing Provider   benzonatate AUBREE LouieN - NP        sodium chloride flush 0.9 % injection 10 mL  10 mL Intravenous PRN Zoya Liban, APRN - NP        acetaminophen (TYLENOL) tablet 650 mg  650 mg Oral Q6H PRN Zoya Liban, APRN - NP        Or    acetaminophen (TYLENOL) suppository 650 mg  650 mg Rectal Q6H PRN Zoya Liban, APRN - NP        polyethylene glycol (GLYCOLAX) packet 17 g  17 g Oral Daily PRN Zoya Liban, APRN - NP        promethazine (PHENERGAN) tablet 12.5 mg  12.5 mg Oral Q6H PRN Zoya Liban, APRN - NP        Or    ondansetron (ZOFRAN) injection 4 mg  4 mg Intravenous Q6H PRN Zoya Liban, APRN - NP        nicotine (NICODERM CQ) 21 MG/24HR 1 patch  1 patch Transdermal Daily PRN Zoya Louie, APRN - NP        [START ON 5/20/2020] enoxaparin (LOVENOX) injection 135 mg  1 mg/kg Subcutaneous BID Zoya Louie APRN - NP       [facility] Wendy ON 5/20/2020] aspirin chewable tablet 81 mg  81 mg Oral Daily Zoya Louie, APRN - NP        dilTIAZem injection 10 mg  10 mg Intravenous Once LEROY Carl NP        Followed by   [facility] dilTIAZem 125 mg in dextrose 5 % 125 mL infusion  5 mg/hr Intravenous Continuous Zoya Louie APRN - NP         Facility-Administered Medications Ordered in Other Encounters   Medication Dose Route Frequency Provider Last Rate Last Dose    0.9 % sodium chloride infusion   Intravenous Continuous Keily Chow MD        0.9 % sodium chloride infusion   Intravenous Continuous Keily Chow MD           Allergies:  Patient has no known allergies. Social History:   reports that he quit smoking about 2 weeks ago. His smoking use included cigars and cigarettes. He has a 50.00 pack-year smoking history. He has never used smokeless tobacco. He reports previous alcohol use. He reports that he does not use drugs. Family History: family history includes Heart Disease in his father and mother; Hemochromatosis in his sister.     REVIEW OF SYSTEMS: Constitutional: No fever or chills. No night sweats, no weight loss   Eyes: No eye discharge, double vision, or eye pain   HEENT: negative for sore mouth, sore throat, hoarseness and voice change   Respiratory: Positive shortness of breath with exertion  Cardiovascular: negative for chest pain, dyspnea, palpitations, orthopnea, PND   Gastrointestinal: negative for nausea, vomiting, diarrhea, constipation, abdominal pain, Dysphagia, hematemesis and hematochezia   Genitourinary: negative for frequency, dysuria, nocturia, urinary incontinence, and hematuria   Integument: negative for rash, skin lesions, bruises.    Hematologic/Lymphatic: negative for easy bruising, bleeding, lymphadenopathy, or petechiae   Endocrine: negative for heat or cold intolerance,weight changes, change in bowel habits and hair loss   Musculoskeletal: negative for myalgias, arthralgias, pain, joint swelling,and bone pain   Neurological: negative for headaches, dizziness, seizures, weakness, numbness    PHYSICAL EXAM:        BP (!) 130/51   Pulse 106   Temp 98.4 °F (36.9 °C) (Temporal)   Resp 30   Ht 6' 2\" (1.88 m)   Wt 287 lb (130.2 kg)   SpO2 92%   BMI 36.85 kg/m²    Temp (24hrs), Av.4 °F (36.9 °C), Min:98.4 °F (36.9 °C), Max:98.4 °F (36.9 °C)      General appearance - well appearing, no in pain or distress   Mental status - alert and cooperative   Eyes - pupils equal and reactive, extraocular eye movements intact   Ears - bilateral TM's and external ear canals normal   Mouth - mucous membranes moist, pharynx normal without lesions   Neck - supple, no significant adenopathy   Lymphatics - no palpable lymphadenopathy, no hepatosplenomegaly   Chest -decreased breathing sounds more so on the left side  Heart - normal rate, regular rhythm, normal S1, S2, no murmurs  Abdomen - soft, nontender, nondistended, no masses or organomegaly   Neurological - alert, oriented, normal speech, no focal findings or movement disorder noted nonocclusive pulmonary embolus versus tumor infiltration. Left main pulmonary artery is surrounded by tumor which causes narrowing. Massive mediastinal and left hilar adenopathy. Multiple conglomerate left lower lobe masses. Mild left-sided hydronephrosis with suspected partially obstructing calculi within the left renal pelvis. Ir Port Placement > 5 Years    Result Date: 5/19/2020  PROCEDURE: ULTRASOUND GUIDED VASCULAR ACCESS. FLUOROSCOPY GUIDED PLACEMENT OF A SUBCUTANEOUS PORT-A-CATH. MODERATE CONSCIOUS SEDATION 5/19/2020. HISTORY: ORDERING SYSTEM PROVIDED HISTORY: Malignant neoplasm of left lung, unspecified part of lung (HCC) SEDATION: 1.5 mgversed and 100 micro g fentanyl were titrated intravenously for moderate sedation monitored under my direction. Total intraservice time of sedation was 30 minutes. The patient's vital signs were monitored throughout the procedure and recorded in the patient's medical record by the nurse. FLUOROSCOPY DOSE AND TYPE OR TIME AND EXPOSURES:  mGy cm squared TECHNIQUE: Informed consent was obtained after a detailed explanation of the procedure including risks, benefits, and alternatives. Universal protocol was observed. The right neck and chest were prepped and draped in sterile fashion using maximum sterile barrier technique. Local anesthesia was achieved with lidocaine. A micropuncture needle was used to access the right internal jugular vein using ultrasound guidance. An ultrasound image demonstrating patency of the vein with needle tip located within it. An image was obtained and stored in PACs. A 0.035 guidewire was used to place a peel-away sheath. A chest wall incision was made and a subcutaneous pocket was created. The port reservoir was placed within the pocket and the port tubing was attached and tunneled subcutaneously to the venotomy site.   The port tubing was cut to an appropriate length and advanced through the peel-away sheath under fluoroscopic WHOLE BODY PET/CT 5/14/2020 TECHNIQUE: Following IV injection of 12.2 mCi of F 18 -FDG, PET  tumor imaging was acquired from the base of the skull to the mid thighs. Computed tomography was used for purposes of attenuation correction and anatomic localization. Fusion imaging was utilized for interpretation. Uptake time 67 mins. Glucose level 132 mg/dl. COMPARISON: CT chest 05/04/2020. Left supraclavicular lymph node biopsy 05/06/2020. Sonya Knutson HISTORY: ORDERING SYSTEM PROVIDED HISTORY: Small cell lung cancer ([facility] TECHNOLOGIST PROVIDED HISTORY: small cell lung cancer intial staging Reason for Exam: Small cell lung CA, mets to mediastinal lymph node and supraclavicular node, initial staging Acuity: Acute Type of Exam: Initial FINDINGS: HEAD/NECK: Small foci of metabolic activity are noted in the maxilla and mandible with associated dental disease noted. Large left supraclavicular soft tissue mass corresponding to the site of biopsy measures 9.0 x 3.4 cm and demonstrates abnormal metabolic activity (SUV max 26). CHEST: Extensive mediastinal and left hilar lymphadenopathy noted with abnormal metabolic activity. Representative conglomerate anterior mediastinal dawson mass has an SUV max of 20. Some of the lymph nodes in the right paratracheal region and left hilum have only peripheral metabolic activity suggesting central necrosis. The soft tissue abnormality extending into the left lower lobe also demonstrates abnormal metabolic activity (SUV max of 20). The pleural fluid relatively loculated in the superior aspect of the left major fissure is without metabolic abnormality. ABDOMEN/PELVIS: 14 mm soft tissue nodule along the surface of the tail the pancreas demonstrates abnormal metabolic activity (SUV max 29). BONES/SOFT TISSUE: Widespread foci of abnormal metabolic abnormality are present throughout the osseous structures without definable lytic or blastic lesions.  INCIDENTAL CT FINDINGS: Paraseptal emphysematous disease. Loculated left pleural fluid, as above. Cholelithiasis. 6 mm stone at the left UPJ with excreted radiotracer distal to the stone noted. Mild pelvicaliectasis. Nonobstructing punctate stone in the right kidney and right renal cyst.     1.  Widespread markedly metabolically active neoplastic disease involving the left supraclavicular lymph node, conglomerate mediastinal lymph nodes and left pulmonary opacities. Multifocal metabolic abnormalities throughout the skeleton without defined lytic or blastic lesions. 2.  Incidental findings include loculated pleural fluid in the superior aspect of the left major fissure. Cholelithiasis and bilateral nephrolithiasis. 6 mm stone is present in the left UPJ without evidence for complete obstruction. 3.  Focal areas of metabolic activity in the maxilla and mandible favored to be related to dental disease. Vl Upper Extremity Venous Duplex Left    Result Date: 5/19/2020    [facility]  Vascular Upper Extremities Veins Procedure   Patient Name     Allyson Castillo Date of Study             05/19/2020                   M   Date of Birth    1945  Gender                    Male   Age              77 year(s)  Race                         Room Number      OPT   Corporate ID #   [id number]   Patient Acct #   [de-identified]   MR #             [id number]     [facility]   Accession #      [id number]   Interpreting Physician    Yue Parisi   Referring Nurse              Referring Physician  Practitioner  Procedure Type of Study:   Veins: Upper Extremities Veins, Venous Scan Upper Left. Indications for Study:Pain, edema, discoloration. Patient Status:Out Patient. Technical Quality:Adequate visualization.   - Critical Result:pt in PACU. Comments:LUE edema. CA, tumor left chest  Conclusions   Summary   Acute deep vein thrombosis of the left upper extremity involving the  subclavian vein.   No evidence of superficial venous thrombosis in the left upper extremity. Signature   ----------------------------------------------------------------  Electronically signed by Wilmer Sharif(Sonographer) on  05/19/2020 02:42 PM  ----------------------------------------------------------------   ----------------------------------------------------------------  Electronically signed by Mich Dang(Interpreting physician)  on 05/19/2020 06:33 PM  ----------------------------------------------------------------    Left Impression:   Left internal jugular, axillary, brachial, ulnar, radial, cephalic and   basilic veins are compressible with normal doppler responses. There is evidence of deep vein thrombosis in the left upper extremity,   the subclavian vein is non-compressible with echogenicity. edema noted. Velocities are measured in cm/s ; Diameters are measured in cm Left UE Vein Measurements 2D Measurements +------------------------------------+----------+---------------+----------+ ! Location                            ! Visualized! Compressibility! Thrombosis! +------------------------------------+----------+---------------+----------+ ! Prox IJV                            ! Yes       ! Yes            ! None      ! +------------------------------------+----------+---------------+----------+ ! Dist IJV                            ! Yes       ! Yes            ! None      ! +------------------------------------+----------+---------------+----------+ ! Prox SCV                            ! Yes       ! No             !Acute     ! +------------------------------------+----------+---------------+----------+ ! Dist SCV                            ! Yes       ! No             !Acute     ! +------------------------------------+----------+---------------+----------+ ! Innominate                          ! Yes       ! Yes            ! None      ! +------------------------------------+----------+---------------+----------+ ! Prox Axillary                       ! Yes !Yes            !None      ! +------------------------------------+----------+---------------+----------+ ! Dist Axillary                       ! Yes       ! Yes            ! None      ! +------------------------------------+----------+---------------+----------+ ! Prox Brachial                       !Yes       ! Yes            ! None      ! +------------------------------------+----------+---------------+----------+ ! Dist Brachial                       !Yes       ! Yes            ! None      ! +------------------------------------+----------+---------------+----------+ ! Prox Radial                         !Yes       ! Yes            ! None      ! +------------------------------------+----------+---------------+----------+ ! Dist Radial                         !Yes       ! Yes            ! None      ! +------------------------------------+----------+---------------+----------+ ! Prox Ulnar                          ! Yes       ! Yes            ! None      ! +------------------------------------+----------+---------------+----------+ ! Dist Ulnar                          ! Yes       ! Yes            ! None      ! +------------------------------------+----------+---------------+----------+ ! Basilic at UA                       ! Yes       ! Yes            ! None      ! +------------------------------------+----------+---------------+----------+ ! Basilic at AF                       ! Yes       ! Yes            ! None      ! +------------------------------------+----------+---------------+----------+ ! Basilic at [address]                       ! Yes       ! Yes            ! None      ! +------------------------------------+----------+---------------+----------+ ! Cephalic at UA                      ! Yes       ! Yes            ! None      ! +------------------------------------+----------+---------------+----------+ ! Cephalic at AF                      ! Yes       ! Yes            ! None      ! +------------------------------------+----------+---------------+----------+ ! Cephalic at [address]                      ! Yes       ! Yes            ! None      ! +------------------------------------+----------+---------------+----------+ Doppler Measurements +-------------------------+-------------------------+----------------------+ ! Location                 ! Signal                   !Reflux                ! +-------------------------+-------------------------+----------------------+ ! IJV                      ! Phasic                   !                      ! +-------------------------+-------------------------+----------------------+ ! SCV                      ! Absent                   ! No                    ! +-------------------------+-------------------------+----------------------+ ! Innominate               ! Phasic                   ! No                    ! +-------------------------+-------------------------+----------------------+ ! Axillary                 ! Diminished               ! No                    ! +-------------------------+-------------------------+----------------------+ ! Brachial                 !Diminished               ! No                    ! +-------------------------+-------------------------+----------------------+              IMPRESSION:   Primary Problem  <principal problem not specified>    Active Hospital Problems    Diagnosis Date Noted    Atrial fibrillation with RVR ([facility] [I48.91] 05/19/2020       Small cell lung cancer. Multiple foci of FDG uptake on CT PET without corresponding lesion on CT scan possibly bone metastasis  Atrial fibrillation with RVR  Left subclavian DVT, acute  COPD  Chronic tobacco dependence    RECOMMENDATIONS:  1. I personally reviewed results of lab work-up imaging studies and other relevant clinical data. 2. I discussed natural history of small cell lung cancer which is an aggressive cancer. Discussed treatment options.   I also discussed patient's case in person with palliative care team.  Patient at this point wants to proceed with further treatment. 3. I reviewed results of CT PET and possible bone metastasis. 4. I will obtain MRI of brain. He was scheduled to get MRI as an outpatient. 5. Patient is currently restarted on anticoagulation. 6. Patient upper extremity DVT occurred while patient was off anticoagulation for couple of days so this is not considered failure of Eliquis. Okay to go back to Eliquis at time of discharge from medical oncology standpoint  7. Patient scheduled for chemotherapy this Wednesday. 8. Continue symptomatic and supportive care. Discussed with patient and Nurse. Thank you for asking us to see this patient. Krystal Shea MD          This note is created with the assistance of a speech recognition program.  While intending to generate a document that actually reflects the content of the visit, the document can still have some errors including those of syntax and sound a like substitutions which may escape proof reading. It such instances, actual meaning can be extrapolated by contextual diversion.

## 2020-05-20 PROBLEM — J96.01 ACUTE RESPIRATORY FAILURE WITH HYPOXIA (HCC): Status: ACTIVE | Noted: 2020-01-01

## 2020-05-20 NOTE — PROGRESS NOTES
[facility]    Progress Note    5/20/2020    09:36 AM   Name:   Kane Kirk  MRN:     [id number]     Acct:      [de-identified]   Room:   2034/2034-01   Day:  1  Admit Date:  5/19/2020  3:27 PM    PCP:   Jr Chowdhury MD  Code Status:  Full Code    Subjective:     C/C:   Chief Complaint   Patient presents with    Arm Swelling     Interval History Status: not changed. Patient is up in chair on 4L nasal cannula. He is short of breath but not in acute respiratory distress   Cardizem bolus and infusion never given due to patients heart rate being <100   Brief History:   Per my partner   \"The patient is a 77 y.o.  male who presents with Arm Swelling and he is admitted to the hospital for the management of  Atrial fibrillation with RVR ([address] Patient was in IR for PORT placement and was found to have Left upper ext swelling . Doppler showed left subclavian DVT . patient was also found to have tachycadia and was sent to ED. Patient had tachycardia with Afib RVR . He is feeling tired but denies any dyspnea. Evaluation showed leucocytosis. \"  Review of Systems:     Constitutional:  negative for chills, fevers, sweats  Respiratory:  negative for cough, wheezing. Positive for dyspnea on exertion, shortness of breath  Cardiovascular:  negative for chest pain, chest pressure/discomfort, lower extremity edema, palpitations  Gastrointestinal:  negative for abdominal pain, constipation, diarrhea, nausea, vomiting  Neurological:  negative for dizziness, headache    Medications:      Allergies:  No Known Allergies    Current Meds:   Scheduled Meds:    dilTIAZem  240 mg Oral Daily    enalapril  20 mg Oral Daily    dofetilide  500 mcg Oral 2 times per day    magnesium sulfate  1 g Intravenous Q1H    pantoprazole  40 mg Oral QAM AC    atorvastatin  20 mg Oral Daily    metoprolol succinate  100 mg Oral Daily    furosemide  20 mg Oral Daily    sodium chloride flush  10 mL Intravenous 2 times per day    enoxaparin  1 mg/kg Subcutaneous BID    aspirin  81 mg Oral Daily     Continuous Infusions:    dilTIAZem (CARDIZEM) 125 mg in dextrose 5% 125 mL infusion 15 mg/hr (20 1119)     PRN Meds: sodium chloride flush, acetaminophen **OR** acetaminophen, polyethylene glycol, promethazine **OR** ondansetron, nicotine    Data:     Past Medical History:   has a past medical history of Atrial fibrillation with RVR ([facility] Cancer ([facility] COPD (chronic obstructive pulmonary disease) ([facility] Hx of blood clots, Hyperlipidemia, Hypertension, IGT (impaired glucose tolerance), Kidney stones, Lung mass, 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 Social History:   reports that he quit smoking about 2 weeks ago. His smoking use included cigars and cigarettes. He has a 50.00 pack-year smoking history. He has never used smokeless tobacco. He reports previous alcohol use. He reports that he does not use drugs. Family History:   Family History   Problem Relation Age of Onset    Hemochromatosis Sister     Heart Disease Mother     Heart Disease Father        Vitals:  /73   Pulse 107   Temp 96.7 °F (35.9 °C) (Temporal)   Resp 16   Ht 6' 2\" (1.88 m)   Wt 286 lb 6.4 oz (129.9 kg)   SpO2 97%   BMI 36.77 kg/m²   Temp (24hrs), Av °F (36.7 °C), Min:96.7 °F (35.9 °C), Max:98.8 °F (37.1 °C)    No results for input(s): POCGLU in the last 72 hours. I/O (24Hr):   No intake or output data in the 24 hours ending 20 1211    Labs:  Hematology:  Recent Labs     20  1145 20  1557 20  0430   WBC  --  14.6*  --    RBC  --  4.40  --    HGB  --  13.0  --    HCT  --  41.7  --    MCV  --  94.8  --    MCH  --  29.5  --    MCHC  --  31.2  --    RDW  --  14.6*  --     220  --    MPV  --  11.6  --    INR 1.4 1.4 1.4     Chemistry:  Recent Labs     20  2166 05/20/20  0430    140   K 4.1 4.4   CL 99 98   CO2 26 29   GLUCOSE 165* 124*   BUN 30* 34*   CREATININE 0.77 0.86   MG  --  2.3   ANIONGAP 16 13   LABGLOM >60 >60   GFRAA >60 >60   CALCIUM 9.6 9.6     Recent Labs     05/19/20  1557 05/20/20  0430   PROT 6.6 6.2*   LABALBU 3.1* 2.9*   TSH  --  0.79   AST 49* 52*   ALT 19 18   ALKPHOS 63 59   BILITOT 0.65 0.71     ABG:No results found for: POCPH, PHART, PH, POCPCO2, ZPB2ABW, PCO2, POCPO2, PO2ART, PO2, POCHCO3, ZVE1NEU, HCO3, NBEA, PBEA, BEART, BE, THGBART, THB, JSL5KYN, QMGB1RCT, Q3WBPBII, O2SAT, FIO2  No results found for: SPECIAL  No results found for: CULTURE    Radiology:  Xr Chest Portable    Result Date: 5/19/2020  Progressive opacification of the left hemithorax. Port catheter tip in the SVC with no pneumothorax. Ir Port Placement > 5 Years    Result Date: 5/19/2020  Successful ultrasound and fluoroscopy guided right internal jugular vein 8 Portuguese power injectable Port-A-Cath placement. Ready for use. Pet Ct Skull Base To Mid Thigh    Result Date: 5/14/2020  1. Widespread markedly metabolically active neoplastic disease involving the left supraclavicular lymph node, conglomerate mediastinal lymph nodes and left pulmonary opacities. Multifocal metabolic abnormalities throughout the skeleton without defined lytic or blastic lesions. 2.  Incidental findings include loculated pleural fluid in the superior aspect of the left major fissure. Cholelithiasis and bilateral nephrolithiasis. 6 mm stone is present in the left UPJ without evidence for complete obstruction. 3.  Focal areas of metabolic activity in the maxilla and mandible favored to be related to dental disease.        Physical Examination:        General appearance:  alert, cooperative and no distress  Mental Status:  oriented to person, place and time and normal affect  Lungs:  Diminished to auscultation bilaterally, poor air exchange, dyspnea at rest  Heart:  irregular rate and rhythm, no murmur  Abdomen:  soft, nontender, nondistended, normal bowel sounds, no masses, hepatomegaly, splenomegaly  Extremities:  no edema, redness, tenderness in the calves  Skin:  no gross lesions, rashes, induration    Assessment:        Hospital Problems           Last Modified POA    * (Principal) Atrial fibrillation with RVR ([address] 5/19/2020 Yes    Essential hypertension 5/19/2020 Yes    Hypertriglyceridemia (Chronic) 5/19/2020 Yes    Tobacco abuse (Chronic) 5/19/2020 Yes    Small cell lung cancer ([address] 5/19/2020 Yes    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 Afib w/RVR: give cardizem bolus, restart oral cardizem. Cardiology consult pending, await further recommendations   2. Acute respiratory failure and atelectasis due to metastatic lung cancer: currently on 4L nasal cannula, does not wear home oxygen. Consult pulmonology, start incentive spirometer  3. Monitor intake and output  4. Cardiac monitoring    5. LUE acute DVT; Continue Lovenox 1mg/kg, vascular consult pending, await recommendations for DOAC  6. PT/OT evaluation  7. Consult palliative care for goals of treatment, supportive care    8.  Plan discussed with patient and staff     LEROY KULKARNI NP  5/20/2020  12:11 PM

## 2020-05-20 NOTE — PROGRESS NOTES
Patient admitted to room 2034 from ED via stretcher. Patient alert, oriented, denies any pain. Oriented to room and call light within reach.

## 2020-05-20 NOTE — CONSULTS
Pulmonary Medicine and [address] Mikaela Gaffney MD      Patient - Mehnaz Rankin   MRN -  [id number]   Acct # - [de-identified]   - 1945      Date of Admission -  2020  3:27 PM  Date of evaluation -  2020  Room -    Frankie Carr MD Primary Care Physician - Anthony Turner MD     Reason for Consult    Acute hypoxic respiratory failure, postobstructive atelectasis    Assessment   · Acute hypoxic respiratory failure  · Metastatic/extended small cell lung cancer, recently diagnosed   · Left basilar infiltrate/atelectasis,?   postobstructive pneumonia  · 60-pack-year smoking history/Probable COPD   · Suspected obstructive sleep apnea/Obesity  · New onset Afib   · HLD, HTN     Recommendations   · Oxygen via nasal cannula at 2 to 4 L, wean as tolerated  · Home oxygen evaluation prior to discharge  · Xopenex Q 4 hours prn  · Start Symbicort  · Start Zosyn  · Labs: CBC and BMP in am  · BiPAP, while asleep and as needed  · Heart rate control per cardiology, on Cardizem drip  · Oncology following  · Vascular surgery consulted  · DVT prophylaxis, on low molecular weight heparin, therapeutic dose   · Recommend sleep study/PFT as an outpatient  · Plan for chemo next week noted  · Discussed with RN  · Discussed with Dr. 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 ([address]    Atrial fibrillation with RVR ([address]    Acute respiratory failure with hypoxia ([address]       HPI [place]anes nicotine  IV Drips/Infusions   dilTIAZem (CARDIZEM) 125 mg in dextrose 5% 125 mL infusion 15 mg/hr (20 1119)     Home Medications  Medications Prior to Admission: benzonatate (TESSALON) 200 MG capsule, Take 1 capsule by mouth 3 times daily as needed for Cough  omeprazole (PRILOSEC) 20 MG delayed release capsule, Take 1 capsule by mouth daily  ondansetron (ZOFRAN ODT) 8 MG TBDP disintegrating tablet, Place 1 tablet under the tongue every 8 hours as needed for Nausea or Vomiting  lidocaine-prilocaine (EMLA) 2.5-2.5 % cream, Apply topically as needed. dilTIAZem (CARDIZEM CD) 240 MG extended release capsule, Take 1 capsule by mouth daily  furosemide (LASIX) 20 MG tablet, Take 1 tablet by mouth daily  Elastic Bandages & Supports (JOBST KNEE HIGH COMPRESSION SM) MISC, 1 each by Does not apply route daily as needed (lower ext edema)  apixaban (ELIQUIS) 5 MG TABS tablet, Take 1 tablet by mouth 2 times daily  enalapril (VASOTEC) 20 MG tablet, TAKE 1 TABLET DAILY  metoprolol succinate (TOPROL XL) 100 MG extended release tablet, TAKE 1 TABLET DAILY  atorvastatin (LIPITOR) 20 MG tablet, TAKE 1 TABLET BY MOUTH ONE TIME A DAY    Allergies    Patient has no known allergies.   Social History     Social History     Tobacco Use    Smoking status: Former Smoker     Packs/day: 1.00     Years: 50.00     Pack years: 50.00     Types: Cigars, Cigarettes     Last attempt to quit: 2020     Years since quittin.0    Smokeless tobacco: Never Used   Substance Use Topics    Alcohol use: Not Currently     Comment: rare     Family History          Problem Relation Age of Onset    Hemochromatosis Sister     Heart Disease Mother     Heart Disease Father      ROS - 11 systems   General Denies any fever or chills  HEENT Denies any diplopia, tinnitus or vertigo  Resp positive for  cough with sputum and dyspnea  Cardiac Denies any chest pain, palpitations, claudication or edema  GI Denies any melena, hematochezia, hematemesis or pyrosis   Denies any frequency, urgency, hesitancy or incontinence  Heme Denies bruising or bleeding easily  Endocrine Denies any history of diabetes or thyroid disease  Neuro Denies any focal motor or sensory deficits  Psychiatric Denies anxiety, depression, suicidal ideation  Skin Denies rashes, itching, open sores    Vitals     height is 6' 2\" (1.88 m) and weight is 286 lb 6.4 oz (129.9 kg). His temporal temperature is 96.7 °F (35.9 °C). His blood pressure is 108/73 and his pulse is 107. His respiration is 16 and oxygen saturation is 97%. Body mass index is 36.77 kg/m². I/O    No intake or output data in the 24 hours ending 05/20/20 1227  No intake/output data recorded. Patient Vitals for the past 96 hrs (Last 3 readings):   Weight   05/20/20 0400 286 lb 6.4 oz (129.9 kg)   05/19/20 1527 287 lb (130.2 kg)     Exam   General Appearance   Awake, alert, oriented, in no acute distress  HEENT - Head is normocephalic, atraumatic. Pupil reactive to light  Neck - Supple, trachea midline and straight  Lungs - positive findings: Decreased air exchange on the left, good air exchange on the right  Cardiovascular - irregular   abdomen - Soft, nontender, nondistended, no masses or organomegaly  Neurologic - CN II-XII are grossly intact. There are no focal motor or sensory deficits  Skin - No  bleeding  Extremities - No cyanosis, clubbing.  Mild edema    Labs  - Old records and notes have been reviewed in [facility]   CBC     Lab Results   Component Value Date    WBC 14.6 05/19/2020    RBC 4.40 05/19/2020    RBC 5.30 03/14/2012    HGB 13.0 05/19/2020    HCT 41.7 05/19/2020     05/19/2020     03/14/2012    MCV 94.8 05/19/2020    MCH 29.5 05/19/2020    MCHC 31.2 05/19/2020    RDW 14.6 05/19/2020    LYMPHOPCT 8 05/19/2020    MONOPCT 3 05/19/2020    BASOPCT 0 05/19/2020    MONOSABS 0.44 05/19/2020    LYMPHSABS 1.17 05/19/2020    EOSABS 0.00 05/19/2020    BASOSABS 0.00 05/19/2020    DIFFTYPE NOT REPORTED 05/19/2020 BMP   Lab Results   Component Value Date     05/20/2020    K 4.4 05/20/2020    CL 98 05/20/2020    CO2 29 05/20/2020    BUN 34 05/20/2020    CREATININE 0.86 05/20/2020    GLUCOSE 124 05/20/2020    GLUCOSE 112 03/14/2012    CALCIUM 9.6 05/20/2020    MG 2.3 05/20/2020     LFTS  Lab Results   Component Value Date    ALKPHOS 59 05/20/2020    ALT 18 05/20/2020    AST 52 05/20/2020    PROT 6.2 05/20/2020    BILITOT 0.71 05/20/2020    BILIDIR 0.10 07/05/2017    IBILI 0.30 07/05/2017    LABALBU 2.9 05/20/2020    LABALBU 4.2 03/14/2012     PTT  Lab Results   Component Value Date    APTT 31.9 05/19/2020     INR   Lab Results   Component Value Date    INR 1.4 05/20/2020    INR 1.4 05/19/2020    INR 1.4 05/19/2020    PROTIME 17.1 (H) 05/20/2020    PROTIME 17.2 (H) 05/19/2020    PROTIME 16.5 (H) 05/19/2020       Radiology    CXR  5/19/2020    (See actual reports for details)    \"Thank you for asking us to see this patient\"    Case discussed with nurse and patient. Questions and concerns addressed.   Electronically signed by     Ranjit Rosado MD on 5/20/2020 at 1:28 PM  Pulmonary Critical Care and Sleep Medicine,  [facility]  Cell: [phone]  Office: [phone]

## 2020-05-20 NOTE — CONSULTS
..    Palliative Care Inpatient Consult    NAME:  Preeti Broderick  MEDICAL RECORD NUMBER:  [id number]  AGE: 77 y.o. GENDER: male  : 1945  TODAY'S DATE:  2020    Reasons for Consultation:    Provision of information regarding PC and/or hospice philosophies  Complex, time-intensive communication and interdisciplinary psychosocial support  Clarification of goals of care and/or assistance with difficult decision-making  Guidance in regards to resources and transition(s)    Members of PC team contributing to this consultation are :  Sanjuanita Rabago RN    History of Present Illness     The patient is a 77 y.o. Non-/non  male who presents with Arm Swelling    Referred to Palliative Care by   [x] Physician   [] Nursing  [] Family Request   [] Other:       He was admitted to the primary service for Atrial fibrillation with RVR ([address] [I48.91]. His hospital course has been associated with Atrial fibrillation with RVR ([address] The patient has a complicated medical history and has been hospitalized since 2020  3:27 PM.    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 Does patient understand diagnosis/treatment?  yes  Does family/caregiver understand diagnosis/treatment? yes    Assessment        Palliative Performance Scale:    ___100% Full ambulation; normal activity and work; no evidence of disease; able to do own self care; normal intake; fully conscious  ___90% Full ambulation; normal activity and work; some evidence of disease; able to do own self care; normal intake; fully conscious  ___80% Full ambulation; normal activity with effort; some evidence of disease; able to do own self care; normal or reduced intake; fully conscious  ___70% Ambulation reduced; unable to perform normal job/work; significant disease; able to do own self care; normal or reduced intake; fully conscious  ___60%  Ambulation reduced; cannot do hobbies/housework; significant disease; occasional assist; intake normal or reduced; fully conscious/some confusion  ___50%  Mainly sit/lie; can't do any work; extensive disease; considerable assist; intake normal or reduced; fully conscious/some confusion  _x__40%  Mainly in bed; extensive disease; mainly assist; intake normal or reduced; fully conscious/ some confusion   ___30%  Bed bound; extensive disease; total care; intake reduced; fully conscious/some confusion  ___20%  Bed bound; extensive disease; total care; intake minimal; drowsy/coma  ___10%  Bed bound; extensive disease; total care; mouth care only; drowsy/coma  ___0       Death       Risk Assessments:    Miracle Risk Score: [unfilled]    Readmission Risk Score: 18        1 Year Mortality Risk Score: @1YEARMORTALITYRISK@     Plan        Palliative Interaction: Pt sitting up in the chair on my visit. He is alert and oriented. His voice is horse. He does not appear SOB at rest but becomes SOB with talking. Pt has swelling to his upper right neck. He has slight swelling to his ankles/feet. I introduced myself and the palliative role.      GOALS OF CARE: I talked with patient about his illness and he states he was diagnosed at the beginning of this month with lung cancer. He states he was here to get his infusion port placed when he became SOB and sent to the ER. Pt developed A-fib and was admitted. Pt was started on cardizem drip and has converted back to NSR. Pt is scheduled to start chemo next week. He states that he still wishes to do that. When I asked him if his cancer was curable, he states he does not know. I asked him if his cancer has spread to any other part of the body he shrugs his shoulders and says he is not sure. CODE STATUS: I talked with patient about his wishes re: CPR/intubation/Defibrillation. Pt states he would want all of these measures done but nothing for long-term. Pt will remain a full code. SUPPORT: I called patient's wife Tyrone Arellano on the phone. She is very tearful and thanks me for calling. She feels scared that she cannot be with the patient. She states she has not talked to many people and wants to make sure she is updated. Emotional support offered regarding patient's illness and hospitalization. Wife states she suffered a cardiac arrest last year but was revived. I told her about my conversation with patient re: code status. Wife asks about results of PET scan. I explained oncologist will go over the results with her at his appointment next week. I explained that MRI was ordered while he was here and that will be done today or tomorrow. Tyrone Arellano so thankful for the call and support. I gave her my phone number and told her to call me to help with communication if needed. DISCHARGE PLANNING: I asked Tyrone Arellano how patient did at home with walking, bathing etc. Pt states they live in a 2 story home and patient manages both. She states he sleeps in his recliner some nights. Pt states he does ok getting around. She denies any need for medical equipment or nursing services at this time. Plan is for patient to go back home where he resides with his wife.      Will continue to call wife daily for support and assist with communication during these restricted visiting hours r/t Pandemic. Education/support to family  Education/support to patient  Discharge planning/helping to coordinate care  Communications with primary service  Providing support for coping/adaptation/distress of family  Providing support for coping/adaptation/distress of patient  Discussing meaning/purpose   Continue with current plan of care  Clarification of medical condition to patient and family  Code status clarified: Full Code  Talked with patient about his wishes re: treatment. Pt wishes to continue with chemo next week. Pt wishes to remain a full code for now. Support offered to wife over the phone due to restricted visiting hours. Principle Problem/Diagnosis:  Atrial fibrillation with RVR (HCC) [I48.91]    Goals of care evaluation:  The patient goals of care are improve or maintain function/quality of life   Goals of care discussed with:    [] Patient independently    [x] Patient and Family    [] Family or Healthcare DPOA independently    [] Unable to discuss with patient, family/DPOA not present    Code Status  Full Code    Other recommendations:  Please call with any palliative questions or concerns. Palliative Care Team is available via perfect serve or via phone - [phone]. Palliative Care will continue to follow Mr. Garcia's care as needed. Thank you for allowing Palliative Care to participate in the care of Mr. Linda Watson .     Electronically signed by   Miracle Love RN  Palliative Care Team  on 5/20/2020 at 4:04 PM

## 2020-05-20 NOTE — FLOWSHEET NOTE
RN contacted TERI Kenny of patient's recent BP recording of 68/40, followed by recheck of 40/20. Patient reports numbness and tingling in RUE. Patient denies dizziness, C. P., or palpitations. New orders received to d/c cardizem drip (previously infusing at 2.5 ml/hr) and administer fluid bolus of 500 cc over 30 minutes. RN to notify cardiology. Will continue to monitor closely.

## 2020-05-20 NOTE — FLOWSHEET NOTE
Dr. David Ayers arrived at the pt bedside for evaluation and update via RN. Rn notified the Provider that pt was previously taking eliquis 5 mg BID at home and was occasionally noncompliant over the last three weeks. Dr David Ayers then ordered eliquis starter pack (see orders) to begin 5/21 AM. Lovenox to be discontinued, final dose 5/20 PM. Will continue to monitor closely and update as necessary.

## 2020-05-20 NOTE — FLOWSHEET NOTE
Dr. Bernie Ernst arrived to the patient's bedside for evaluation and was updated on the patient's current status via RN. Patient's HR remains in 120-130's and patient continues to deny chest pain and palpitations. New orders received for cardizem drip to start now at 15 mg/hr, tikosyn p.o., and iv magnesium replacement (see orders for details). Dr. Bernie Ernst notified that patient recently received 10 mg cardizem bolus, as well as p.o. cardizem. Will continue to monitor closely.

## 2020-05-20 NOTE — FLOWSHEET NOTE
TERI Kenny in department and was notified that patient is back in A. Fib. cardizem drip re-ordered & ordered to restart at 5 mg/hr. Will continue to monitor closely.

## 2020-05-20 NOTE — PROGRESS NOTES
Physical Therapy    Facility/Department: [facility] CVICU  Initial Assessment    NAME: Shaun Schwab  : 1945  MRN: [id number]    Date of Service: 2020  Pt to [facility] for portal placement  Discharge Recommendations:  Home with assist PRN, Home with Home health PT        Assessment   Body structures, Functions, Activity limitations: Decreased balance;Decreased endurance  Prognosis: Good  Decision Making: High Complexity  PT Education: General Safety  Patient Education: Ankle pumps handout  REQUIRES PT FOLLOW UP: Yes  Activity Tolerance  Activity Tolerance: Patient limited by endurance       Patient Diagnosis(es): The primary encounter diagnosis was Hypoxia. Diagnoses of Small cell lung cancer ([address] Atrial fibrillation with RVR ([address] and Acute deep vein thrombosis (DVT) of left upper extremity, unspecified vein ([address] were also pertinent to this visit. 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 has a past surgical history that includes Tunneled venous port placement (2020).     Restrictions  Restrictions/Precautions  Restrictions/Precautions: General Precautions, Fall Risk  Required Braces or Orthoses?: No  Vision/Hearing        Subjective  General  Chart Reviewed: Yes  Patient assessed for rehabilitation services?: Yes  Response To Previous Treatment: Not applicable  Follows Commands: Within Functional Limits  General Comment  Comments: OK for PT per Annemarie RN's  Pain Screening  Patient Currently in Pain: Denies  Vital Signs  Patient Currently in Pain: Denies       Orientation  Orientation  Overall Orientation Status: Within Normal Limits  Social/Functional History  Social/Functional History  Lives With: Spouse  Type of Home: House  Home Layout: Two level, Bed/Bath upstairs  Home Access: Stairs to enter with rails  Entrance Stairs - Number of Steps: 3  Entrance Stairs - Rails: Both  Bathroom Shower/Tub: Tub/Shower unit  Bathroom Toilet: Handicap height  Bathroom Equipment: Shower chair  Home Equipment: Rolling walker, Quad cane  ADL Assistance: Independent  Homemaking Assistance: Independent(shares with wife)  Ambulation Assistance: Independent(typically does not use AD)  Transfer Assistance: Independent  Active : Yes  Mode of Transportation: Car  Occupation: Retired  Type of occupation: [employer],   Additional Comments: no falls. Cognition        Objective          AROM RLE (degrees)  RLE AROM: WNL  AROM LLE (degrees)  LLE AROM : WNL  AROM RUE (degrees)  RUE General AROM: See OT eval for B UE ROM  Strength RLE  Strength RLE: WFL  Comment: DONNY DAWKINS's 5/5  Strength LLE  Strength LLE: WFL  Strength RUE  Comment: See OT eval for B UE MMT  Tone RLE  RLE Tone: Normotonic  Tone LLE  LLE Tone: Normotonic  Motor Control  Gross Motor?: WNL  Sensation  Overall Sensation Status: WNL  Bed mobility  Comment: Pt in chair when PT arrived  Transfers  Sit to Stand: Contact guard assistance  Stand to sit: Contact guard assistance  Stand Pivot Transfers: Contact guard assistance  Ambulation  Ambulation?: Yes  Ambulation 1  Surface: level tile  Device: No Device  Assistance: Contact guard assistance  Gait Deviations: Decreased step length; Slow Faby  Distance: 35' x 1  Comments: Pt fatigued quickly, SpO2 98% on 2L O2, No O2 during ambulation, 85% s/p ambulation, O2 reappkied, SpO2 93% in 1.5 minutes,97% in 2.5 minutes     Balance  Posture: Good  Sitting - Static: Good  Sitting - Dynamic: Good  Standing - Static: Good  Standing - Dynamic: Good;-        Plan   Plan  Times per week: 1-2x/day,6-7 days/week  Current Treatment Recommendations: Transfer Training, Gait Training, Stair training, Balance Training, Endurance Training  Safety Devices  Type of devices: Gait belt, Left in chair, Chair alarm in place, Call light within reach  Restraints  Initially in place: No    G-Code       OutComes Score                                                  AM-PAC Score             Goals  Short term goals  Time Frame for Short term goals: 8 treatments  Short term goal 1: Independent transfers  Short term goal 2: Independent ambulation 200' x 1  Short term goal 3: Tolerate 30 min ther act  Short term goal 4: Good dynamic standing balance  Short term goal 5:  Independently ascend/descend 12 steps w/ 1 HR  Patient Goals   Patient goals : Be able to go home       Therapy Time   Individual Concurrent Group Co-treatment   Time In 1421         Time Out 1450         Minutes [facility]

## 2020-05-20 NOTE — CONSULTS
placement. ALLERGIES:  No known drug allergies. SOCIAL HISTORY:  The patient has a longstanding history of smoking of  50-pack year history. He reports that he quit smoking 2 weeks ago. Denies excess use of alcohol currently. He reports drinking \"socially\"  in the past.    FAMILY HISTORY:  Reviewed and is negative for sudden cardiac death or  significant coronary artery disease. REVIEW OF SYSTEMS:  A 15-point system review was performed and pertinent  findings are as noted. CONSTITUTIONAL:  Positive for fatigue. HEENT:   Negative for congestion, nose bleeds. RESPIRATORY:  Positive for  shortness of breath. CARDIOVASCULAR:  Negative for abdominal pain,  constipation, or diarrhea. GENITOURINARY:  No dysuria, frequency, or  hematuria. MUSCULOSKELETAL:  No arthralgias or myalgias. NEUROLOGIC:   No history of TIA or CVA. RESPIRATORY:  Positive for history of lung  cancer as mentioned. HEMATOLOGIC:  No bleeding diathesis. PSYCHIATRIC:  No history of  anxiety, depression. A 15-point system review was performed and  pertinent findings are as noted. PHYSICAL EXAMINATION:  GENERAL:  Currently, the patient appears comfortable at bedrest.  VITALS:  Blood pressure of 130/51, pulse rate of 140, irregular,  respiratory rate of 20, weight is 287 pounds. BMI 36.85 Kg/m2. CONSTITUTIONAL:  The patient is awake but somewhat anxious at bedrest.  HEENT:  Cranium is atraumatic, normocephalic. There is no jugular  venous distention. Normal carotid upstrokes. No bruits. HEART:  Examination of the heart reveals S1, S2, irregularly irregular. There is a grade 1 to 2/6 systolic murmur at cardiac apex. LUNGS:  Revealed diminished air entry at bases. ABDOMEN:  Obese. Bowel sounds are present. EXTREMITIES:  Demonstrated significant swelling of the right upper  extremity and 1+ edema of both lower extremities. LABS:  INR is 1.4, BUN 34, creatinine 0.86. Sodium 140, potassium 4.4.    Hemoglobin 13.0, hematocrit 41.7, BUN 30, creatinine 0.77. IMPRESSION:  1. Atrial fibrillation with rapid ventricular response. 2.  Acute left upper extremity deep venous thrombosis. 3.  Malignant left lung cancer reportedly small cell which is metastatic  4. Morbid obesity. 5.  Hypertension. PLAN:  Start the patient on IV diltiazem for ventricular rate control. I discussed options of conversion to sinus rhythm. At this point, we  will start the patient on Tikosyn 500 mcg p.o. b.i.d.  I discussed all  potential side effects of Tikosyn therapy. The patient understands and  agrees to proceed. It appears that the patient was on Eliquis prior to  his hospital admission but he stopped it 2 days prior to his port  placement. It is possible that in the 2-day period that he is not on  Eliquis he could have developed a DVT. However, I will leave it up to  vascular surgery discretion whether to continue him on Eliquis or change  to another anticoagulant for DVT. Overall prognosis is very poor in view of widely metastatic lung cancer. Thank you for allowing me to participate in the care of the patient.         Hi Muhammad    D: 05/20/2020 11:22:50       T: 05/20/2020 11:32:14     [email or web address]  Job#: [id number]     Doc#: [id number]    CC:

## 2020-05-20 NOTE — FLOWSHEET NOTE
Dr. Jamal Peterson returned call and was updated on the patient's current status via RN. Patient has converted to NSR. New order received to gladis/c cardizem drip. Continue with p.o. cardizem and tikosyn. Will continue to monitor closely.

## 2020-05-20 NOTE — PROGRESS NOTES
Occupational Therapy   Occupational Therapy Initial Assessment  Date: 2020   Patient Name: Mehnaz Rankin  MRN: [id number]     : 1945    Date of Service: 2020    Discharge Recommendations:  Home with assist PRN, Home with Home health OT     RN reports patient is medically stable for therapy treatment this date. Chart reviewed prior to treatment and patient is agreeable for therapy. All lines intact and patient positioned comfortably at end of treatment. All patient needs addressed prior to ending therapy session. Assessment   Performance deficits / Impairments: Decreased functional mobility ; Decreased ADL status; Decreased strength;Decreased safe awareness;Decreased balance;Decreased endurance;Decreased posture  Prognosis: Good  Decision Making: Medium Complexity  OT Education: OT Role;Energy Conservation;Plan of Care;Home Exercise Program;Precautions; ADL Adaptive Strategies;Transfer Training;Equipment; Family Education  REQUIRES OT FOLLOW UP: Yes  Activity Tolerance  Activity Tolerance: Patient Tolerated treatment well;Patient limited by fatigue  Safety Devices  Safety Devices in place: Yes  Type of devices: Call light within reach;Nurse notified;Gait belt;Patient at risk for falls; Left in chair           Patient Diagnosis(es): The primary encounter diagnosis was Hypoxia. Diagnoses of Small cell lung cancer ([facility] Atrial fibrillation with RVR ([address] and Acute deep vein thrombosis (DVT) of left upper extremity, unspecified vein ([address] were also pertinent to this visit. 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    has a past surgical history that includes Tunneled venous assistance  LE Bathing: Moderate assistance  UE Dressing: Minimal assistance  LE Dressing: Moderate assistance  Toileting: Minimal assistance; Moderate assistance  Tone RUE  RUE Tone: Normotonic  Tone LUE  LUE Tone: Normotonic  Coordination  Movements Are Fluid And Coordinated: Yes     Bed mobility  Comment: up in chair  Transfers  Sit to stand: Contact guard assistance  Stand to sit: Contact guard assistance     Cognition  Overall Cognitive Status: WFL  Perception  Overall Perceptual Status: WFL     Sensation  Overall Sensation Status: WFL        LUE AROM (degrees)  LUE AROM : WFL  RUE AROM (degrees)  RUE AROM : WFL  LUE Strength  Gross LUE Strength: WFL  RUE Strength  Gross RUE Strength: WFL                   Plan   Plan  Times per week: 4-5x/week, 1-2x/day  Current Treatment Recommendations: Strengthening, Balance Training, Functional Mobility Training, Endurance Training, Equipment Evaluation, Education, & procurement, Patient/Caregiver Education & Training, Self-Care / ADL, Safety Education & Training, Positioning         Goals  Short term goals  Time Frame for Short term goals: by discharge, pt will  Short term goal 1: demo SBA with ADL transfers with AD/DME as needed and good safety  Short term goal 2: demo SBA with functional mob in room with good safety/pacing and AD as needed  Short term goal 3: demo SBA with toileting routine  Short term goal 4: demo I with UB ADLs and SBA with LB ADLs with AE/DME as needed and with good safety/pacing  Short term goal 5: demo and verb good understanding of fall prevention techs, EC/WS techs, and possible equip needs for home  Patient Goals   Patient goals : to go home       Therapy Time   Individual Concurrent Group Co-treatment   Time In 1417(+10 min chart review)         Time Out 1445         Minutes 28              Pt would benefit from skilled home OT services in order to maximize occupational performance, safety, and independence in the home environment.      Kishor Simpson Ying Hanley, OT

## 2020-05-20 NOTE — FLOWSHEET NOTE
Dr. Merline Sills arrived to the patient's bedside for evaluation and was updated on the patient's current status via RN. Patient's HR is currently in the 120-130's. Patient denies chest pain and palpitations. New orders received for cardizem push 10 mg iv once and p.o. cardizem and vasotec, incentive spirometer, and pulmonary consult. Will continue to monitor closely.

## 2020-05-20 NOTE — CONSULTS
VASCULAR SURGERY   CONSULT        Name: Yuniel Brooks  MRN: [id number]     Acct: [de-identified]  Room: 2034/2034-01    Admit Date: 5/19/2020  PCP: Rober Oliveira MD    Physician Requesting Consult: LREOY Olmos    Reason for Consult: Left subclavian DVT/lung carcinoma    Chief Complaint:     Chief Complaint   Patient presents with    Arm Swelling         History Obtained From:     patient, electronic medical record    History of Present Illness:      Yuniel Brooks is a  77 y.o.  male who presents with Arm Swelling  Found to have a left subclavian DVT. He has lung carcinoma with a large left neck mass likely impinging on the left-sided venous outflow. He denies amaurosis fugax, lateralizing symptoms or claudication. He recently had a right-sided port placed. Prior he had been started on Eliquis for atrial fibrillation a few weeks ago however has not been taking on a regular basis. Denies any family history of aortic aneurysm. 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  Authorizing Provider   benzonatate (TESSALON) 200 MG capsule Take 1 capsule by mouth 3 times daily as needed for Cough 5/15/20   Rober Oliveira MD   omeprazole (PRILOSEC) 20 MG delayed release capsule Take 1 capsule by mouth daily 5/11/20   Dougie Otero MD   ondansetron (ZOFRAN ODT) 8 MG TBDP disintegrating tablet Place 1 tablet under the tongue every 8 hours as needed for Nausea or Vomiting 5/11/20   Anoop Otero MD   lidocaine-prilocaine (EMLA) 2.5-2.5 % cream Apply topically as needed. 5/11/20   Anoop Otero MD   dilTIAZem (CARDIZEM CD) 240 MG extended release capsule Take 1 capsule by mouth daily 5/9/20   Jose Cruz Blevins MD   furosemide (LASIX) 20 MG tablet Take 1 tablet by mouth daily 5/9/20   Jose Cruz Blevins MD   Elastic Bandages & Supports (JOBST KNEE HIGH COMPRESSION SM) MISC 1 each by Does not apply route daily as needed (lower ext edema) 5/8/20   Jose Cruz Blevins MD   apixaban (ELIQUIS) 5 MG TABS tablet Take 1 tablet by mouth 2 times daily 5/8/20 6/7/20  Jose Cruz Blevins MD   enalapril (VASOTEC) 20 MG tablet TAKE 1 TABLET DAILY 2/14/20   Gerhard Kaplan MD   metoprolol succinate (TOPROL XL) 100 MG extended release tablet TAKE 1 TABLET DAILY 2/14/20   Gerhard Kaplan MD   atorvastatin (LIPITOR) 20 MG tablet TAKE 1 TABLET BY MOUTH ONE TIME A DAY 2/14/20   Gerhard Kaplan MD        Allergies:       Patient has no known allergies. Social History:     Tobacco:    reports that he quit smoking about 2 weeks ago. His smoking use included cigars and cigarettes. He has a 50.00 pack-year smoking history. He has never used smokeless tobacco.  Alcohol:      reports previous alcohol use. Drug Use:  reports no history of drug use.     Family History:     Family History   Problem Relation Age of Onset    Hemochromatosis Sister     Heart Disease Mother     Heart Disease Father        Review of Systems:     Positive and Negative as described in HPI    Constitutional:  negative for  fevers, chills, sweats, fatigue, and weight loss  HEENT:  negative for vision or hearing changes,   Respiratory:  negative for shortness of breath, cough, or congestion  Cardiovascular:  negative for  chest pain, palpitations  Gastrointestinal:  negative for nausea, vomiting, diarrhea, constipation, abdominal pain  Genitourinary:  negative for frequency, dysuria  Integument/Breast:  negative for rash, skin lesions  Musculoskeletal:  negative for muscle aches or joint pain  Neurological:  negative for headaches, dizziness, lightheadedness, numbness, pain and tingling extremities  Behavior/Psych:  negative for depression and anxiety    Code Status:  Full Code    Physical Exam:     Vitals:  /73   Pulse 132   Temp 97.1 °F (36.2 °C) (Temporal)   Resp 18   Ht 6' 2\" (1.88 m)   Wt 286 lb 6.4 oz (129.9 kg)   SpO2 95%   BMI 36.77 kg/m²   Temp (24hrs), Av.8 °F (36.6 °C), Min:96.7 °F (35.9 °C), Max:98.8 °F (37.1 °C)      General appearance - alert, well appearing and in no acute distress  Mental status - oriented to person, place and time with normal affect  Head - normocephalic and atraumatic  Eyes - pupils equal and reactive, extraocular eye movements intact, conjunctiva clear  Ears - hearing appears to be intact  Nose - no drainage noted  Mouth - mucous membranes moist  Neck - supple, no carotid bruits, large left-sided neck/supraclavicular mass  Chest - clear to auscultation, normal effort  Heart - normal rate, regular rhythm, no murmurs  Abdomen - soft, obese, non-tender, non-distended, bowel sounds present all four quadrants, no masses, hepatomegaly, splenomegaly or aortic enlargement  Neurological - normal speech, no focal findings or movement disorder noted, cranial nerves II through XII grossly intact  Extremities - peripheral pulses palpable, mild/moderate left arm and bilateral lower extremity swelling  Skin - no gross lesions, rashes, or induration noted        Data:     Hematology:  Recent Labs     20  1145 20  1557 20  0430   WBC  --  14.6*  --    RBC  --  4.40  --    HGB  --  13.0  --    HCT  --  41.7  --    MCV  --  94.8  --    MCH  --  29.5  --    MCHC  --  31.2  --    RDW  --  14.6* --     220  --    MPV  --  11.6  --    SEGS  --  89*  --    LYMPHOPCT  --  8*  --    MONOPCT  --  3  --    EOSRELPCT  --  0*  --    BASOPCT  --  0  --    PROTIME 16.5* 17.2* 17.1*   APTT 32.6 31.9  --    INR 1.4 1.4 1.4     Chemistry:  Recent Labs     05/19/20  1557 05/20/20  0430    140   K 4.1 4.4   CL 99 98   CO2 26 29   GLUCOSE 165* 124*   BUN 30* 34*   CREATININE 0.77 0.86   MG  --  2.3   ANIONGAP 16 13   LABGLOM >60 >60   GFRAA >60 >60   CALCIUM 9.6 9.6     Recent Labs     05/19/20  1557 05/20/20  0430   PROT 6.6 6.2*   LABALBU 3.1* 2.9*   TSH  --  0.79   AST 49* 52*   ALT 19 18   ALKPHOS 63 59   BILITOT 0.65 0.71     Glucose:No results for input(s): LABA1C, POCGLU, LABINSU in the last 72 hours. Assessment:     Primary Problem  Atrial fibrillation with RVR ([address]  3 60-year-old male with acute left subclavian DVT secondary to lung carcinoma/left neck mass  2. Atrial fibrillation    Active Hospital Problems    Diagnosis Date Noted    Acute respiratory failure with hypoxia ([address] [J96.01] 05/20/2020    Atrial fibrillation with RVR ([facility] [I48.91] 05/19/2020    Small cell lung cancer (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  Eliquis 10 mg p.o. twice daily for 1 week then 5 mg p.o. twice daily thereafter which he will need to be on long-term      Electronically signed by Karen Garcia MD on 5/20/2020 at 4:18 PM     Copy sent to Dr. Migel Tucker MD

## 2020-05-21 PROBLEM — J96.01 ACUTE RESPIRATORY FAILURE WITH HYPOXIA (HCC): Status: ACTIVE | Noted: 2020-01-01

## 2020-05-21 PROBLEM — J18.9 PNEUMONIA OF LEFT LOWER LOBE DUE TO INFECTIOUS ORGANISM: Status: ACTIVE | Noted: 2020-01-01

## 2020-05-21 NOTE — CARE COORDINATION
Case Management Initial Discharge Plan  Rochelle Zakia,         Readmission Risk              Risk of Unplanned Readmission:        19            Met with:spouse/SO to discuss discharge plans. Information verified: address, contacts, phone number, , insurance Yes  PCP: Gerhard Kaplan MD  Date of last visit: 5/15    Insurance Provider: medicare and Orlie Cota     Discharge Planning  Current Residence:  Private home   Living Arrangements:  Spouse/Significant Other         Home has 2 stories/3 stairs to climb to enter the home. Bed and bath are upstairs. Support Systems:  Family Members, Spouse/Significant Other         Current Services PTA:  None   Agency: none         Patient able to perform ADL's:Independent  DME in home:  Cane and walker   DME used to aid ambulation prior to admission:   None   DME used during admission:  None      Potential Assistance Needed:  [facility]      Pharmacy: [facility]    Potential Assistance Purchasing Medications:  No  Does patient want to participate in local refill/ meds to beds program?  No     Patient agreeable to home care:yes  [place] of choice provided:  yes    The Plan for Transition of Care is related to the following treatment goals: skilled RN assessment     The Patient and/or patient representative andrews castro  was provided with a choice of provider and agrees   with the discharge plan. [x] Yes [] No    Freedom of choice list was provided with basic dialogue that supports the patient's individualized plan of care/goals, treatment preferences and shares the quality data associated with the providers.  [x] Yes [] No        Type of Home Care Services:  skilled RN assessment   Patient expects to be discharged to:   home      Prior SNF/Rehab Placement and Facility: none   Agreeable to SNF/Rehab: No  [place] of choice provided: n/a   Evaluation: no     Expected Discharge date:  20  Follow Up Appointment: Best Day/ Time:  any      Transportation provider: per family  Transportation arrangements needed for discharge: No     Discharge Plan:   Patient lives at home with wife, Pamela Bryson and did call to complete an assessment . Per wife patient is independent but very stubborn. He is an active  and uses no DME. He does have walker and cane if needed. Wife is requesting home care be set up and referral to [facility]. Patient recent admission 5/4-5/8 for a fib and eliquis was prescribed for home. He was sent home with free starter pack which wife states he has not completed as of yet. On this admission he was also  found  to have a DVT to left upper arm Will now need starter pack prescribed on dc . His co pay per my old notes is 50 per month. Patient is on 02 at 4 liters and will need to have home 02 evaluation prior to dc. [facility] referral to michael maldonado per wife request and notified aaron ESCALANTE in epic and updated RN     Enmanuel Zuluaga from palliative care is also following. CARD   IMPRESSION:  1. Atrial fibrillation with rapid ventricular response. 2.  Acute left upper extremity deep venous thrombosis. 3.  Malignant left lung cancer reportedly small cell which is metastatic    PLAN:    Start the patient on IV diltiazem for ventricular rate control. I discussed options of conversion to sinus rhythm. At this point, we  will start the patient on Tikosyn 500 mcg p.o. b.i.d.  I discussed all  potential side effects of Tikosyn therapy      PULM  Assessment   · Acute hypoxic respiratory failure  · Metastatic/extended small cell lung cancer, recently diagnosed   · Left basilar infiltrate/atelectasis,? postobstructive pneumonia  · Probable COPD   Recommendations   · Oxygen via nasal cannula at 2 to 4 L, wean as tolerated  · Home oxygen evaluation prior to discharge  · Xopenex Q 4 hours prn  · Start Symbicort  · Start Zosyn    HEM/[facility]  Small cell lung cancer.   Multiple foci of FDG uptake on CT PET without corresponding lesion on CT scan possibly bone metastasis   RECOMMENDATIONS:  1. I personally reviewed results of lab work-up imaging studies and other relevant clinical data. 2. I discussed natural history of small cell lung cancer which is an aggressive cancer. Discussed treatment options. I also discussed patient's case in person with palliative care team.  Patient at this point wants to proceed with further treatment. 3. I reviewed results of CT PET and possible bone metastasis. 4. I will obtain MRI of brain. He was scheduled to get MRI as an outpatient. 5. Patient is currently restarted on anticoagulation. 6. Patient upper extremity DVT occurred while patient was off anticoagulation for couple of days so this is not considered failure of Eliquis. Okay to go back to Eliquis at time of discharge from medical oncology standpoint  7. Patient scheduled for chemotherapy this Wednesday.       Electronically signed by Lorenza Gonzalez RN on 5/21/20 at 8:55 AM EDT

## 2020-05-21 NOTE — CARE COORDINATION
nHpredict Inpatient Visit    Patient/family seen: No: writer offsite     Provided patient/family with copy of nHpredict tool: No: writer offsite      All questions answered at the time of visit. Informed patient/family that the nHpredict is a tool, to be used as a guide, and should not alter their currently established discharge plan. Current discharge plan: home with home care. Pt is independent and is active      Collaboration completed with case management: Yes      Attached please find the nH Predict Outcome report for [facility]  1945. Equal gains anticipated with HH/SNF, no tentative dc plan. CGA with mobility, min-mod with dressing/bathing. Will continue to follow for possible post-acute needs.       Thank you,          Tyler Aceves RN [facility]  Centralized Care Coordinator  O: [phone]  M: [phone]

## 2020-05-21 NOTE — PROGRESS NOTES
ANTIMICROBIAL STEWARDSHIP  Upon review of the patient's chart, the committee has the following recommendation for your consideration:    Indication: Pneumonia  Day of Antimicrobial Therapy: 2 (received one IV dose yesterday)    Recommendation:  DC levofloxacin and monitor off antibiotics. Procalcitonin does not support bacterial pneumonia? If antibiotics to continue, switch to PO levofloxacin. afebrile >48 hours. .  Recent Labs     05/19/20  1557   WBC 14.6*     Recent Labs     05/20/20  0430   PROCAL 0.09*       Unnecessary or inappropriate antimicrobial use increases the risk of microbial resistance and drug-associated toxicities including C. difficile infection. Thank you. Gerald Benavidez, PharmD  5/21/2020  8:10 AM    The Antimicrobial Stewardship Committee at [facility] is led by  Keyona Ramos MD, Infectious Diseases Section Chair.

## 2020-05-21 NOTE — PROGRESS NOTES
[facility]    Progress Note    5/21/2020    09:29 AM   Name:   Elda Cobos  MRN:     [id number]     Acct:      [de-identified]   Room:   2034/2034-01   Day:  2  Admit Date:  5/19/2020  3:27 PM    PCP:   Lilliam Sears MD  Code Status:  Full Code    Subjective:     C/C:   Chief Complaint   Patient presents with    Arm Swelling     Interval History Status: improved   Patient is up in chair on 4L nasal cannula. He is much less short of breath today. HR controlled. Cardizem gtt discontinued yesterday due to low BP. Brief History:   Per my partner   \"The patient is a 77 y.o.  male who presents with Arm Swelling and he is admitted to the hospital for the management of  Atrial fibrillation with RVR ([address] Patient was in IR for PORT placement and was found to have Left upper ext swelling . Doppler showed left subclavian DVT . patient was also found to have tachycadia and was sent to ED. Patient had tachycardia with Afib RVR . He is feeling tired but denies any dyspnea. Evaluation showed leucocytosis. \"  Review of Systems:     Constitutional:  negative for chills, fevers, sweats  Respiratory:  positive for cough and dyspnea on exertion, negative for wheezing   Cardiovascular:  negative for chest pain, chest pressure/discomfort, lower extremity edema, palpitations  Gastrointestinal:  negative for abdominal pain, constipation, diarrhea, nausea, vomiting  Neurological:  negative for dizziness, headache    Medications:      Allergies:  No Known Allergies    Current Meds:   Scheduled Meds:    [START ON 5/22/2020] dofetilide  250 mcg Oral 2 times per day    dofetilide  500 mcg Oral 2 times per day    [START ON 5/22/2020] enalapril  10 mg Oral Daily    dilTIAZem  240 mg Oral Daily    budesonide-formoterol  2 puff Inhalation BID    levofloxacin  750 mg Intravenous Q24H    apixaban  10 mg Oral BID    [START ON 5/28/2020] apixaban  5 mg Oral BID    pantoprazole  40 mg Oral QAM AC    atorvastatin  20 mg Oral Daily    metoprolol succinate  100 mg Oral Daily    furosemide  20 mg Oral Daily    sodium chloride flush  10 mL Intravenous 2 times per day    aspirin  81 mg Oral Daily     Continuous Infusions:    sodium chloride 75 mL/hr at 20 2231     PRN Meds: levalbuterol, sodium chloride nebulizer, sodium chloride flush, acetaminophen **OR** acetaminophen, polyethylene glycol, promethazine **OR** ondansetron, nicotine    Data:     Past Medical History:   has a past medical history of Atrial fibrillation with RVR ([facility] Cancer ([facility] COPD (chronic obstructive pulmonary disease) ([facility] Hx of blood clots, Hyperlipidemia, Hypertension, IGT (impaired glucose tolerance), Kidney stones, Lung mass, 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 Social History:   reports that he quit smoking about 2 weeks ago. His smoking use included cigars and cigarettes. He has a 50.00 pack-year smoking history. He has never used smokeless tobacco. He reports previous alcohol use. He reports that he does not use drugs. Family History:   Family History   Problem Relation Age of Onset    Hemochromatosis Sister     Heart Disease Mother     Heart Disease Father        Vitals:  BP (!) 92/49   Pulse 77   Temp 96.9 °F (36.1 °C) (Temporal)   Resp 18   Ht 6' 2\" (1.88 m)   Wt 286 lb 6.4 oz (129.9 kg)   SpO2 96%   BMI 36.77 kg/m²   Temp (24hrs), Av.2 °F (36.2 °C), Min:96.9 °F (36.1 °C), Max:97.3 °F (36.3 °C)    No results for input(s): POCGLU in the last 72 hours. I/O (24Hr):     Intake/Output Summary (Last 24 hours) at 2020 1200  Last data filed at 2020 2300  Gross per 24 hour   Intake 440 ml   Output --   Net 440 ml       Labs:  Hematology:  Recent Labs     20  1145 20  1557 20  0430   WBC  --  14.6*  --    RBC  --  4.40  --    HGB  --  13.0  --    HCT  -- 41. 7  --    MCV  --  94.8  --    MCH  --  29.5  --    MCHC  --  31.2  --    RDW  --  14.6*  --     220  --    MPV  --  11.6  --    INR 1.4 1.4 1.4     Chemistry:  Recent Labs     05/19/20  1557 05/20/20  0430 05/21/20  0450    140 140   K 4.1 4.4 4.1   CL 99 98 98   CO2 26 29 29   GLUCOSE 165* 124* 120*   BUN 30* 34* 32*   CREATININE 0.77 0.86 0.83   MG  --  2.3 2.4   ANIONGAP 16 13 13   LABGLOM >60 >60 >60   GFRAA >60 >60 >60   CALCIUM 9.6 9.6 9.2     Recent Labs     05/19/20  1557 05/20/20  0430 05/21/20  0450   PROT 6.6 6.2* 5.7*   LABALBU 3.1* 2.9* 2.7*   TSH  --  0.79  --    AST 49* 52* 51*   ALT 19 18 16   ALKPHOS 63 59 55   BILITOT 0.65 0.71 0.71     ABG:No results found for: POCPH, PHART, PH, POCPCO2, SPQ6XQJ, PCO2, POCPO2, PO2ART, PO2, POCHCO3, CKD0XCH, HCO3, NBEA, PBEA, BEART, BE, THGBART, THB, HPT0JIA, CELH7BAB, M5HFTAZX, O2SAT, FIO2  No results found for: SPECIAL  No results found for: CULTURE    Radiology:  Xr Chest Portable    Result Date: 5/19/2020  Progressive opacification of the left hemithorax. Port catheter tip in the SVC with no pneumothorax. Ir Port Placement > 5 Years    Result Date: 5/19/2020  Successful ultrasound and fluoroscopy guided right internal jugular vein 8 Urdu power injectable Port-A-Cath placement. Ready for use. Pet Ct Skull Base To Mid Thigh    Result Date: 5/14/2020  1. Widespread markedly metabolically active neoplastic disease involving the left supraclavicular lymph node, conglomerate mediastinal lymph nodes and left pulmonary opacities. Multifocal metabolic abnormalities throughout the skeleton without defined lytic or blastic lesions. 2.  Incidental findings include loculated pleural fluid in the superior aspect of the left major fissure. Cholelithiasis and bilateral nephrolithiasis. 6 mm stone is present in the left UPJ without evidence for complete obstruction.  3.  Focal areas of metabolic activity in the maxilla and mandible favored to be related to dental disease. Physical Examination:        General appearance:  alert, cooperative and no distress  Mental Status:  oriented to person, place and time and normal affect  Lungs: crackles to bilateral bases, lung sounds diminished. dyspnea with exertion  Heart:  regular rate and rhythm, no murmur  Abdomen:  soft, nontender, nondistended, normal bowel sounds, no masses, hepatomegaly, splenomegaly  Extremities:  no edema, redness, tenderness in the calves  Skin:  no gross lesions, rashes, induration    Assessment:        Hospital Problems           Last Modified POA    * (Principal) Atrial fibrillation with RVR ([address] 5/19/2020 Yes    Essential hypertension 5/19/2020 Yes    Hypertriglyceridemia (Chronic) 5/19/2020 Yes    Tobacco abuse (Chronic) 5/19/2020 Yes    Small cell lung cancer ([address] 5/19/2020 Yes    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 Afib w/RVR: cardizem gtt stopped, Tikosyn started 5/20 per cardiology, currently in NSR   2. Acute respiratory failure(improving) and atelectasis due to metastatic lung cancer: currently on 4L nasal cannula, does not wear home oxygen. Pulmonary consult reviewed, appreciate recommendations. Wean oxygen as tolerated. Home O2 eval ordered prior to discharge. Continue aerosols, Bipap while asleep   3. Monitor intake and output  4. Cardiac monitoring    5. Continue IV Levaquin for possible postobstructive Pneumonia, no sepsis suspected   6. Monitor and control blood pressure, Bp's still on the lower side, decrease enalapril dose to 10mg daily   7. LUE acute DVT; Eliquis dose adjusted and resumed per vascular recommendations, Lovenox discontinued   8. Continue PT/OT   9.  Discharge planning in the next 24 hours if patient continues to improve   10.  Plan discussed with patient and staff     LEROY Miranda NP  5/21/2020  12:00 PM

## 2020-05-21 NOTE — DISCHARGE INSTR - COC
Continuity of Care Form    Patient Name: Talon Hernadez   :  [birth year]  MRN:  [id number]    Admit date:  2020  Discharge date:  2020    Code Status Order: Full Code   Advance Directives:     Admitting Physician:  Sheryl Lemus MD  PCP: Jere Hogue MD    Discharging Nurse: [facility] Unit/Room#: 2034/2034-01  Discharging Unit Phone Number: [phone]    Emergency Contact:   Extended Emergency Contact Information  Primary Emergency Contact: Kareen Garcia  Address: [address] [place] [address] Phone: [phone]  Work Phone: [phone]  Mobile Phone: [phone]  Relation: Spouse    Past Surgical History:  Past Surgical History:   Procedure Laterality Date    TUNNELED VENOUS PORT PLACEMENT  2020    IR PORT PLACEMENT EQUAL OR GREATER THAN 5 YEARS       Immunization History:   Immunization History   Administered Date(s) Administered    Pneumococcal Conjugate 13-valent (Lzikwth06) 2019    Pneumococcal Polysaccharide (Fygqwwzar14) 2019    Td, unspecified formulation 2002       Active Problems:  Patient Active Problem List   Diagnosis Code    Essential hypertension I10    IGT (impaired glucose tolerance) R73.02    Hypertriglyceridemia E78.1    Tobacco abuse Z72.0    Pure hypercholesterolemia E78.00    New onset a-fib ([facility] I48.91    Dyslipidemia E78.5    Mass of thoracic structure R22.2    Class 2 severe obesity due to excess calories with serious comorbidity in adult ([facility] E66.01    Lymphadenopathy R59.1    Small cell lung cancer ([facility] C34.90    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 (HCC) C77.1    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 (HCC) C77.0    Atrial fibrillation with RVR (HCC) I48.91    Acute respiratory failure with hypoxia (HCC) J96.01       Isolation/Infection:   Isolation          No Isolation        Patient Infection Status     Infection Onset Added Last Indicated Last Indicated By Review Planned Expiration Resolved Resolved By    None active    Resolved    COVID-19 Rule Out 05/16/20 05/16/20 05/16/20 COVID-19 (Ordered)   05/17/20 Rule-Out Test Resulted    COVID-19 Rule Out 05/04/20 05/04/20 05/05/20 COVID-19 (Ordered)   05/05/20 Rule-Out Test Resulted          Nurse Assessment:  Last Vital Signs: BP (!) 96/57   Pulse 74   Temp 96.9 °F (36.1 °C) (Temporal)   Resp 18   Ht 6' 2\" (1.88 m)   Wt 286 lb 6.4 oz (129.9 kg)   SpO2 94%   BMI 36.77 kg/m²     Last documented pain score (0-10 scale): Pain Level: 0  Last Weight:   Wt Readings from Last 1 Encounters:   05/21/20 286 lb 6.4 oz (129.9 kg)     Mental Status:  oriented and alert    IV Access:  - hussain-cath right upper chest    Nursing Mobility/ADLs:  Walking   Independent  Transfer  Independent  Bathing  Assisted  Dressing  Assisted  Toileting  Independent  Feeding  [address]  Assisted  Med Delivery   whole    Wound Care Documentation and Therapy:        Elimination:  Continence:   · Bowel: Yes  · Bladder: Yes  Urinary Catheter: None   Colostomy/Ileostomy/Ileal Conduit: No       Date of Last BM: 5/22/2020    Intake/Output Summary (Last 24 hours) at 5/21/2020 0854  Last data filed at 5/20/2020 2300  Gross per 24 hour   Intake 560 ml   Output --   Net 560 ml     I/O last 3 completed shifts: In: 12 [P.O.:560]  Out: -     Safety Concerns: At Risk for Falls    Impairments/Disabilities:      Vision    Nutrition Therapy:  Current Nutrition Therapy:   - Oral Diet:  Cardiac    Routes of Feeding: Oral  Liquids: No Restrictions  Daily Fluid Restriction: no  Last Modified Barium Swallow with Video (Video Swallowing Test): not done    Treatments at the Time of Hospital Discharge:   Respiratory Treatments: na   Oxygen Therapy:  is not on home oxygen therapy.   Ventilator:    - No ventilator support    Rehab Therapies: Physical Therapy and Occupational Therapy  Weight Bearing Status/Restrictions: No weight bearing restirctions  Other Medical Equipment (for information only, NOT a DME order):  cane and walker  Other Treatments:   1. Skilled RN assessment   2. Medication reconciliation     Patient's personal belongings (please select all that are sent with patient):  Glasses      RN SIGNATURE:  Electronically signed by Maryellen Tinoco RN on 5/21/20 at 8:55 AM EDT    CASE MANAGEMENT/SOCIAL WORK SECTION    Inpatient Status Date: 5/22    Readmission Risk Assessment Score:  Readmission Risk              Risk of Unplanned Readmission:        19           Discharging to Facility/ Agency   · Name: [facility]   · Phone: [phone]  · Fax: [phone]    Dialysis Facility (if applicable)   · Name:  · Address:  · Dialysis Schedule:  · Phone:  · Fax:    / signature: Electronically signed by Maryellen Tinoco RN on 5/21/20 at 8:54 AM EDT    PHYSICIAN SECTION    Prognosis: Fair    Condition at Discharge: Stable    Rehab Potential (if transferring to Rehab): Fair    Recommended Labs or Other Treatments After Discharge: PT/OT evaluation and treatment     Physician Certification: I certify the above information and transfer of Sergio Queen  is necessary for the continuing treatment of the diagnosis listed and that he requires [address] for greater 30 days.      Update Admission H&P: No change in H&P    PHYSICIAN SIGNATURE:  Electronically signed by Juan José Castro MD on 5/22/20 at 10:54 AM EDT

## 2020-05-21 NOTE — PROGRESS NOTES
Physical Therapy  Facility/Department: [facility]  Daily Treatment Note  NAME: Preeti Broderick  : 1945  MRN: [id number]    Date of Service: 2020    Discharge Recommendations:  Home with assist PRN, Home with Home health PT        Assessment   Body structures, Functions, Activity limitations: Decreased balance;Decreased endurance  Assessment: progressing toward goals  Activity Tolerance  Activity Tolerance: Patient limited by endurance     Patient Diagnosis(es): The primary encounter diagnosis was Hypoxia. Diagnoses of Small cell lung cancer ([facility] Atrial fibrillation with RVR ([facility] and Acute deep vein thrombosis (DVT) of left upper extremity, unspecified vein ([facility] were also pertinent to this visit. 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 has a past surgical history that includes Tunneled venous port placement (2020).     Restrictions  Restrictions/Precautions  Restrictions/Precautions: General Precautions, Fall Risk  Required Braces or Orthoses?: No  Subjective   General  Chart Reviewed: Yes  General Comment  Comments: OK for PT per Nikki/Omid RN's  Pain Screening  Patient Currently in Pain: Denies  Pain Assessment  Pain Assessment: 0-10  Vital Signs  Patient Currently in Pain: Denies       Orientation     Cognition      Objective      Transfers  Sit to Stand: Contact guard assistance  Stand to sit: Contact guard assistance  Stand Pivot Transfers: Contact guard assistance  Ambulation  Ambulation?: Yes  Ambulation 1  Surface: level tile  Device: No Device  Other Apparatus: O2(2L)  Assistance: Contact guard assistance  Quality of Gait: slow, steady   Gait Deviations: Decreased step length; Slow Faby  Distance: 50 ft  Comments: pt tolerated amb. well oxygen saturation 90 percent after amb. Balance  Posture: Good  Sitting - Static: Good  Sitting - Dynamic: Good  Standing - Static: Good  Standing - Dynamic: Good;-  Exercises  Comments: pt declined ex, states he recently completed ther ex with OT          Comment: assisted pt back to bedside chair               G-Code     OutComes Score                                                     AM-PAC Score  AM-PAC Inpatient Mobility Raw Score : 18 (05/21/20 1556)  AM-PAC Inpatient T-Scale Score : 43.63 (05/21/20 1556)  Mobility Inpatient CMS 0-100% Score: 46.58 (05/21/20 1556)  Mobility Inpatient CMS G-Code Modifier : CK (05/21/20 1556)          Goals  Short term goals  Time Frame for Short term goals: 8 treatments  Short term goal 1: Independent transfers  Short term goal 2: Independent ambulation 200' x 1  Short term goal 3: Tolerate 30 min ther act  Short term goal 4: Good dynamic standing balance  Short term goal 5:  Independently ascend/descend 12 steps w/ 1 HR  Patient Goals   Patient goals : Be able to go home    Plan    Plan  Times per week: 1-2x/day,6-7 days/week  Current Treatment Recommendations: Transfer Training, Gait Training, Stair training, Balance Training, Endurance Training  Safety Devices  Type of devices: Gait belt, Left in chair, Chair alarm in place, Call light within reach  Restraints  Initially in place: No     Therapy Time   Individual Concurrent Group Co-treatment   Time In  1349         Time Out  1401         Minutes  12                 [address], [place]

## 2020-05-21 NOTE — PROGRESS NOTES
Pulmonary Critical Care Progress Note  Russell Edwards MD     Patient seen for the follow up of Acute hypoxic respiratory failure, extended stage small cell lung cancer, left basilar infiltrate, history of smoking, Atrial fibrillation with RVR (HCC)     Subjective:  No significant overnight events. He denies any chest pain. His cough is getting more productive now. His shortness of breath getting better. Examination:  Vitals: BP (!) 92/49   Pulse 77   Temp 96.9 °F (36.1 °C) (Temporal)   Resp 18   Ht 6' 2\" (1.88 m)   Wt 286 lb 6.4 oz (129.9 kg)   SpO2 96%   BMI 36.77 kg/m²   General appearance:  alert and cooperative with exam  Neck: No JVD  Lungs: Bibasilar crackles more on the left. No wheezing  Heart: regular rate and rhythm, S1, S2 normal, no gallop  Abdomen: Soft, non tender, + BS  Extremities: no cyanosis or clubbing. No significant edema    LABs:  CBC:   Recent Labs     05/19/20  1145 05/19/20  1557   WBC  --  14.6*   HGB  --  13.0   HCT  --  41.7    220     BMP:   Recent Labs     05/20/20  0430 05/21/20  0450    140   K 4.4 4.1   CO2 29 29   BUN 34* 32*   CREATININE 0.86 0.83   LABGLOM >60 >60   GLUCOSE 124* 120*     PT/INR:   Recent Labs     05/19/20  1557 05/20/20  0430   PROTIME 17.2* 17.1*   INR 1.4 1.4     APTT:  Recent Labs     05/19/20  1145 05/19/20  1557   APTT 32.6 31.9     LIVER PROFILE:  Recent Labs     05/20/20  0430 05/21/20  0450   AST 52* 51*   ALT 18 16   LABALBU 2.9* 2.7*     Radiology:  5/19/2020      Impression:  · Acute hypoxic respiratory failure  · Metastatic/extended small cell lung cancer, recently diagnosed   · Left basilar infiltrate/atelectasis,?   postobstructive pneumonia  · 60-pack-year smoking history/Probable COPD   · Suspected obstructive sleep apnea/Obesity  · New onset Afib   · HLD, HTN    Recommendations:  · Oxygen via nasal cannula at 2 to 4 L, wean as tolerated  · Home oxygen evaluation prior to discharge  · Xopenex Q 4 hours prn  · Continue Symbicort  · IV Levaquin  · X-ray chest today  · Labs: CBC and BMP in am  · BiPAP, while asleep and as needed  · Heart rate control per cardiology, off Cardizem drip  · Oncology following  · Vascular surgery consulted  · DVT prophylaxis, on low molecular weight heparin, therapeutic dose   · Recommend sleep study/PFT as an outpatient  · Plan for chemo next week noted  · Discussed with RN  · Will follow with you    Florina Clark MD, [facility]  Pulmonary Critical Care and Sleep Medicine,  [facility]  Cell: [phone]  Office: [phone]

## 2020-05-21 NOTE — FLOWSHEET NOTE
05/21/20 1038   Provider Notification   Reason for Communication Evaluate;New orders   Provider Name Dr. Katya Fisher   Provider Notification Physician   Method of Communication Face to face   Response At bedside   Notification Time 26   Dr Katya Fisher arrived at the bedside for pt update and evaluation via RN. Dr. Katya Fisher ordered Home O2 Evaluation to be performed tomorrow 5/22 inpatient. Vasotec dosage also decreased due to consistent low BP recordings. RN notified provider of need for BORIS to be signed as the plan is to discharge pt to home with homecare.

## 2020-05-21 NOTE — PROGRESS NOTES
Occupational Therapy  Facility/Department: [facility]  Daily Treatment Note  NAME: Kathrin Newsome  : 1945  MRN: [id number]    Date of Service: 2020    Discharge Recommendations:  Home with assist PRN, Home with Home health OT       Assessment   Performance deficits / Impairments: Decreased functional mobility ; Decreased ADL status; Decreased strength;Decreased safe awareness;Decreased balance;Decreased endurance;Decreased posture  Prognosis: Good  OT Education: OT Role;Energy Conservation;Plan of Care;Home Exercise Program;Precautions; ADL Adaptive Strategies;Transfer Training;Equipment; Family Education  Patient Education: HEP and education provided   Barriers to Learning: none  REQUIRES OT FOLLOW UP: Yes  Activity Tolerance  Activity Tolerance: Patient Tolerated treatment well  Safety Devices  Safety Devices in place: Yes  Type of devices: Call light within reach;Nurse notified;Gait belt;Patient at risk for falls; Left in chair         Patient Diagnosis(es): The primary encounter diagnosis was Hypoxia. Diagnoses of Small cell lung cancer ([address] Atrial fibrillation with RVR ([address] and Acute deep vein thrombosis (DVT) of left upper extremity, unspecified vein ([address] were also pertinent to this visit. 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 has a past surgical history that includes Tunneled venous port placement (2020).     Restrictions  Restrictions/Precautions  Restrictions/Precautions: General Precautions, Fall Risk  Required Braces or Orthoses?: No  Subjective   General  Chart Reviewed: Yes  Patient assessed for rehabilitation services?: Yes  Response to previous treatment: Patient with no complaints from previous session  Family / Caregiver Present: No      Orientation  Orientation  Overall Orientation Status: Within Functional Limits  Objective    ADL  Additional Comments: Patient declined any needs this session        Balance  Sitting Balance: Stand by assistance  Standing Balance: Contact guard assistance  Standing Balance  Time: standing tolerance ~1 minute for functional tasks  Comment: no LOB, easily fatigued  Bed mobility  Comment: Patient up in chair upon arrvial  Transfers  Sit to stand: Stand by assistance  Stand to sit: Stand by assistance  Transfer Comments: Verbal cues for hand placment and controlled sit<>stand                       Cognition  Overall Cognitive Status: WFL                    Type of ROM/Therapeutic Exercise  Comment: HEP provided to patient, patient required visual and verbal cues to complete exercises correctly in order to faciliate stregnthening and endurance to promote (I) with ADL's,                     Plan   Plan  Times per week: 4-5x/week, 1-2x/day  Current Treatment Recommendations: Strengthening, Balance Training, Functional Mobility Training, Endurance Training, Equipment Evaluation, Education, & procurement, Patient/Caregiver Education & Training, Self-Care / ADL, Safety Education & Training, Positioning                 Goals  Short term goals  Time Frame for Short term goals: by discharge, pt will  Short term goal 1: demo SBA with ADL transfers with AD/DME as needed and good safety  Short term goal 2: demo SBA with functional mob in room with good safety/pacing and AD as needed  Short term goal 3: demo SBA with toileting routine  Short term goal 4: demo I with UB ADLs and SBA with LB ADLs with AE/DME as needed and with good safety/pacing  Short term goal 5: demo and verb good understanding of fall prevention techs, EC/WS techs, and possible equip needs for home  Patient Goals   Patient goals : to go home       Therapy Time   Individual Concurrent Group Co-treatment   Time In 0770         Time Out 1322         Minutes 24              Upon writer exit, call light within reach, pt retired to chair. All lines intact and patient positioned comfortably. All patient needs addressed prior to ending therapy session. Chart reviewed prior to treatment and patient is agreeable for therapy. RN reports patient is medically stable for therapy treatment this date. Access Code: [email or web address]   URL: [email or web address]   Date: 05/21/2020   Prepared by: Rosy Wolfe     Exercises   Seated Cervical Retraction - 10 reps - 3 sets - 1x daily - 7x weekly   Seated Cervical Rotation AROM - 10 reps - 3 sets - 1x daily - 7x weekly   Sternocleidomastoid Stretch - 10 reps - 3 sets - 1x daily - 7x weekly   Thumb Opposition - 10 reps - 3 sets - 1x daily - 7x weekly   Wrist AROM Flexion Extension - 10 reps - 3 sets - 1x daily - 7x weekly   Seated Elbow Flexion and Extension AROM - 10 reps - 3 sets - 1x daily - 7x weekly   Seated Shoulder External Rotation - 10 reps - 3 sets - 1x daily - 7x weekly   Seated Shoulder Flexion - 10 reps - 3 sets - 1x daily - 7x weekly   Seated Shoulder Flexion Full Range - 10 reps - 3 sets - 1x daily - 7x weekly   Seated Shoulder Abduction - Thumbs Up - 10 reps - 3 sets - 1x daily - 7x weekly   Seated Punches with Trunk Rotation - 10 reps - 3 sets - 1x daily - 7x weekly   Seated Shoulder Abduction with Bent Elbow - 10 reps - 3 sets - 1x daily - 7x weekly   Patient Education   How to Prevent Falls   How to Get Up After a Fall - Modified   How to SHANE Serra/ZULMA

## 2020-05-21 NOTE — PROGRESS NOTES
VASCULAR SURGERY  PROGRESS NOTE      5/21/2020 1:26 PM  Subjective:   Admit Date: 5/19/2020  PCP: Mallika Napier MD    Chief Complaint   Patient presents with    Arm Swelling     Interval History: No complaints. Mild left arm swelling. Diet: DIET CARDIAC;    Medications:   Scheduled Meds:   [START ON 5/22/2020] dofetilide  250 mcg Oral 2 times per day    dofetilide  500 mcg Oral 2 times per day    [START ON 5/22/2020] enalapril  10 mg Oral Daily    dilTIAZem  240 mg Oral Daily    budesonide-formoterol  2 puff Inhalation BID    levofloxacin  750 mg Intravenous Q24H    apixaban  10 mg Oral BID    [START ON 5/28/2020] apixaban  5 mg Oral BID    pantoprazole  40 mg Oral QAM AC    atorvastatin  20 mg Oral Daily    metoprolol succinate  100 mg Oral Daily    furosemide  20 mg Oral Daily    sodium chloride flush  10 mL Intravenous 2 times per day    aspirin  81 mg Oral Daily     Continuous Infusions:   sodium chloride 75 mL/hr at 05/21/20 1253         Labs:   CBC:   Recent Labs     05/19/20  1145 05/19/20  1557   WBC  --  14.6*   HGB  --  13.0    220     BMP:    Recent Labs     05/19/20  1557 05/20/20  0430 05/21/20  0450    140 140   K 4.1 4.4 4.1   CL 99 98 98   CO2 26 29 29   BUN 30* 34* 32*   CREATININE 0.77 0.86 0.83   GLUCOSE 165* 124* 120*     Hepatic:   Recent Labs     05/19/20  1557 05/20/20  0430 05/21/20  0450   AST 49* 52* 51*   ALT 19 18 16   BILITOT 0.65 0.71 0.71   ALKPHOS 63 59 55     Troponin: Invalid input(s): TROPONIN  BNP: No results for input(s): BNP in the last 72 hours. Lipids: No results for input(s): CHOL, HDL in the last 72 hours.     Invalid input(s): LDLCALCU  INR:   Recent Labs     05/19/20  1145 05/19/20  1557 05/20/20  0430   INR 1.4 1.4 1.4       Objective:   Vitals: BP (!) 90/39   Pulse 62   Temp 97 °F (36.1 °C) (Temporal)   Resp 18   Ht 6' 2\" (1.88 m)   Wt 286 lb 6.4 oz (129.9 kg)   SpO2 98%   BMI 36.77 kg/m²   General appearance: alert, cooperative and no distress  Mental Status: oriented to person, place and time with normal affect  Neck: good carotid pulses, no JVD, left neck/supraclavicular mass  Lungs: clear to auscultation bilaterally, normal effort  Heart: regular rate and rhythm, no murmur,  Abdomen: soft, obese, non-tender, non-distended, bowel sounds present all four quadrants, no masses, hepatomegaly, splenomegaly or aortic enlargement  Extremities: Mild left arm swelling, mild bilateral lower extremity edema  Skin: no gross lesions, rashes, or induration    Assessment:   3 60-year-old male with acute left subclavian DVT secondary to lung carcinoma/left neck mass  2. 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 Continue Eliquis  2.  Home when okay with all    Electronically signed by Gayle Javier MD on 5/21/2020 at 1:26 PM

## 2020-05-21 NOTE — PROGRESS NOTES
5/28/2020] apixaban  5 mg Oral BID    pantoprazole  40 mg Oral QAM AC    atorvastatin  20 mg Oral Daily    metoprolol succinate  100 mg Oral Daily    furosemide  20 mg Oral Daily    sodium chloride flush  10 mL Intravenous 2 times per day    aspirin  81 mg Oral Daily       IV Infusions (if any):   sodium chloride 75 mL/hr at 05/20/20 2231       Diagnostics:   Telemetry:SINUS RHYTHM AT 78/MIN. QTc 420 MSEC. Labs:   CBC:  Recent Labs     05/19/20  1145 05/19/20  1557   WBC  --  14.6*   HGB  --  13.0   HCT  --  41.7    220     Magnesium:  Recent Labs     05/20/20  0430 05/21/20  0450   MG 2.3 2.4     BMP:  Recent Labs     05/20/20  0430 05/21/20  0450    140   K 4.4 4.1   CALCIUM 9.6 9.2   CO2 29 29   BUN 34* 32*   CREATININE 0.86 0.83   LABGLOM >60 >60   GLUCOSE 124* 120*     BNP:No results for input(s): BNP, PROBNP in the last 72 hours. PT/INR:  Recent Labs     05/19/20  1557 05/20/20  0430   PROTIME 17.2* 17.1*   INR 1.4 1.4     APTT:  Recent Labs     05/19/20  1145 05/19/20  1557   APTT 32.6 31.9     CARDIAC ENZYMES:No results for input(s): MYOGLOBIN, CKTOTAL, CKMB, CKMBINDEX, TROPHS, TROPONINT in the last 72 hours. FASTING LIPID PANEL:  Lab Results   Component Value Date    HDL 36 05/05/2020    TRIG 108 05/05/2020     LIVER PROFILE:  Recent Labs     05/20/20  0430 05/21/20  0450   AST 52* 51*   ALT 18 16   LABALBU 2.9* 2.7*   ALKPHOS 59 55   BILITOT 0.71 0.71   PROT 6.2* 5.7*   ALBUMIN NOT REPORTED NOT REPORTED        ASSESSMENT:  1. S/P CONVERSION TO SINUS RHYTHM ON TIKOSYN THERAPY. 2. LT UPPER EXTREMITY DVT. 3. METASTATIC LUNG CANCER.     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 CONTINUE TIKOSYN  MCG PO BID. 2. CONTINUE ELIQUIS FOR DVT. Discussed with patient and nursing.     Omer García MD

## 2020-05-21 NOTE — PROGRESS NOTES
Today's Date: 5/21/2020  Patient Name: Autumn Ortega  Date of admission: 5/19/2020  3:27 PM  Patient's age: 77 y.o., 1945  Admission Dx: Atrial fibrillation with RVR ([facility] [address] [I48.91]    Reason for Consult: management recommendations  Requesting Physician: Oanh Rodriges MD    CHIEF COMPLAINT: Left upper extremity swelling. Cough. Shortness of breath. Small cell lung cancer. History Obtained From:  patient, electronic medical record    Interval history:    Patient seen and examined. Labs and vitals reviewed. Patient requiring oxygen now on 3 L. Shortness of breath is stable. Denies any worsening of shortness of breath. Patient on anticoagulation. MRI shows brain mets. Patient denies headaches fever or chills. HISTORY OF PRESENT ILLNESS:      The patient is a 77 y.o.  male who is admitted to the hospital for chief complaints of fatigue. Patient was recently diagnosed with a small cell lung cancer. Patient underwent port placement yesterday. Was noted to have swelling of his left upper extremity. Patient has been on anticoagulation however anticoagulation was held for a couple of days for the port placement. Ultrasound of the upper extremity shows an acute subclavian vein DVT. Patient was subsequently sent to the ER. Further evaluation revealed A. fib with RVR. Patient was then admitted to the hospital.  At time of evaluation patient does complain of being tired but denies any chest pain. According the patient left arm swelling started about 2 days ago. He has also started noticing mass at the base of his neck on the left side.     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 Past Surgical History:   has a past surgical history that includes Tunneled venous port placement (05/19/2020). Medications:    Prior to Admission medications    Medication Sig Start Date End Date Taking? Authorizing Provider   benzonatate (TESSALON) 200 MG capsule Take 1 capsule by mouth 3 times daily as needed for Cough 5/15/20   Toby Peralta MD   omeprazole (PRILOSEC) 20 MG delayed release capsule Take 1 capsule by mouth daily 5/11/20   Shi Otero MD   ondansetron (ZOFRAN ODT) 8 MG TBDP disintegrating tablet Place 1 tablet under the tongue every 8 hours as needed for Nausea or Vomiting 5/11/20   Kenroy Molina MD   lidocaine-prilocaine (EMLA) 2.5-2.5 % cream Apply topically as needed.  5/11/20   Anoop Otero MD   dilTIAZem (CARDIZEM CD) 240 MG extended release capsule Take 1 capsule by mouth daily 5/9/20   Teresa Wan MD   furosemide (LASIX) 20 MG tablet Take 1 tablet by mouth daily 5/9/20   Teresa Wan MD   Elastic Bandages & Supports (JOBST KNEE HIGH COMPRESSION SM) MISC 1 each by Does not apply route daily as needed (lower ext edema) 5/8/20   Teresa Wan MD   apixaban (ELIQUIS) 5 MG TABS tablet Take 1 tablet by mouth 2 times daily 5/8/20 6/7/20  Teresa Wan MD   enalapril (VASOTEC) 20 MG tablet TAKE 1 TABLET DAILY 2/14/20   Toby Peralta MD   metoprolol succinate (TOPROL XL) 100 MG extended release tablet TAKE 1 TABLET DAILY 2/14/20   Toby Peralta MD   atorvastatin (LIPITOR) 20 MG tablet TAKE 1 TABLET BY MOUTH ONE TIME A DAY 2/14/20   Toby Peralta MD     Current Facility-Administered Medications   Medication Dose Route Frequency Provider Last Rate Last Dose    [START ON 5/22/2020] dofetilide (TIKOSYN) capsule 250 mcg  250 mcg Oral 2 times per day Dina Noriega MD        [facility] capsule 500 mcg  500 mcg Oral 2 times per day Dina Noriega MD        [START ON 5/22/2020] enalapril Or    acetaminophen (TYLENOL) suppository 650 mg  650 mg Rectal Q6H PRN Zoya Liban, APRN - NP        polyethylene glycol (GLYCOLAX) packet 17 g  17 g Oral Daily PRN Zoya Liban, APRN - NP        promethazine (PHENERGAN) tablet 12.5 mg  12.5 mg Oral Q6H PRN Zoya Liban, APRN - NP        Or    ondansetron (ZOFRAN) injection 4 mg  4 mg Intravenous Q6H PRN Zoya Liban, APRN - NP        nicotine (NICODERM CQ) 21 MG/24HR 1 patch  1 patch Transdermal Daily PRN Zoya Liban, APRN - NP        aspirin chewable tablet 81 mg  81 mg Oral Daily Trenda [place], APRN - NP   81 mg at 05/21/20 0902     Facility-Administered Medications Ordered in Other Encounters   Medication Dose Route Frequency Provider Last Rate Last Dose    0.9 % sodium chloride infusion   Intravenous Continuous Reva Gilmore MD        0.9 % sodium chloride infusion   Intravenous Continuous Reva Gilmore MD           Allergies:  Patient has no known allergies. Social History:   reports that he quit smoking about 2 weeks ago. His smoking use included cigars and cigarettes. He has a 50.00 pack-year smoking history. He has never used smokeless tobacco. He reports previous alcohol use. He reports that he does not use drugs. Family History: family history includes Heart Disease in his father and mother; Hemochromatosis in his sister. REVIEW OF SYSTEMS:      Constitutional: No fever or chills.  No night sweats, no weight loss   Eyes: No eye discharge, double vision, or eye pain   HEENT: negative for sore mouth, sore throat, hoarseness and voice change   Respiratory: Positive shortness of breath with exertion  Cardiovascular: negative for chest pain, dyspnea, palpitations, orthopnea, PND   Gastrointestinal: negative for nausea, vomiting, diarrhea, constipation, abdominal pain, Dysphagia, hematemesis and hematochezia   Genitourinary: negative for frequency, dysuria, nocturia, urinary incontinence, and hematuria   Integument: negative for rash, skin lesions, bruises.    Hematologic/Lymphatic: negative for easy bruising, bleeding, lymphadenopathy, or petechiae   Endocrine: negative for heat or cold intolerance,weight changes, change in bowel habits and hair loss   Musculoskeletal: negative for myalgias, arthralgias, pain, joint swelling,and bone pain   Neurological: negative for headaches, dizziness, seizures, weakness, numbness    PHYSICAL EXAM:        BP (!) 90/39   Pulse 62   Temp 97.3 °F (36.3 °C) (Temporal)   Resp 18   Ht 6' 2\" (1.88 m)   Wt 286 lb 6.4 oz (129.9 kg)   SpO2 98%   BMI 36.77 kg/m²    Temp (24hrs), Av.2 °F (36.2 °C), Min:96.9 °F (36.1 °C), Max:97.3 °F (36.3 °C)      General appearance - well appearing, no in pain or distress   Mental status - alert and cooperative   Eyes - pupils equal and reactive, extraocular eye movements intact   Ears - bilateral TM's and external ear canals normal   Mouth - mucous membranes moist, pharynx normal without lesions   Neck - supple, no significant adenopathy   Lymphatics - no palpable lymphadenopathy, no hepatosplenomegaly   Chest -decreased breathing sounds more so on the left side  Heart - normal rate, regular rhythm, normal S1, S2, no murmurs  Abdomen - soft, nontender, nondistended, no masses or organomegaly   Neurological - alert, oriented, normal speech, no focal findings or movement disorder noted   Musculoskeletal - no joint tenderness, deformity or swelling   Extremities - peripheral pulses normal, no pedal edema, no clubbing or cyanosis   Skin - normal coloration and turgor, no rashes, no suspicious skin lesions noted ,      DATA:      Labs:     Results for orders placed or performed during the hospital encounter of 20   CBC Auto Differential   Result Value Ref Range    WBC 14.6 (H) 3.5 - 11.3 k/uL    RBC 4.40 4.21 - 5.77 m/uL    Hemoglobin 13.0 13.0 - 17.0 g/dL    Hematocrit 41.7 40.7 - 50.3 %    MCV 94.8 82.6 - 102.9 fL    MCH 29.5 25.2 - 33.5 pg    MCHC 31.2 28.4 - 34.8 g/dL    RDW 14.6 (H) 11.8 - 14.4 %    Platelets 416 862 - 406 k/uL    MPV 11.6 8.1 - 13.5 fL    NRBC Automated 0.0 0.0 per 100 WBC    Differential Type NOT REPORTED     WBC Morphology NOT REPORTED     RBC Morphology NOT REPORTED     Platelet Estimate NOT REPORTED     Seg Neutrophils 89 (H) 36 - 66 %    Lymphocytes 8 (L) 24 - 44 %    Monocytes 3 1 - 7 %    Eosinophils % 0 (L) 1 - 4 %    Basophils 0 %    Immature Granulocytes 0 0 %    Segs Absolute 12.99 (H) 1.8 - 7.7 k/uL    Absolute Lymph # 1.17 1.0 - 4.8 k/uL    Absolute Mono # 0.44 0.2 - 0.8 k/uL    Absolute Eos # 0.00 0.0 - 0.4 k/uL    Basophils Absolute 0.00 0.0 - 0.2 k/uL    Absolute Immature Granulocyte 0.00 0.00 - 0.30 k/uL   Comprehensive Metabolic Panel w/ Reflex to MG   Result Value Ref Range    Glucose 165 (H) 70 - 99 mg/dL    BUN 30 (H) 8 - 23 mg/dL    CREATININE 0.77 0.70 - 1.20 mg/dL    Bun/Cre Ratio 39 (H) 9 - 20    Calcium 9.6 8.6 - 10.4 mg/dL    Sodium 141 135 - 144 mmol/L    Potassium 4.1 3.7 - 5.3 mmol/L    Chloride 99 98 - 107 mmol/L    CO2 26 20 - 31 mmol/L    Anion Gap 16 9 - 17 mmol/L    Alkaline Phosphatase 63 40 - 129 U/L    ALT 19 5 - 41 U/L    AST 49 (H) <40 U/L    Total Bilirubin 0.65 0.3 - 1.2 mg/dL    Total Protein 6.6 6.4 - 8.3 g/dL    Alb 3.1 (L) 3.5 - 5.2 g/dL    Albumin/Globulin Ratio NOT REPORTED 1.0 - 2.5    GFR Non-African American >60 >60 mL/min    GFR African American >60 >60 mL/min    GFR Comment          GFR Staging NOT REPORTED    APTT   Result Value Ref Range    PTT 31.9 23.9 - 33.8 sec   Protime-INR   Result Value Ref Range    Protime 17.2 (H) 11.5 - 14.2 sec    INR 1.4    Comprehensive Metabolic Panel w/ Reflex to MG   Result Value Ref Range    Glucose 124 (H) 70 - 99 mg/dL    BUN 34 (H) 8 - 23 mg/dL    CREATININE 0.86 0.70 - 1.20 mg/dL    Bun/Cre Ratio 40 (H) 9 - 20    Calcium 9.6 8.6 - 10.4 mg/dL    Sodium 140 135 - 144 mmol/L    Potassium 4.4 3.7 - 5.3 mmol/L    Chloride 98 98 - 107 mmol/L    CO2 29 20 - 31 mmol/L    Anion Gap 13 9 - 17 mmol/L    Alkaline Phosphatase 59 40 - 129 U/L    ALT 18 5 - 41 U/L    AST 52 (H) <40 U/L    Total Bilirubin 0.71 0.3 - 1.2 mg/dL    Total Protein 6.2 (L) 6.4 - 8.3 g/dL    Alb 2.9 (L) 3.5 - 5.2 g/dL    Albumin/Globulin Ratio NOT REPORTED 1.0 - 2.5    GFR Non-African American >60 >60 mL/min    GFR African American >60 >60 mL/min    GFR Comment          GFR Staging NOT REPORTED    Magnesium   Result Value Ref Range    Magnesium 2.3 1.6 - 2.6 mg/dL   TSH with Reflex   Result Value Ref Range    TSH 0.79 0.30 - 5.00 mIU/L   Protime-INR   Result Value Ref Range    Protime 17.1 (H) 11.5 - 14.2 sec    INR 1.4    Procalcitonin   Result Value Ref Range    Procalcitonin 0.09 (H) <0.09 ng/mL   Comprehensive Metabolic Panel w/ Reflex to MG   Result Value Ref Range    Glucose 120 (H) 70 - 99 mg/dL    BUN 32 (H) 8 - 23 mg/dL    CREATININE 0.83 0.70 - 1.20 mg/dL    Bun/Cre Ratio 39 (H) 9 - 20    Calcium 9.2 8.6 - 10.4 mg/dL    Sodium 140 135 - 144 mmol/L    Potassium 4.1 3.7 - 5.3 mmol/L    Chloride 98 98 - 107 mmol/L    CO2 29 20 - 31 mmol/L    Anion Gap 13 9 - 17 mmol/L    Alkaline Phosphatase 55 40 - 129 U/L    ALT 16 5 - 41 U/L    AST 51 (H) <40 U/L    Total Bilirubin 0.71 0.3 - 1.2 mg/dL    Total Protein 5.7 (L) 6.4 - 8.3 g/dL    Alb 2.7 (L) 3.5 - 5.2 g/dL    Albumin/Globulin Ratio NOT REPORTED 1.0 - 2.5    GFR Non-African American >60 >60 mL/min    GFR African American >60 >60 mL/min    GFR Comment          GFR Staging NOT REPORTED    Magnesium   Result Value Ref Range    Magnesium 2.4 1.6 - 2.6 mg/dL   EKG 12 Lead   Result Value Ref Range    Ventricular Rate 104 BPM    Atrial Rate 111 BPM    QRS Duration 100 ms    Q-T Interval 400 ms    QTc Calculation (Bazett) 526 ms    R Axis -4 degrees    T Axis -142 degrees   EKG 12 Lead   Result Value Ref Range    Ventricular Rate 92 BPM    Atrial Rate 250 BPM    QRS Duration 98 ms    Q-T Interval 394 ms    QTc Calculation (Bazett) 487 ms    R Axis 4 degrees    T Axis -135 degrees   EKG 12 Lead   Result Value Ref Range    Ventricular Rate 69 BPM    Atrial Rate 69 BPM    P-R Interval 158 ms    QRS Duration 100 ms    Q-T Interval 484 ms    QTc Calculation (Bazett) 518 ms    P Axis 51 degrees    R Axis 9 degrees    T Axis -44 degrees   EKG 12 Lead   Result Value Ref Range    Ventricular Rate 74 BPM    Atrial Rate 74 BPM    P-R Interval 144 ms    QRS Duration 96 ms    Q-T Interval 464 ms    QTc Calculation (Bazett) 515 ms    P Axis 23 degrees    R Axis 14 degrees    T Axis 9 degrees         IMAGING DATA:    Xr Chest Portable    Result Date: 5/19/2020  EXAMINATION: ONE XRAY VIEW OF THE CHEST 5/19/2020 3:56 pm COMPARISON: 05/04/2020. HISTORY: ORDERING SYSTEM PROVIDED HISTORY: sob TECHNOLOGIST PROVIDED HISTORY: sob Reason for Exam: SOB Acuity: Acute Type of Exam: Initial FINDINGS: There is progressive opacification and volume loss within the left lung. Increased opacity in the perihilar region suggest progression of the patient's known malignancy. The right lung is clear. The cardiac silhouette is obscured. Aortic vascular calcification. Right-sided venous access port with tip in the SVC and no pneumothorax. Progressive opacification of the left hemithorax. Port catheter tip in the SVC with no pneumothorax. Xr Chest Portable    Result Date: 5/4/2020  EXAMINATION: ONE XRAY VIEW OF THE CHEST 5/4/2020 3:22 pm COMPARISON: CT chest dated 07/02/2018. No previous chest x-ray. HISTORY: ORDERING SYSTEM PROVIDED HISTORY: Chest Pain TECHNOLOGIST PROVIDED HISTORY: Chest Pain Reason for Exam: Pt states sob x 1 week Acuity: Acute Type of Exam: Initial FINDINGS: Heart size is within normal limits. There is thickening of the right paratracheal stripe. A masslike opacity is seen in the left hilar region/AP window, and there is airspace opacity in the mid and lower left lung.   There is mild elevation of the left hemidiaphragm, which appears to be a new finding. No evidence of pneumothorax. No free air beneath the diaphragm. 1.  Masslike opacity at the left hilum with airspace consolidation in the mid and lower left lung. This is concerning for left hilar malignancy with postobstructive pneumonia or atelectasis. Contrast-enhanced CT chest is recommended. 2.  Thickened right paratracheal stripe, suggesting lymphadenopathy. Ct Chest Pulmonary Embolism W Contrast    Result Date: 5/4/2020  EXAMINATION: CTA OF THE CHEST, 5/4/2020 4:15 pm TECHNIQUE: CTA of the chest was performed after the administration of intravenous contrast.  Multiplanar reformatted images are provided for review. MIP images are provided for review. Dose modulation, iterative reconstruction, and/or weight based adjustment of the mA/kV was utilized to reduce the radiation dose to as low as reasonably achievable. COMPARISON: July 2, 2018 HISTORY: ORDERING SYSTEM PROVIDED HISTORY: Chest Pain r/o PE TECHNOLOGIST PROVIDED HISTORY: Chest Pain r/o PE Reason for Exam: SOB, a-fib x 2 weeks, cough Acuity: Acute Type of Exam: Initial FINDINGS: Chest wall: Mild nonspecific infiltration within the left axilla. No axillary adenopathy. Upper Abdomen: No adrenal nodule. Mild left-sided hydronephrosis with adjacent calculi within the left renal pelvis measuring 9 mm. Cholelithiasis. Mediastinum: No cardiomegaly. No pericardial effusion. Coronary artery calcifications. Massive mediastinal and hilar adenopathy. Right paratracheal adenopathy measures 5.2 cm. Subcarinal adenopathy measures 5.4 cm. Multiple enlarged lymph nodes are seen within the superior mediastinum. Conglomerate left hilar adenopathy causes narrowing of the left main pulmonary artery. Pulmonary Arteries: No central pulmonary embolus. No right-sided pulmonary embolus. Left main pulmonary artery is narrowed by tumor. Questionable chronic nonocclusive left lower lobe pulmonary embolus versus tumor infiltration.  Lungs: Emphysematous changes. Small left pleural effusion. No right pleural effusion. Conglomerate masses within the left lower lobe extending into the left hilum. This measures at least 7.0 cm. Bones: Thoracic spine degenerative changes. No acute central or segmental pulmonary embolus. Questionable left lower lobe chronic nonocclusive pulmonary embolus versus tumor infiltration. Left main pulmonary artery is surrounded by tumor which causes narrowing. Massive mediastinal and left hilar adenopathy. Multiple conglomerate left lower lobe masses. Mild left-sided hydronephrosis with suspected partially obstructing calculi within the left renal pelvis. Ir Port Placement > 5 Years    Result Date: 5/19/2020  PROCEDURE: ULTRASOUND GUIDED VASCULAR ACCESS. FLUOROSCOPY GUIDED PLACEMENT OF A SUBCUTANEOUS PORT-A-CATH. MODERATE CONSCIOUS SEDATION 5/19/2020. HISTORY: ORDERING SYSTEM PROVIDED HISTORY: Malignant neoplasm of left lung, unspecified part of lung (HCC) SEDATION: 1.5 mgversed and 100 micro g fentanyl were titrated intravenously for moderate sedation monitored under my direction. Total intraservice time of sedation was 30 minutes. The patient's vital signs were monitored throughout the procedure and recorded in the patient's medical record by the nurse. FLUOROSCOPY DOSE AND TYPE OR TIME AND EXPOSURES:  mGy cm squared TECHNIQUE: Informed consent was obtained after a detailed explanation of the procedure including risks, benefits, and alternatives. Universal protocol was observed. The right neck and chest were prepped and draped in sterile fashion using maximum sterile barrier technique. Local anesthesia was achieved with lidocaine. A micropuncture needle was used to access the right internal jugular vein using ultrasound guidance. An ultrasound image demonstrating patency of the vein with needle tip located within it. An image was obtained and stored in PACs.   A 0.035 guidewire was used to place a peel-away sheath. A chest wall incision was made and a subcutaneous pocket was created. The port reservoir was placed within the pocket and the port tubing was attached and tunneled subcutaneously to the venotomy site. The port tubing was cut to an appropriate length and advanced through the peel-away sheath under fluoroscopic guidance to the cavo-atrial junction. The chest wall incision was closed using 2-0 Vicryl suture and tissue adhesive was applied to the venotomy site and chest wall incision. The port flushed easily and there was a good blood return. The port was locked with heparinized saline. The patient tolerated the procedure well and there were no immediate complications. FINDINGS: Fluoroscopic image demonstrates the tip of the port at the cavo-atrial junction . Successful ultrasound and fluoroscopy guided right internal jugular vein 8 [place] power injectable Port-A-Cath placement. Ready for use. Ir Biopsy Lymph Node Superficial    Result Date: 5/6/2020  PROCEDURE: ULTRASOUND GUIDED CORE BIOPSY LYMPH NODE 5/6/2020 HISTORY: ORDERING SYSTEM PROVIDED HISTORY: Left supraclavicular LN biopsy TECHNOLOGIST PROVIDED HISTORY: Left supraclavicular LN biopsy PHYSICIANS: Zaynab Maharaj. Rosetta Arevalo MD SEDATION: Local anesthesia TECHNIQUE: Informed consent was obtained after a detailed discussion about the procedure including the risk, benefits, and alternatives. Universal protocol was followed. Axial images were obtained through the palpable and enlarged left supraclavicular lymph node, and a suitable skin site was prepped and draped in sterile fashion. Local anesthesia was achieved with lidocaine. Core biopsy was performed with ultrasound guidance. Four 18 gauge core biopsy specimens and 2 FNAs (25 and 21 gauge needles) were obtained, and the patient tolerated the procedure well. Specimen quality was confirmed by pathology on site.  Dose modulation, iterative reconstruction, and/or weight based adjustment of soft tissue nodule along the surface of the tail the pancreas demonstrates abnormal metabolic activity (SUV max 29). BONES/SOFT TISSUE: Widespread foci of abnormal metabolic abnormality are present throughout the osseous structures without definable lytic or blastic lesions. INCIDENTAL CT FINDINGS: Paraseptal emphysematous disease. Loculated left pleural fluid, as above. Cholelithiasis. 6 mm stone at the left UPJ with excreted radiotracer distal to the stone noted. Mild pelvicaliectasis. Nonobstructing punctate stone in the right kidney and right renal cyst.     1.  Widespread markedly metabolically active neoplastic disease involving the left supraclavicular lymph node, conglomerate mediastinal lymph nodes and left pulmonary opacities. Multifocal metabolic abnormalities throughout the skeleton without defined lytic or blastic lesions. 2.  Incidental findings include loculated pleural fluid in the superior aspect of the left major fissure. Cholelithiasis and bilateral nephrolithiasis. 6 mm stone is present in the left UPJ without evidence for complete obstruction. 3.  Focal areas of metabolic activity in the maxilla and mandible favored to be related to dental disease. Vl Upper Extremity Venous Duplex Left    Result Date: 5/19/2020    [facility]  Vascular Upper Extremities Veins Procedure   Patient Name     Alphonse Breeding Date of Study             05/19/2020                   M   Date of Birth    1945  Gender                    Male   Age              77 year(s)  Race                         Room Number      OPT   Corporate ID #   [id number]   Patient Acct #   [de-identified]   MR #             [id number]     Osito Younger   Accession #      [id number]   Interpreting Physician    Shakira Rocha   Referring Nurse              Referring Physician  Practitioner  Procedure Type of Study:   Veins: Upper Extremities Veins, Venous Scan Upper Left.   Indications for Study:Pain, edema, discoloration. Patient Status:Out Patient. Technical Quality:Adequate visualization.   - Critical Result:pt in PACU. Comments:LUE edema. CA, tumor left chest  Conclusions   Summary   Acute deep vein thrombosis of the left upper extremity involving the  subclavian vein. No evidence of superficial venous thrombosis in the left upper extremity. Signature   ----------------------------------------------------------------  Electronically signed by Wilmer Sharif(Sonographer) on  05/19/2020 02:42 PM  ----------------------------------------------------------------   ----------------------------------------------------------------  Electronically signed by Mich Dang(Interpreting physician)  on 05/19/2020 06:33 PM  ----------------------------------------------------------------    Left Impression:   Left internal jugular, axillary, brachial, ulnar, radial, cephalic and   basilic veins are compressible with normal doppler responses. There is evidence of deep vein thrombosis in the left upper extremity,   the subclavian vein is non-compressible with echogenicity. edema noted. Velocities are measured in cm/s ; Diameters are measured in cm Left UE Vein Measurements 2D Measurements +------------------------------------+----------+---------------+----------+ ! Location                            ! Visualized! Compressibility! Thrombosis! +------------------------------------+----------+---------------+----------+ ! Prox IJV                            ! Yes       ! Yes            ! None      ! +------------------------------------+----------+---------------+----------+ ! Dist IJV                            ! Yes       ! Yes            ! None      ! +------------------------------------+----------+---------------+----------+ ! Prox SCV                            ! Yes       ! No             !Acute     ! +------------------------------------+----------+---------------+----------+ ! Dist SCV !Yes       !No             !Acute     ! +------------------------------------+----------+---------------+----------+ ! Innominate                          ! Yes       ! Yes            ! None      ! +------------------------------------+----------+---------------+----------+ ! Prox Axillary                       ! Yes       ! Yes            ! None      ! +------------------------------------+----------+---------------+----------+ ! Dist Axillary                       ! Yes       ! Yes            ! None      ! +------------------------------------+----------+---------------+----------+ ! Prox Brachial                       !Yes       ! Yes            ! None      ! +------------------------------------+----------+---------------+----------+ ! Dist Brachial                       !Yes       ! Yes            ! None      ! +------------------------------------+----------+---------------+----------+ ! Prox Radial                         !Yes       ! Yes            ! None      ! +------------------------------------+----------+---------------+----------+ ! Dist Radial                         !Yes       ! Yes            ! None      ! +------------------------------------+----------+---------------+----------+ ! Prox Ulnar                          ! Yes       ! Yes            ! None      ! +------------------------------------+----------+---------------+----------+ ! Dist Ulnar                          ! Yes       ! Yes            ! None      ! +------------------------------------+----------+---------------+----------+ ! Basilic at UA                       ! Yes       ! Yes            ! None      ! +------------------------------------+----------+---------------+----------+ ! Basilic at AF                       ! Yes       ! Yes            ! None      ! +------------------------------------+----------+---------------+----------+ ! Laurie at [address]                       ! Yes       ! Yes            ! None      ! 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 Multiple foci of FDG uptake on CT PET without corresponding lesion on CT scan possibly bone metastasis  Atrial fibrillation with RVR  Left subclavian DVT, acute  COPD  Chronic tobacco dependence    RECOMMENDATIONS:  1. I personally reviewed results of lab work-up imaging studies and other relevant clinical data. 2. Discussed findings of brain MRI. Will start patient on Decadron. 3. Discussed possibility of starting chemotherapy while in-house. Patient denies any worsening of symptoms. Patient also expresses concern about being alone and family not allowed to visit when he started on chemotherapy. He would prefer to start it outpatient which is scheduled for next week. 4. Called patient's wife discussed findings of MRI discussed diagnosis results of CT PET and treatment plan. She expressed understanding. 5. Given untreated brain metastasis patient is at risk of brain bleed however he also has a subclavian vein acute DVT continue anticoagulation with close monitoring. 6. Management of atrial fibrillation per cardiology. 7. Patient remains seriously ill and is in the CVICU. 8. Continue symptomatic and supportive care. Discussed with patient and Nurse. Thank you for asking us to see this patient. Krystal Shea MD          This note is created with the assistance of a speech recognition program.  While intending to generate a document that actually reflects the content of the visit, the document can still have some errors including those of syntax and sound a like substitutions which may escape proof reading. It such instances, actual meaning can be extrapolated by contextual diversion.

## 2020-05-21 NOTE — PROGRESS NOTES
will be discharged tomorrow. Home care is being set up with [facility]. Wife is anxious to talk with oncologist to review PET scan and also MRI. Pt did have his MRI completed this morning. I did relay the message to bedside nurse that wife would like to speak to oncologist today. Will continue to follow for support. Spiritual care notified of wife's emotional state yesterday and they will call wife today to check in with her. Emotional support offered and wife appreciative of the calls. Will try to connect again with patient in the cancer center after discharge, possibly next week. Goals/Plan of care  Education/support to patient  Discharge planning/helping to coordinate care  Communications with primary service  Providing support for coping/adaptation/distress of patient  Discussing meaning/purpose   Continue with current plan of care  Clarification of medical condition to patient and family  Code status clarified: Full Code  Recognizing, reflecting, and empathizing with family members' anticipatory grief  Talked with wife on the phone and offered support. She would like to speak with oncologist today. Pt will possibly be discharged tomorrow with home care. Will try to follow up with patient in the cancer center next week.       Palliative Care Coordinator  LIZZETTE [facility] JEFF Ray, RICARDO DUMONT [facility] Office: [address] [place] Office: [phone]  Work Cell Phone: [phone]    For Symptom Management Clinic scheduling please call [phone]

## 2020-05-22 NOTE — PROGRESS NOTES
oriented to person, place and time with normal affect  Neck: good carotid pulses, no JVD, left neck/supraclavicular mass  Lungs: clear to auscultation bilaterally, normal effort  Heart: regular rate and rhythm, no murmur,  Abdomen: soft, obese, non-tender, non-distended, bowel sounds present all four quadrants, no masses, hepatomegaly, splenomegaly or aortic enlargement  Extremities: Mild left arm swelling, mild bilateral lower extremity edema  Skin: no gross lesions, rashes, or induration    Assessment:   3 18-year-old male with acute left subclavian DVT secondary to lung carcinoma/left neck mass  2. 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 Continue Eliquis  2.  Home today    Electronically signed by Gary Walsh MD on 5/22/2020 at 1:07 PM

## 2020-05-22 NOTE — PROGRESS NOTES
Discharge instructions given and reviewed with patient. Patient instructed on changes to home medications and any new orders to follow. Questions answered and pt denies further needs at this time.

## 2020-05-22 NOTE — PROGRESS NOTES
Physical Therapy  Facility/Department: [facility] CVICU  Daily Treatment Note  NAME: Irene You  : 1945  MRN: [id number]    Date of Service: 2020    Discharge Recommendations:  Home with assist PRN, Home with Home health PT   PT Equipment Recommendations  Equipment Needed: No    Assessment   Body structures, Functions, Activity limitations: Decreased balance;Decreased endurance  Assessment: Patient is progressing toward his goals but is limited by his decreased aerobic capacity and fatigue. Patient is motivated to return home to wife and begin chemo treatment outpaitent on Monday. Prognosis: Good  PT Education: General Safety;Home Exercise Program;Energy Conservation;Transfer Training;Functional Mobility Training  REQUIRES PT FOLLOW UP: Yes  Activity Tolerance  Activity Tolerance: Patient limited by endurance; Patient limited by fatigue     Patient Diagnosis(es): The primary encounter diagnosis was Hypoxia. Diagnoses of Small cell lung cancer ([address] Atrial fibrillation with RVR ([address] and Acute deep vein thrombosis (DVT) of left upper extremity, unspecified vein ([address] were also pertinent to this visit. 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 has a past surgical history that includes Tunneled venous port placement (2020). Restrictions  Restrictions/Precautions  Restrictions/Precautions: General Precautions, Fall Risk  Required Braces or Orthoses?: No  Position Activity Restriction  Other position/activity restrictions: IV, 3.5 L O2  Subjective   General  Chart Reviewed: Yes  Response To Previous Treatment: Patient with no complaints from previous session.   Family / Caregiver Present: No  Subjective  Subjective: Patient agreeable for PT treatment. Reports he didn't really like breakfast and recently has poor appetite, RN present. General Comment  Comments: OK for PT per Madyson Miranda RN's  Pain Screening  Patient Currently in Pain: Denies  Pain Assessment  Pain Assessment: 0-10  Pain Level: 0  Vital Signs  Patient Currently in Pain: Denies       Orientation  Orientation  Overall Orientation Status: Within Normal Limits  Cognition   Cognition  Overall Cognitive Status: WFL  Objective   Bed mobility  Rolling to Left: Stand by assistance  Rolling to Right: Stand by assistance  Supine to Sit: Stand by assistance  Sit to Supine: Stand by assistance  Scooting: Stand by assistance  Comment: Patient supine in bed with HOB fully elevated. Patient reports he has not beed able to tolerate HOB low due to SOB. Transfers  Sit to Stand: Contact guard assistance  Stand to sit: Contact guard assistance  Stand Pivot Transfers: Contact guard assistance  Lateral Transfers: Contact guard assistance  Ambulation  Ambulation?: Yes  More Ambulation?: No  Ambulation 1  Surface: level tile  Device: No Device  Other Apparatus: O2(3L)  Assistance: Contact guard assistance  Quality of Gait: slow, steady   Gait Deviations: Decreased step length; Slow Faby  Distance: 50' x 2  Comments: pt tolerated amb well, SpO2 94% post amb, noted SOB throughout treatment   Stairs/Curb  Stairs?: No     Balance  Posture: Good  Sitting - Static: Good  Sitting - Dynamic: Good  Standing - Static: Good  Standing - Dynamic: Good;-  Exercises  Comments: Patient performed seated miller LE AROM x 10 reps, SpO2 94-97% during exercises,  bpm. Patient given written seated HEP and written education for energy conservation. Access Code: [email or web address]   URL: [email or web address]   Date: 05/22/2020   Prepared by: Lee Hernandez     Exercises   Seated Ankle Pumps - 10 reps - 3 sets - 1x daily - 7x weekly   Seated Long Arc Quad - 10 reps - 3 sets - 1x daily - 7x weekly   Seated March - 10 reps - 3 sets - 1x daily - 7x weekly   Seated Hip Abduction - 10 reps - 3 sets - 1x daily - 7x weekly   Sit to Stand with Armchair - 10 reps - 3 sets - 1x daily - 7x weekly       AM-PAC Score  AM-PAC Inpatient Mobility Raw Score : 18 (05/22/20 0935)  AM-PAC Inpatient T-Scale Score : 43.63 (05/22/20 0935)  Mobility Inpatient CMS 0-100% Score: 46.58 (05/22/20 0935)  Mobility Inpatient CMS G-Code Modifier : CK (05/22/20 0935)          Goals  Short term goals  Time Frame for Short term goals: 8 treatments  Short term goal 1: Independent transfers  Short term goal 2: Independent ambulation 200' x 1  Short term goal 3: Tolerate 30 min ther act  Short term goal 4: Good dynamic standing balance  Short term goal 5:  Independently ascend/descend 12 steps w/ 1 HR  Patient Goals   Patient goals : Be able to go home    Plan    Plan  Times per week: 1-2x/day,6-7 days/week  Current Treatment Recommendations: Transfer Training, Gait Training, Stair training, Balance Training, Endurance Training  Safety Devices  Type of devices: Gait belt, Left in chair, Chair alarm in place, Call light within reach  Restraints  Initially in place: No     Therapy Time   Individual Concurrent Group Co-treatment   Time In 0900         Time Out 0938         Minutes [place], [place]

## 2020-05-22 NOTE — PROGRESS NOTES
Today's Date: 5/22/2020  Patient Name: Pipo Coats  Date of admission: 5/19/2020  3:27 PM  Patient's age: 77 y.o., 1945  Admission Dx: Atrial fibrillation with RVR ([address] [I48.91]    Reason for Consult: management recommendations  Requesting Physician: Pina Garcia MD    CHIEF COMPLAINT: Left upper extremity swelling. Cough. Shortness of breath. Small cell lung cancer. History Obtained From:  patient, electronic medical record    Interval history:    Patient seen and examined. Labs and vitals reviewed. Patient continues to be on oxygen. Denies new complaint or interval event. Continues to be on anticoagulation. Leg swelling is stable. HISTORY OF PRESENT ILLNESS:      The patient is a 77 y.o.  male who is admitted to the hospital for chief complaints of fatigue. Patient was recently diagnosed with a small cell lung cancer. Patient underwent port placement yesterday. Was noted to have swelling of his left upper extremity. Patient has been on anticoagulation however anticoagulation was held for a couple of days for the port placement. Ultrasound of the upper extremity shows an acute subclavian vein DVT. Patient was subsequently sent to the ER. Further evaluation revealed A. fib with RVR. Patient was then admitted to the hospital.  At time of evaluation patient does complain of being tired but denies any chest pain. According the patient left arm swelling started about 2 days ago. He has also started noticing mass at the base of his neck on the left side. 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     Past Surgical History: has a past surgical history that includes Tunneled venous port placement (05/19/2020). Medications:    Prior to Admission medications    Medication Sig Start Date End Date Taking? Authorizing Provider   aspirin 81 MG chewable tablet Take 1 tablet by mouth daily 5/23/20  Yes Fletcher Peters MD   budesonide-formoterol [facility]) 160-4.5 MCG/ACT AERO Inhale 2 puffs into the lungs 2 times daily 5/22/20  Yes Fletcher Peters MD   dofetilide (TIKOSYN) 500 MCG capsule Take 1 capsule by mouth every 12 hours 5/22/20  Yes Fletcher Peters MD   levoFLOXacin (LEVAQUIN) 750 MG tablet Take 1 tablet by mouth daily for 5 days 5/22/20 5/27/20 Yes Fletcher Peters MD   dexamethasone (DECADRON) 4 MG tablet Take 1 tablet by mouth every 8 hours for 10 days 5/22/20 6/1/20 Yes Fletcher Peters MD   apixaban Aishwarya Combe DVT/PE STARTER PACK) 5 MG TABS tablet Take 10 mg (2 tablets) orally twice daily for 7 days, then take 5 mg (1 tablet) orally twice daily thereafter. 5/22/20  Yes Fletcher Peters MD   benzonatate (TESSALON) 200 MG capsule Take 1 capsule by mouth 3 times daily as needed for Cough 5/15/20   Gem Melgoza MD   omeprazole (PRILOSEC) 20 MG delayed release capsule Take 1 capsule by mouth daily 5/11/20   Ny Otero MD   ondansetron (ZOFRAN ODT) 8 MG TBDP disintegrating tablet Place 1 tablet under the tongue every 8 hours as needed for Nausea or Vomiting 5/11/20   Roshan Uriostegui MD   lidocaine-prilocaine (EMLA) 2.5-2.5 % cream Apply topically as needed.  5/11/20   Anoop Otero MD   dilTIAZem (CARDIZEM CD) 240 MG extended release capsule Take 1 capsule by mouth daily 5/9/20   Fletcher Peters MD   furosemide (LASIX) 20 MG tablet Take 1 tablet by mouth daily 5/9/20   Fletcher Peters MD   Elastic Bandages & Supports (JOBST KNEE HIGH COMPRESSION SM) MISC 1 each by Does not apply route daily as needed (lower ext edema) 5/8/20   Fletcher Peters MD   enalapril (VASOTEC) 20 MG tablet TAKE 1 TABLET DAILY 2/14/20   [place] Cristobal Luis Alfredo Stevenson MD   metoprolol succinate (TOPROL XL) 100 MG extended release tablet TAKE 1 TABLET DAILY 2/14/20   Shelley Broderick MD   atorvastatin (LIPITOR) 20 MG tablet TAKE 1 TABLET BY MOUTH ONE TIME A DAY 2/14/20   Shelley Broderick MD     Current Facility-Administered Medications   Medication Dose Route Frequency Provider Last Rate Last Dose    dofetilide (TIKOSYN) capsule 500 mcg  500 mcg Oral 2 times per day Santy Taveras MD        enalapril (VASOTEC) tablet 10 mg  10 mg Oral Daily LEROY Galindo NP   10 mg at 05/22/20 0823    dexamethasone (DECADRON) injection 4 mg  4 mg Intravenous Q8H Yariel Shea MD   4 mg at 05/22/20 1533    dilTIAZem (CARDIZEM CD) extended release capsule 240 mg  240 mg Oral Daily LEROY Galindo NP   240 mg at 05/22/20 0823    levalbuterol (XOPENEX) nebulizer solution 1.25 mg  1.25 mg Nebulization Q4H PRN Haven Duverney, APRN - CNP        sodium chloride nebulizer 0.9 % solution 3 mL  3 mL Nebulization 4x Daily PRN Haven Duverney, APRN - CNP        budesonide-formoterol (SYMBICORT) 160-4.5 MCG/ACT inhaler 2 puff  2 puff Inhalation BID Haven Duverney, APRN - CNP   2 puff at 05/22/20 0950    levoFLOXacin (LEVAQUIN) 750 MG/150ML infusion 750 mg  750 mg Intravenous Q24H Ruby Martinez MD   Stopped at 05/21/20 2000    apixaban (ELIQUIS) tablet 10 mg  10 mg Oral BID Nancy Vargas MD   10 mg at 05/22/20 5931    [START ON 5/28/2020] apixaban (ELIQUIS) tablet 5 mg  5 mg Oral BID Nancy Vargas MD        0.9 % sodium chloride infusion   Intravenous Continuous LEROY Galindo NP 75 mL/hr at 05/21/20 1253      pantoprazole (PROTONIX) tablet 40 mg  40 mg Oral QAM AC Zoya LEROY Louie - NP   40 mg at 05/22/20 0646    atorvastatin (LIPITOR) tablet 20 mg  20 mg Oral Daily Zoya Liban, APRN - NP   20 mg at 05/22/20 0144    metoprolol succinate (TOPROL XL) extended release tablet 100 mg  100 mg Oral Daily Zoya Louie APRN - NP   100 mg at 05/22/20 0823    furosemide (LASIX) tablet 20 mg  20 mg Oral Daily Zoya Liban, APRN - NP   20 mg at 05/22/20 4056    sodium chloride flush 0.9 % injection 10 mL  10 mL Intravenous 2 times per day Jose Ramon Barajas, APRN - NP   10 mL at 05/21/20 0904    sodium chloride flush 0.9 % injection 10 mL  10 mL Intravenous PRN Zoya Liban, APRN - NP        acetaminophen (TYLENOL) tablet 650 mg  650 mg Oral Q6H PRN Zoya Liban, APRN - NP        Or    acetaminophen (TYLENOL) suppository 650 mg  650 mg Rectal Q6H PRN Zoya Liban, APRN - NP        polyethylene glycol (GLYCOLAX) packet 17 g  17 g Oral Daily PRN Zoya Liban, APRN - NP        promethazine (PHENERGAN) tablet 12.5 mg  12.5 mg Oral Q6H PRN Zoya Liban, APRN - NP        Or    ondansetron (ZOFRAN) injection 4 mg  4 mg Intravenous Q6H PRN Zoya Liban, APRN - NP        nicotine (NICODERM CQ) 21 MG/24HR 1 patch  1 patch Transdermal Daily PRN Zoya Liban, APRN - NP        aspirin chewable tablet 81 mg  81 mg Oral Daily Zoya Liban, APRN - NP   81 mg at 05/22/20 8049       Allergies:  Patient has no known allergies. Social History:   reports that he quit smoking about 3 weeks ago. His smoking use included cigars and cigarettes. He has a 50.00 pack-year smoking history. He has never used smokeless tobacco. He reports previous alcohol use. He reports that he does not use drugs. Family History: family history includes Heart Disease in his father and mother; Hemochromatosis in his sister. REVIEW OF SYSTEMS:      Constitutional: No fever or chills.  No night sweats, no weight loss   Eyes: No eye discharge, double vision, or eye pain   HEENT: negative for sore mouth, sore throat, hoarseness and voice change   Respiratory: Positive shortness of breath with exertion  Cardiovascular: negative for chest pain, dyspnea, palpitations, orthopnea, PND   Gastrointestinal: negative for nausea, vomiting, diarrhea, constipation, abdominal pain, Dysphagia, hematemesis and hematochezia   Genitourinary: negative for frequency, dysuria, nocturia, urinary incontinence, and hematuria   Integument: negative for rash, skin lesions, bruises.    Hematologic/Lymphatic: negative for easy bruising, bleeding, lymphadenopathy, or petechiae   Endocrine: negative for heat or cold intolerance,weight changes, change in bowel habits and hair loss   Musculoskeletal: negative for myalgias, arthralgias, pain, joint swelling,and bone pain   Neurological: negative for headaches, dizziness, seizures, weakness, numbness    PHYSICAL EXAM:        BP (!) 95/48   Pulse 77   Temp 96.5 °F (35.8 °C) (Temporal)   Resp 18   Ht 6' 2\" (1.88 m)   Wt 286 lb 8 oz (130 kg)   SpO2 95%   BMI 36.78 kg/m²    Temp (24hrs), Av °F (36.1 °C), Min:96.5 °F (35.8 °C), Max:97.4 °F (36.3 °C)      General appearance - well appearing, no in pain or distress   Mental status - alert and cooperative   Eyes - pupils equal and reactive, extraocular eye movements intact   Ears - bilateral TM's and external ear canals normal   Mouth - mucous membranes moist, pharynx normal without lesions   Neck - supple, no significant adenopathy   Lymphatics - no palpable lymphadenopathy, no hepatosplenomegaly   Chest -decreased breathing sounds more so on the left side  Heart - normal rate, regular rhythm, normal S1, S2, no murmurs  Abdomen - soft, nontender, nondistended, no masses or organomegaly   Neurological - alert, oriented, normal speech, no focal findings or movement disorder noted   Musculoskeletal - no joint tenderness, deformity or swelling   Extremities - peripheral pulses normal, no pedal edema, no clubbing or cyanosis   Skin - normal coloration and turgor, no rashes, no suspicious skin lesions noted ,      DATA:      Labs:     Results for orders placed or performed during the hospital encounter of 20   CBC Auto Differential   Result Value Ref Range    WBC 14.6 (H) 3.5 - 11.3 k/uL    RBC 4. 40 4.21 - 5.77 m/uL    Hemoglobin 13.0 13.0 - 17.0 g/dL    Hematocrit 41.7 40.7 - 50.3 %    MCV 94.8 82.6 - 102.9 fL    MCH 29.5 25.2 - 33.5 pg    MCHC 31.2 28.4 - 34.8 g/dL    RDW 14.6 (H) 11.8 - 14.4 %    Platelets 546 674 - 083 k/uL    MPV 11.6 8.1 - 13.5 fL    NRBC Automated 0.0 0.0 per 100 WBC    Differential Type NOT REPORTED     WBC Morphology NOT REPORTED     RBC Morphology NOT REPORTED     Platelet Estimate NOT REPORTED     Seg Neutrophils 89 (H) 36 - 66 %    Lymphocytes 8 (L) 24 - 44 %    Monocytes 3 1 - 7 %    Eosinophils % 0 (L) 1 - 4 %    Basophils 0 %    Immature Granulocytes 0 0 %    Segs Absolute 12.99 (H) 1.8 - 7.7 k/uL    Absolute Lymph # 1.17 1.0 - 4.8 k/uL    Absolute Mono # 0.44 0.2 - 0.8 k/uL    Absolute Eos # 0.00 0.0 - 0.4 k/uL    Basophils Absolute 0.00 0.0 - 0.2 k/uL    Absolute Immature Granulocyte 0.00 0.00 - 0.30 k/uL   Comprehensive Metabolic Panel w/ Reflex to MG   Result Value Ref Range    Glucose 165 (H) 70 - 99 mg/dL    BUN 30 (H) 8 - 23 mg/dL    CREATININE 0.77 0.70 - 1.20 mg/dL    Bun/Cre Ratio 39 (H) 9 - 20    Calcium 9.6 8.6 - 10.4 mg/dL    Sodium 141 135 - 144 mmol/L    Potassium 4.1 3.7 - 5.3 mmol/L    Chloride 99 98 - 107 mmol/L    CO2 26 20 - 31 mmol/L    Anion Gap 16 9 - 17 mmol/L    Alkaline Phosphatase 63 40 - 129 U/L    ALT 19 5 - 41 U/L    AST 49 (H) <40 U/L    Total Bilirubin 0.65 0.3 - 1.2 mg/dL    Total Protein 6.6 6.4 - 8.3 g/dL    Alb 3.1 (L) 3.5 - 5.2 g/dL    Albumin/Globulin Ratio NOT REPORTED 1.0 - 2.5    GFR Non-African American >60 >60 mL/min    GFR African American >60 >60 mL/min    GFR Comment          GFR Staging NOT REPORTED    APTT   Result Value Ref Range    PTT 31.9 23.9 - 33.8 sec   Protime-INR   Result Value Ref Range    Protime 17.2 (H) 11.5 - 14.2 sec    INR 1.4    Comprehensive Metabolic Panel w/ Reflex to MG   Result Value Ref Range    Glucose 124 (H) 70 - 99 mg/dL    BUN 34 (H) 8 - 23 mg/dL    CREATININE 0.86 0.70 - 1.20 mg/dL    Bun/Cre Ratio Sodium 139 135 - 144 mmol/L    Potassium 4.8 3.7 - 5.3 mmol/L    Chloride 99 98 - 107 mmol/L    CO2 25 20 - 31 mmol/L    Anion Gap 15 9 - 17 mmol/L    Alkaline Phosphatase 59 40 - 129 U/L    ALT 8 5 - 41 U/L    AST 61 (H) <40 U/L    Total Bilirubin 0.57 0.3 - 1.2 mg/dL    Total Protein 6.5 6.4 - 8.3 g/dL    Alb 2.7 (L) 3.5 - 5.2 g/dL    Albumin/Globulin Ratio NOT REPORTED 1.0 - 2.5    GFR Non-African American >60 >60 mL/min    GFR African American >60 >60 mL/min    GFR Comment          GFR Staging NOT REPORTED    Magnesium   Result Value Ref Range    Magnesium 2.2 1.6 - 2.6 mg/dL   CBC Auto Differential   Result Value Ref Range    WBC 9.4 3.5 - 11.3 k/uL    RBC 3.90 (L) 4.21 - 5.77 m/uL    Hemoglobin 11.4 (L) 13.0 - 17.0 g/dL    Hematocrit 37.2 (L) 40.7 - 50.3 %    MCV 95.4 82.6 - 102.9 fL    MCH 29.2 25.2 - 33.5 pg    MCHC 30.6 28.4 - 34.8 g/dL    RDW 14.9 (H) 11.8 - 14.4 %    Platelets 184 376 - 966 k/uL    MPV 11.8 8.1 - 13.5 fL    NRBC Automated 0.0 0.0 per 100 WBC    Differential Type NOT REPORTED     WBC Morphology NOT REPORTED     RBC Morphology NOT REPORTED     Platelet Estimate NOT REPORTED     Seg Neutrophils 91 (H) 36 - 65 %    Lymphocytes 4 (L) 24 - 43 %    Monocytes 4 3 - 12 %    Eosinophils % 0 (L) 1 - 4 %    Basophils 0 0 - 2 %    Immature Granulocytes 1 (H) 0 %    Segs Absolute 8.55 (H) 1.50 - 8.10 k/uL    Absolute Lymph # 0.38 (L) 1.10 - 3.70 k/uL    Absolute Mono # 0.38 0.10 - 1.20 k/uL    Absolute Eos # 0.00 0.00 - 0.44 k/uL    Basophils Absolute 0.00 0.00 - 0.20 k/uL    Absolute Immature Granulocyte 0.09 0.00 - 0.30 k/uL   EKG 12 Lead   Result Value Ref Range    Ventricular Rate 104 BPM    Atrial Rate 111 BPM    QRS Duration 100 ms    Q-T Interval 400 ms    QTc Calculation (Bazett) 526 ms    R Axis -4 degrees    T Axis -142 degrees   EKG 12 Lead   Result Value Ref Range    Ventricular Rate 92 BPM    Atrial Rate 250 BPM    QRS Duration 98 ms    Q-T Interval 394 ms    QTc Calculation (Bazett) 487 ms    R Axis 4 degrees    T Axis -135 degrees   EKG 12 Lead   Result Value Ref Range    Ventricular Rate 69 BPM    Atrial Rate 69 BPM    P-R Interval 158 ms    QRS Duration 100 ms    Q-T Interval 484 ms    QTc Calculation (Bazett) 518 ms    P Axis 51 degrees    R Axis 9 degrees    T Axis -44 degrees   EKG 12 Lead   Result Value Ref Range    Ventricular Rate 74 BPM    Atrial Rate 74 BPM    P-R Interval 144 ms    QRS Duration 96 ms    Q-T Interval 464 ms    QTc Calculation (Bazett) 515 ms    P Axis 23 degrees    R Axis 14 degrees    T Axis 9 degrees   EKG 12 Lead   Result Value Ref Range    Ventricular Rate 69 BPM    Atrial Rate 66 BPM    QRS Duration 100 ms    Q-T Interval 434 ms    QTc Calculation (Bazett) 465 ms    R Axis 10 degrees    T Axis -31 degrees   EKG 12 Lead   Result Value Ref Range    Ventricular Rate 98 BPM    Atrial Rate 85 BPM    QRS Duration 98 ms    Q-T Interval 386 ms    QTc Calculation (Bazett) 492 ms    R Axis 10 degrees    T Axis -150 degrees   EKG 12 Lead   Result Value Ref Range    Ventricular Rate 111 BPM    Atrial Rate 119 BPM    QRS Duration 96 ms    Q-T Interval 372 ms    QTc Calculation (Bazett) 505 ms    R Axis 7 degrees    T Axis -165 degrees         IMAGING DATA:    Xr Chest Portable    Result Date: 5/19/2020  EXAMINATION: ONE XRAY VIEW OF THE CHEST 5/19/2020 3:56 pm COMPARISON: 05/04/2020. HISTORY: ORDERING SYSTEM PROVIDED HISTORY: sob TECHNOLOGIST PROVIDED HISTORY: sob Reason for Exam: SOB Acuity: Acute Type of Exam: Initial FINDINGS: There is progressive opacification and volume loss within the left lung. Increased opacity in the perihilar region suggest progression of the patient's known malignancy. The right lung is clear. The cardiac silhouette is obscured. Aortic vascular calcification. Right-sided venous access port with tip in the SVC and no pneumothorax. Progressive opacification of the left hemithorax. Port catheter tip in the SVC with no pneumothorax.      Xr Chest Portable    Result Date: 5/4/2020  EXAMINATION: ONE XRAY VIEW OF THE CHEST 5/4/2020 3:22 pm COMPARISON: CT chest dated 07/02/2018. No previous chest x-ray. HISTORY: ORDERING SYSTEM PROVIDED HISTORY: Chest Pain TECHNOLOGIST PROVIDED HISTORY: Chest Pain Reason for Exam: Pt states sob x 1 week Acuity: Acute Type of Exam: Initial FINDINGS: Heart size is within normal limits. There is thickening of the right paratracheal stripe. A masslike opacity is seen in the left hilar region/AP window, and there is airspace opacity in the mid and lower left lung. There is mild elevation of the left hemidiaphragm, which appears to be a new finding. No evidence of pneumothorax. No free air beneath the diaphragm. 1.  Masslike opacity at the left hilum with airspace consolidation in the mid and lower left lung. This is concerning for left hilar malignancy with postobstructive pneumonia or atelectasis. Contrast-enhanced CT chest is recommended. 2.  Thickened right paratracheal stripe, suggesting lymphadenopathy. Ct Chest Pulmonary Embolism W Contrast    Result Date: 5/4/2020  EXAMINATION: CTA OF THE CHEST, 5/4/2020 4:15 pm TECHNIQUE: CTA of the chest was performed after the administration of intravenous contrast.  Multiplanar reformatted images are provided for review. MIP images are provided for review. Dose modulation, iterative reconstruction, and/or weight based adjustment of the mA/kV was utilized to reduce the radiation dose to as low as reasonably achievable. COMPARISON: July 2, 2018 HISTORY: ORDERING SYSTEM PROVIDED HISTORY: Chest Pain r/o PE TECHNOLOGIST PROVIDED HISTORY: Chest Pain r/o PE Reason for Exam: SOB, a-fib x 2 weeks, cough Acuity: Acute Type of Exam: Initial FINDINGS: Chest wall: Mild nonspecific infiltration within the left axilla. No axillary adenopathy. Upper Abdomen: No adrenal nodule. Mild left-sided hydronephrosis with adjacent calculi within the left renal pelvis measuring 9 mm. Cholelithiasis. Mediastinum: No cardiomegaly. No pericardial effusion. Coronary artery calcifications. Massive mediastinal and hilar adenopathy. Right paratracheal adenopathy measures 5.2 cm. Subcarinal adenopathy measures 5.4 cm. Multiple enlarged lymph nodes are seen within the superior mediastinum. Conglomerate left hilar adenopathy causes narrowing of the left main pulmonary artery. Pulmonary Arteries: No central pulmonary embolus. No right-sided pulmonary embolus. Left main pulmonary artery is narrowed by tumor. Questionable chronic nonocclusive left lower lobe pulmonary embolus versus tumor infiltration. Lungs: Emphysematous changes. Small left pleural effusion. No right pleural effusion. Conglomerate masses within the left lower lobe extending into the left hilum. This measures at least 7.0 cm. Bones: Thoracic spine degenerative changes. No acute central or segmental pulmonary embolus. Questionable left lower lobe chronic nonocclusive pulmonary embolus versus tumor infiltration. Left main pulmonary artery is surrounded by tumor which causes narrowing. Massive mediastinal and left hilar adenopathy. Multiple conglomerate left lower lobe masses. Mild left-sided hydronephrosis with suspected partially obstructing calculi within the left renal pelvis. Ir Port Placement > 5 Years    Result Date: 5/19/2020  PROCEDURE: ULTRASOUND GUIDED VASCULAR ACCESS. FLUOROSCOPY GUIDED PLACEMENT OF A SUBCUTANEOUS PORT-A-CATH. MODERATE CONSCIOUS SEDATION 5/19/2020. HISTORY: ORDERING SYSTEM PROVIDED HISTORY: Malignant neoplasm of left lung, unspecified part of lung (HCC) SEDATION: 1.5 mgversed and 100 micro g fentanyl were titrated intravenously for moderate sedation monitored under my direction. Total intraservice time of sedation was 30 minutes. The patient's vital signs were monitored throughout the procedure and recorded in the patient's medical record by the nurse.  FLUOROSCOPY DOSE AND TYPE OR TIME AND EXPOSURES:  mGy cm squared TECHNIQUE: Informed consent was obtained after a detailed explanation of the procedure including risks, benefits, and alternatives. Universal protocol was observed. The right neck and chest were prepped and draped in sterile fashion using maximum sterile barrier technique. Local anesthesia was achieved with lidocaine. A micropuncture needle was used to access the right internal jugular vein using ultrasound guidance. An ultrasound image demonstrating patency of the vein with needle tip located within it. An image was obtained and stored in PACs. A 0.035 guidewire was used to place a peel-away sheath. A chest wall incision was made and a subcutaneous pocket was created. The port reservoir was placed within the pocket and the port tubing was attached and tunneled subcutaneously to the venotomy site. The port tubing was cut to an appropriate length and advanced through the peel-away sheath under fluoroscopic guidance to the cavo-atrial junction. The chest wall incision was closed using 2-0 Vicryl suture and tissue adhesive was applied to the venotomy site and chest wall incision. The port flushed easily and there was a good blood return. The port was locked with heparinized saline. The patient tolerated the procedure well and there were no immediate complications. FINDINGS: Fluoroscopic image demonstrates the tip of the port at the cavo-atrial junction . Successful ultrasound and fluoroscopy guided right internal jugular vein 8 [place] power injectable Port-A-Cath placement. Ready for use. Ir Biopsy Lymph Node Superficial    Result Date: 5/6/2020  PROCEDURE: ULTRASOUND GUIDED CORE BIOPSY LYMPH NODE 5/6/2020 HISTORY: ORDERING SYSTEM PROVIDED HISTORY: Left supraclavicular LN biopsy TECHNOLOGIST PROVIDED HISTORY: Left supraclavicular LN biopsy PHYSICIANS: Sailaja Grover.  Enrique Cisse MD SEDATION: Local anesthesia TECHNIQUE: Informed consent was obtained after a detailed discussion about the procedure including the risk, benefits, and alternatives. Universal protocol was followed. Axial images were obtained through the palpable and enlarged left supraclavicular lymph node, and a suitable skin site was prepped and draped in sterile fashion. Local anesthesia was achieved with lidocaine. Core biopsy was performed with ultrasound guidance. Four 18 gauge core biopsy specimens and 2 FNAs (25 and 21 gauge needles) were obtained, and the patient tolerated the procedure well. Specimen quality was confirmed by pathology on site. Dose modulation, iterative reconstruction, and/or weight based adjustment of the mA/kV was utilized to reduce the radiation dose to as low as reasonably achievable. FINDINGS: Ultrasound images show good needle position within the enlarged node. Successful ultrasound guided core biopsy enlarged left supraclavicular lymph node. Pet Ct Skull Base To Mid Thigh    Result Date: 5/14/2020  EXAMINATION: WHOLE BODY PET/CT 5/14/2020 TECHNIQUE: Following IV injection of 12.2 mCi of F 18 -FDG, PET  tumor imaging was acquired from the base of the skull to the mid thighs. Computed tomography was used for purposes of attenuation correction and anatomic localization. Fusion imaging was utilized for interpretation. Uptake time 67 mins. Glucose level 132 mg/dl. COMPARISON: CT chest 05/04/2020. Left supraclavicular lymph node biopsy 05/06/2020. Jere Stack HISTORY: ORDERING SYSTEM PROVIDED HISTORY: Small cell lung cancer ([address] TECHNOLOGIST PROVIDED HISTORY: small cell lung cancer intial staging Reason for Exam: Small cell lung CA, mets to mediastinal lymph node and supraclavicular node, initial staging Acuity: Acute Type of Exam: Initial FINDINGS: HEAD/NECK: Small foci of metabolic activity are noted in the maxilla and mandible with associated dental disease noted.  Large left supraclavicular soft tissue mass corresponding to the site of biopsy measures 9.0 x 3.4 cm and demonstrates abnormal metabolic activity (SUV max 26). CHEST: Extensive mediastinal and left hilar lymphadenopathy noted with abnormal metabolic activity. Representative conglomerate anterior mediastinal dawson mass has an SUV max of 20. Some of the lymph nodes in the right paratracheal region and left hilum have only peripheral metabolic activity suggesting central necrosis. The soft tissue abnormality extending into the left lower lobe also demonstrates abnormal metabolic activity (SUV max of 20). The pleural fluid relatively loculated in the superior aspect of the left major fissure is without metabolic abnormality. ABDOMEN/PELVIS: 14 mm soft tissue nodule along the surface of the tail the pancreas demonstrates abnormal metabolic activity (SUV max 29). BONES/SOFT TISSUE: Widespread foci of abnormal metabolic abnormality are present throughout the osseous structures without definable lytic or blastic lesions. INCIDENTAL CT FINDINGS: Paraseptal emphysematous disease. Loculated left pleural fluid, as above. Cholelithiasis. 6 mm stone at the left UPJ with excreted radiotracer distal to the stone noted. Mild pelvicaliectasis. Nonobstructing punctate stone in the right kidney and right renal cyst.     1.  Widespread markedly metabolically active neoplastic disease involving the left supraclavicular lymph node, conglomerate mediastinal lymph nodes and left pulmonary opacities. Multifocal metabolic abnormalities throughout the skeleton without defined lytic or blastic lesions. 2.  Incidental findings include loculated pleural fluid in the superior aspect of the left major fissure. Cholelithiasis and bilateral nephrolithiasis. 6 mm stone is present in the left UPJ without evidence for complete obstruction. 3.  Focal areas of metabolic activity in the maxilla and mandible favored to be related to dental disease.      Vl Upper Extremity Venous Duplex Left    Result Date: 5/19/2020    [facility]  Vascular Upper Extremities !Visualized! Compressibility! Thrombosis! +------------------------------------+----------+---------------+----------+ ! Prox IJV                            ! Yes       ! Yes            ! None      ! +------------------------------------+----------+---------------+----------+ ! Dist IJV                            ! Yes       ! Yes            ! None      ! +------------------------------------+----------+---------------+----------+ ! Prox SCV                            ! Yes       ! No             !Acute     ! +------------------------------------+----------+---------------+----------+ ! Dist SCV                            ! Yes       ! No             !Acute     ! +------------------------------------+----------+---------------+----------+ ! Innominate                          ! Yes       ! Yes            ! None      ! +------------------------------------+----------+---------------+----------+ ! Prox Axillary                       ! Yes       ! Yes            ! None      ! +------------------------------------+----------+---------------+----------+ ! Dist Axillary                       ! Yes       ! Yes            ! None      ! +------------------------------------+----------+---------------+----------+ ! Prox Brachial                       !Yes       ! Yes            ! None      ! +------------------------------------+----------+---------------+----------+ ! Dist Brachial                       !Yes       ! Yes            ! None      ! +------------------------------------+----------+---------------+----------+ ! Prox Radial                         !Yes       ! Yes            ! None      ! +------------------------------------+----------+---------------+----------+ ! Dist Radial                         !Yes       ! Yes            ! None      ! +------------------------------------+----------+---------------+----------+ ! Prox Ulnar                          ! Yes       ! Yes            ! None      ! +------------------------------------+----------+---------------+----------+ ! Dist Ulnar                          ! Yes       ! Yes            ! None      ! +------------------------------------+----------+---------------+----------+ ! Basilic at UA                       ! Yes       ! Yes            ! None      ! +------------------------------------+----------+---------------+----------+ ! Basilic at AF                       ! Yes       ! Yes            ! None      ! +------------------------------------+----------+---------------+----------+ ! Basilic at [address]                       ! Yes       ! Yes            ! None      ! +------------------------------------+----------+---------------+----------+ ! Cephalic at UA                      ! Yes       ! Yes            ! None      ! +------------------------------------+----------+---------------+----------+ ! Cephalic at AF                      ! Yes       ! Yes            ! None      ! +------------------------------------+----------+---------------+----------+ ! Cephalic at [address]                      ! Yes       ! Yes            ! None      ! +------------------------------------+----------+---------------+----------+ Doppler Measurements +-------------------------+-------------------------+----------------------+ ! Location                 ! Signal                   !Reflux                ! +-------------------------+-------------------------+----------------------+ ! IJV                      ! Phasic                   !                      ! +-------------------------+-------------------------+----------------------+ ! SCV                      ! Absent                   ! No                    ! +-------------------------+-------------------------+----------------------+ ! Innominate               ! Phasic                   ! No                    ! +-------------------------+-------------------------+----------------------+ ! Axillary                 ! Diminished               ! No                    ! +-------------------------+-------------------------+----------------------+ ! Brachial                 !Diminished               ! No                    ! 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 Multiple foci of FDG uptake on CT PET without corresponding lesion on CT scan possibly bone metastasis  Atrial fibrillation with RVR  Left subclavian DVT, acute  COPD  Chronic tobacco dependence    RECOMMENDATIONS:  1. I personally reviewed results of lab work-up imaging studies and other relevant clinical data. 2. Continue Decadron for now brain metastasis  3. Plans to proceed with chemotherapy as an outpatient. 4. Patient understands his disease not curable and goal of treatment will be to control disease and prolong life and hopefully improve quality of life  5. Continue anticoagulation for DVT  6. Continue symptomatic and supportive care. Discussed with patient and Nurse. Thank you for asking us to see this patient.           Barb Shea MD          This note is created with the assistance of a speech recognition program.  While intending to generate a document that actually reflects the content of the visit, the document can still have some errors including those of syntax and sound a like substitutions

## 2020-05-22 NOTE — PLAN OF CARE
Problem: Discharge Planning:  Goal: Patients continuum of care needs are met  Description: Patients continuum of care needs are met  Outcome: Met This Shift

## 2020-05-22 NOTE — PROGRESS NOTES
Section of Cardiology  Progress Note      Date:  5/22/2020  Patient: Preeti Broderick  Admission:  5/19/2020  3:27 PM  Admit DX: Atrial fibrillation with RVR ([address] [I48.91]  Age:  77 y.o., 1945     LOS: 3 days     Reason for evaluation:   ATRIAL FIBRILLATION  LEFT UPPER EXTREMITY DVT  METASTATIC LUNG CANCER      SUBJECTIVE:     The patient was seen and examined. Notes and labs reviewed. There were not complications over night. Patient's cardiac review of systems: positive for fatigue. The patient is generally feeling unchanged. OBJECTIVE:      EXAM:   Vitals:    VITALS:  /67   Pulse 108   Temp 96.7 °F (35.9 °C) (Temporal)   Resp 16   Ht 6' 2\" (1.88 m)   Wt 286 lb 8 oz (130 kg)   SpO2 95%   BMI 36.78 kg/m²   24HR INTAKE/OUTPUT:      Intake/Output Summary (Last 24 hours) at 5/22/2020 0956  Last data filed at 5/22/2020 0400  Gross per 24 hour   Intake 800 ml   Output --   Net 800 ml       CONSTITUTIONAL:  awake, alert, cooperative, no apparent distress, and appears stated age. HEENT: Normal jugular venous pulsations, no carotid bruits. Head is atraumatic, normocephalic. Eyes and oral mucosa are normal.  LUNGS: Good respiratory effort On auscultation: clear to auscultation bilaterally  CARDIOVASCULAR:  Normal apical impulse, irregular rate and rhythm, normal S1 and S2, no S3 or S4, and no murmur or rub noted. dorsalis pedis and bilateralpresent 2+  ABDOMEN: Soft, nontender, nondistended. Bowel sounds present. No masses or tenderness. No bruit. SKIN: Warm and dry.   EXTREMITIES:2+ LEFT UPPER EXTREMITY EDEMA  Current Inpatient Medications:   dofetilide  500 mcg Oral 2 times per day    enalapril  10 mg Oral Daily    dexamethasone  4 mg Intravenous Q8H    dilTIAZem  240 mg Oral Daily    budesonide-formoterol  2 puff Inhalation BID    levofloxacin  750 mg Intravenous Q24H    apixaban  10 mg Oral BID    [START ON 5/28/2020] apixaban  5 mg Oral BID    pantoprazole  40 mg Oral QAM AC    EXTREMITY  3. METASTATIC LUNG CANCER. 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 CONTINUE TIKOSYN  MCG PO BID. 2. CONTINUE ELIQUIS FOR DVT. Discussed with patient and nursing.     Dina Noriega MD

## 2020-05-22 NOTE — PLAN OF CARE
Problem: Falls - Risk of:  Goal: Will remain free from falls  Description: Will remain free from falls  Outcome: Ongoing  Goal: Absence of physical injury  Description: Absence of physical injury  Outcome: Ongoing     Problem: Nutrition Deficit:  Goal: Ability to achieve adequate nutritional intake will improve  Description: Ability to achieve adequate nutritional intake will improve  Outcome: Ongoing     Problem: Infection:  Goal: Will remain free from infection  Description: Will remain free from infection  Outcome: Ongoing     Problem: Safety:  Goal: Free from accidental physical injury  Description: Free from accidental physical injury  Outcome: Ongoing  Goal: Free from intentional harm  Description: Free from intentional harm  Outcome: Ongoing     Problem: Daily Care:  Goal: Daily care needs are met  Description: Daily care needs are met  Outcome: Ongoing     Problem: Pain:  Goal: Patient's pain/discomfort is manageable  Description: Patient's pain/discomfort is manageable  Outcome: Ongoing     Problem: Skin Integrity:  Goal: Skin integrity will stabilize  Description: Skin integrity will stabilize  Outcome: Ongoing     Problem: Discharge Planning:  Goal: Patients continuum of care needs are met  Description: Patients continuum of care needs are met  Outcome: Ongoing

## 2020-05-22 NOTE — DISCHARGE SUMMARY
[address]      Discharge Summary     Patient ID: Pipo Coats  :     MRN: [id number]     ACCOUNT:  [de-identified]   Patient Location :   Patient's PCP: Andrews More MD  Admit Date: 2020   Discharge Date: 2020     Length of Stay: 3  Code Status:  Prior  Admitting Physician: Pina Garcia MD  Discharge Physician: Pina Garcia MD     Active Discharge Diagnosis :     Primary Problem  Atrial fibrillation with RVR [facility])      [place] Problems    Diagnosis Date Noted    Atrial fibrillation with RVR ([address] [I48.91] 2020    Acute respiratory failure with hypoxia ([address] [J96.01] 2020    Pneumonia of left lower lobe due to infectious organism ([address] [J18.1] 2020    Small cell lung cancer ([address] [C34.90] 2020    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 male who was admitted for the management of   Atrial fibrillation with RVR ([address] , presented to ER with Arm Swelling    Patient was in IR for PORT placement and was found to have Left upper ext swelling . Doppler showed left subclavian DVT . patient was also found to have tachycadia and was sent to ED. Patient had tachycardia with Afib RVR . Patient was given cardizem bolus and started on cardizem infusion. Evaluation showed leukocytosis.   He was recently admitted to the hospital for A. fib RVR and found to have left supraclavicular lymphadenopathy.  Liver biopsy showed small cell lung cancer.  During interval patient had port placed yesterday and chemotherapy is planned to be started on 2020.     Patient was treated with Up POST HOSPITALIZATION/Incidental Findings: follow up with oncology on 5/26/20. Diet: cardiac diet    Activity: As tolerated. Instructions to Patient: f/u with PCP and oncology . Discharge Medications:      Medication List      START taking these medications    aspirin 81 MG chewable tablet  Take 1 tablet by mouth daily  Start taking on:  May 23, 2020     budesonide-formoterol 160-4.5 MCG/ACT Aero  Commonly known as:  SYMBICORT  Inhale 2 puffs into the lungs 2 times daily     dexamethasone 4 MG tablet  Commonly known as:  Decadron  Take 1 tablet by mouth every 8 hours for 10 days     dofetilide 500 MCG capsule  Commonly known as:  TIKOSYN  Take 1 capsule by mouth every 12 hours     levoFLOXacin 750 MG tablet  Commonly known as:  Levaquin  Take 1 tablet by mouth daily for 5 days        CHANGE how you take these medications    apixaban 5 MG Tabs tablet  Commonly known as:  Eliquis DVT/PE Starter Pack  Take 10 mg (2 tablets) orally twice daily for 7 days, then take 5 mg (1 tablet) orally twice daily thereafter. What changed:    · how much to take  · how to take this  · when to take this  · additional instructions        CONTINUE taking these medications    atorvastatin 20 MG tablet  Commonly known as:  LIPITOR  TAKE 1 TABLET BY MOUTH ONE TIME A DAY     benzonatate 200 MG capsule  Commonly known as:  TESSALON  Take 1 capsule by mouth 3 times daily as needed for Cough     dilTIAZem 240 MG extended release capsule  Commonly known as:  CARDIZEM CD  Take 1 capsule by mouth daily     enalapril 20 MG tablet  Commonly known as:  VASOTEC  TAKE 1 TABLET DAILY     furosemide 20 MG tablet  Commonly known as:  LASIX  Take 1 tablet by mouth daily     JOBST KNEE HIGH COMPRESSION SM Misc  1 each by Does not apply route daily as needed (lower ext edema)     lidocaine-prilocaine 2.5-2.5 % cream  Commonly known as:  EMLA  Apply topically as needed.      metoprolol succinate 100 MG extended release tablet  Commonly known as: TOPROL XL  TAKE 1 TABLET DAILY     omeprazole 20 MG delayed release capsule  Commonly known as:  PriLOSEC  Take 1 capsule by mouth daily     ondansetron 8 MG Tbdp disintegrating tablet  Commonly known as:  Zofran ODT  Place 1 tablet under the tongue every 8 hours as needed for Nausea or Vomiting           Where to Get Your Medications      These medications were sent to [address] [place] [address]  [facility] [address]    Hours:  24-hours Phone:  [phone]   · aspirin 81 MG chewable tablet  · budesonide-formoterol 160-4.5 MCG/ACT Aero  · dexamethasone 4 MG tablet  · dofetilide 500 MCG capsule  · levoFLOXacin 750 MG tablet     You can get these medications from any pharmacy    Bring a paper prescription for each of these medications  · apixaban 5 MG Tabs tablet         Time Spent on discharge is  33 mins in patient examination, evaluation, counseling as well as medication reconciliation, prescriptions for required medications, discharge plan and follow up. Electronically signed by   Ivan Wilkins MD  5/22/2020        Thank you Dr. Vergie Hatchet, MD for the opportunity to be involved in this patient's care.

## 2020-05-22 NOTE — CARE COORDINATION
Discharge planning    Patient did not qualify for home 02. Patient anticipated dc home and notified Shayan Matthews from [address] [place] [address].

## 2020-05-22 NOTE — PROGRESS NOTES
discharge  · Xopenex Q 4 hours prn  · Symbicort  · Discontinue IV Levaquin, start oral on discharge  · BiPAP, while asleep and as needed  · Heart rate control per cardiology, po cardizem & tikosyn  · Oncology following  · Vascular surgery following   · DVT prophylaxis, on Eliquis   · Recommend sleep study/PFT as an outpatient  · Plan for chemo next week noted  · Blanche Esquivel for discharge planning from pulmonary standpoint with outpatient follow up in 2 weeks   · Discussed with RN  · Will follow with you    Electronically signed by   MATHIEU Dhaliwal  Patient seen under the supervision of Mansoor Petty MD, [facility]     Patient seen and evaluated by me. He is sitting up in the chair. He denies significant shortness of breath or chest pain. He still has productive cough. Plan is to discontinue IV Levaquin and start oral, continue Symbicort, he did not qualify for home oxygen. He is okay to discharge home from pulmonary standpoint with outpatient follow-up in 1 to 2 weeks. Above was reviewed and discussed with Lindsay Short CNP. And I agree with assessment and plan of care.   Electronically signed by     Lala Mirza MD on 5/22/2020 at 1:16 PM  Pulmonary Critical Care and Sleep Medicine,  [facility]  Cell: [phone]  Office: [phone]

## 2020-05-22 NOTE — PROGRESS NOTES
[address]      Daily Progress Note     Admit Date: 5/19/2020  Bed/Room No.  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 *    Subjective : Interval History/Significant events :  05/22/20    Patient reports that breathing is stable , no cough, expectoration . He has fatigue . No chest pain. Back in Afib , rate controlled. Vitals - Stable afebrile  Labs - creatinine normal . Normal CBC,     Nursing notes , Consults notes reviewed. Overnight events and updates discussed with Nursing staff . Background History:         Clara Hernandez is 77 y.o. male  Who was admitted to the hospital on 5/19/2020 for treatment of Atrial fibrillation with RVR ([address] Patient was in IR for PORT placement and was found to have Left upper ext swelling . Doppler showed left subclavian DVT . patient was also found to have tachycadia and was sent to ED. Patient had tachycardia with Afib RVR . Patient was given cardizem bolus and started on cardizem infusion. Evaluation showed leukocytosis. He was recently admitted to the hospital for A. fib RVR and found to have left supraclavicular lymphadenopathy.  Liver biopsy showed small cell lung cancer.  During interval patient had port placed yesterday and chemotherapy is planned to be started on 5/25/2020. Patient was treated with heparin infusion and changed to high dose Eliquis. Patient was treated with Cardizem bolus and infusion and had Paroxysmal afib and was converting to sinus rhythm with recurrence. Cardiology was consulted and added tikosyn. levaquin was given for pneumonia . 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 (HCC)     Supraclavicular lymphadenopathy       Allergies: No Known Allergies   Medications :  dofetilide, 250 mcg, Oral, 2 times per day  enalapril, 10 mg, Oral, Daily  dexamethasone, 4 mg, Intravenous, Q8H  dilTIAZem, 240 mg, Oral, Daily  budesonide-formoterol, 2 puff, Inhalation, BID  levofloxacin, 750 mg, Intravenous, Q24H  apixaban, 10 mg, Oral, BID  [START ON 5/28/2020] apixaban, 5 mg, Oral, BID  pantoprazole, 40 mg, Oral, QAM AC  atorvastatin, 20 mg, Oral, Daily  metoprolol succinate, 100 mg, Oral, Daily  furosemide, 20 mg, Oral, Daily  sodium chloride flush, 10 mL, Intravenous, 2 times per day  aspirin, 81 mg, Oral, Daily        Review of Systems   Review of Systems   Constitutional: Positive for fatigue. Negative for activity change, appetite change, fever and unexpected weight change. HENT: Negative for congestion, nosebleeds, rhinorrhea, sinus pressure, sneezing and voice change. Respiratory: Positive for shortness of breath. Negative for cough, choking, chest tightness and wheezing. Cardiovascular: Negative for chest pain, palpitations and leg swelling. Gastrointestinal: Negative for abdominal pain, constipation, diarrhea, nausea and vomiting. Genitourinary: Negative for difficulty urinating, discharge, dysuria, frequency and testicular pain. Musculoskeletal: Negative for back pain.    Skin: Negative for rash. Neurological: Negative for dizziness, weakness, light-headedness and headaches. Hematological: Does not bruise/bleed easily. Psychiatric/Behavioral: Negative for agitation, behavioral problems, confusion, self-injury, sleep disturbance and suicidal ideas. Objective :      Current Vitals : Temp: 97 °F (36.1 °C),  Pulse: 100, Resp: 18, BP: 109/69, SpO2: 95 %  Last 24 Hrs Vitals   Patient Vitals for the past 24 hrs:   BP Temp Temp src Pulse Resp SpO2 Weight   05/22/20 0400 109/69 97 °F (36.1 °C) Temporal 100 18 95 % 286 lb 8 oz (130 kg)   05/22/20 0000 (!) 95/58 97.2 °F (36.2 °C) Temporal 94 18 95 % --   05/21/20 2000 94/60 97.4 °F (36.3 °C) Temporal 100 18 91 % --   05/21/20 1600 -- 97.3 °F (36.3 °C) Temporal -- 18 -- --   05/21/20 1300 (!) 90/39 -- -- 62 -- 98 % --   05/21/20 1200 -- 97 °F (36.1 °C) Temporal 62 18 98 % --   05/21/20 0902 (!) 92/49 -- -- -- -- -- --   05/21/20 0800 (!) 105/58 96.9 °F (36.1 °C) Temporal 77 18 96 % --   05/21/20 0745 -- -- -- -- -- 94 % --     Intake / output   05/21 0701 - 05/22 0700  In: 1000 [P.O.:1000]  Out: -   Physical Exam:  Physical Exam  Vitals signs and nursing note reviewed. Constitutional:       General: He is not in acute distress. Appearance: He is ill-appearing. He is not diaphoretic. Interventions: Nasal cannula in place. HENT:      Head: Normocephalic and atraumatic. Nose:      Right Sinus: No maxillary sinus tenderness or frontal sinus tenderness. Left Sinus: No maxillary sinus tenderness or frontal sinus tenderness. Mouth/Throat:      Pharynx: No oropharyngeal exudate. Eyes:      General: No scleral icterus. Conjunctiva/sclera: Conjunctivae normal.      Pupils: Pupils are equal, round, and reactive to light. Neck:      Musculoskeletal: Full passive range of motion without pain and neck supple. Thyroid: No thyromegaly. Vascular: No JVD.       Comments: Left supraclavicular Lymphadenopathy Cardiovascular:      Rate and Rhythm: Normal rate and regular rhythm. Pulses:           Dorsalis pedis pulses are 2+ on the right side and 2+ on the left side. Heart sounds: Normal heart sounds. No murmur. Pulmonary:      Effort: Pulmonary effort is normal.      Breath sounds: Normal breath sounds. No wheezing or rales. Abdominal:      Palpations: Abdomen is soft. There is no mass. Tenderness: There is no abdominal tenderness. Musculoskeletal:      Right lower le+ Pitting Edema present. Left lower le+ Pitting Edema present. Lymphadenopathy:      Head:      Right side of head: No submandibular adenopathy. Left side of head: No submandibular adenopathy. Cervical: No cervical adenopathy. Skin:     General: Skin is warm. Neurological:      Mental Status: He is alert and oriented to person, place, and time. Motor: No tremor. Psychiatric:         Behavior: Behavior is cooperative. Laboratory findings:    Recent Labs     20  1145 20  1557 20  0430 20  0433   WBC  --  14.6*  --  9.4   HGB  --  13.0  --  11.4*   HCT  --  41.7  --  37.2*    220  --  193   INR 1.4 1.4 1.4  --      Recent Labs     20  0430 20  0450 20  0433    140 139   K 4.4 4.1 4.8   CL 98 98 99   CO2 29 29 25   GLUCOSE 124* 120* 155*   BUN 34* 32* 26*   CREATININE 0.86 0.83 0.65*   MG 2.3 2.4 2.2   CALCIUM 9.6 9.2 9.2     Recent Labs     20  0430 20  0450 20  0433   PROT 6.2* 5.7* 6.5   LABALBU 2.9* 2.7* 2.7*   TSH 0.79  --   --    AST 52* 51* 61*   ALT 18 16 8   ALKPHOS 59 55 59   BILITOT 0.71 0.71 0.57          No results found for: SPECGRAV, PROTEINU, RBCUA, BLOODU, BACTERIA, NITRU, WBCUA, LEUKOCYTESUR    Imaging / Clinical Data :-   Xr Chest Portable    Result Date: 2020  No significant change in the near complete opacification of the left hemithorax reflecting mass associated with effusion.      Xr Chest Portable    Result Date: 5/19/2020  Progressive opacification of the left hemithorax. Port catheter tip in the SVC with no pneumothorax. Ir Port Placement > 5 Years    Result Date: 5/19/2020  Successful ultrasound and fluoroscopy guided right internal jugular vein 8 [place] power injectable Port-A-Cath placement. Ready for use. Mri Brain W Wo Contrast    Result Date: 5/21/2020  Rounded focus of enhancement and restricted diffusion in the left frontal lobe concerning for metastatic disease. There is a smaller punctate enhancing focus medial to this in the left frontal lobe as well. Clinical Course : stable  Assessment and Plan  :        1. Left upper extremity DVT -Eliquis high-dose for 7 days followed by 5 mg twice daily. .  2. A. fib with RVR  - in Afib , rate controlled. continue  Tikosyn. 3. Malignant left lung cancer with mets to lymph nodes - s/p port placement.  follow up with oncology as planned on 5/26/20.  4. Postobstructive pneumonia -IV antibiotics  5. Acute respiratory failure with hypoxia as evidenced from dyspnea at rest and on treatment with respiratory distress.  Oxygen support,antibiotics, steroids  6. Essential hypertension-controlled         Discharge home with home care     Plan and updates discussed with patient ,  answers  explained to satisfaction.    Plan discussed with Staff Luz Naqvi     (Please note that portions of this note were completed with a voice recognition program. Efforts were made to edit the dictations but occasionally words are mis-transcribed.)      Pina Garcia MD  5/22/2020

## 2020-05-22 NOTE — FLOWSHEET NOTE
Patient is awake and alert while sitting up in a chair. Patient is approachable and engages in conversation. Patient shares about new diagnosis of lung cancer. Patient has spouse for support and denies any spiritual or emotional concerns. Writer provides listening presence and emotional support. Patient expresses appreciation for the visit. Spiritual Care will follow as needed. 05/22/20 1143   Encounter Summary   Services provided to: Patient   Referral/Consult From: Palliative Care;Rounding   Support System Spouse; Family members   Place of [address] [place] [address] Drive the [employer] No   Continue Visiting   (5/22/20)   Complexity of Encounter Moderate   Length of Encounter 15 minutes   Spiritual Assessment Completed Yes   Routine   Type Initial   Assessment Approachable   Intervention Active listening;Explored feelings, thoughts, concerns;Explored coping resources;Nurtured hope   Outcome Expressed gratitude

## 2020-05-22 NOTE — PROGRESS NOTES
Home Oxygen Evaluation    Home Oxygen Evaluation completed. Patient is on 3 liters per minute via nasal cannula upon arrival.  Resting SpO2 = 97%  Resting SpO2 on room air = 95%    SpO2 on room air with exercise = 94-95%  SpO2 on oxygen as above with exercise = 95-97%    Nocturnal Oximetry with patient on room air is recommended is SpO2 is between 89% and 95% (requires additional order).     Francisco Guthrie  11:31 AM

## 2020-05-22 NOTE — PROGRESS NOTES
Occupational Therapy  Facility/Department: [facility] CVICU  Daily Treatment Note  NAME: Sergio Queen  : 1945  MRN: [id number]    Date of Service: 2020    Discharge Recommendations:  Home with assist PRN, Home with Home health OT       Assessment   Performance deficits / Impairments: Decreased functional mobility ; Decreased ADL status; Decreased strength;Decreased safe awareness;Decreased balance;Decreased endurance;Decreased posture  Prognosis: Good  OT Education: OT Role;Energy Conservation;Plan of Care;Home Exercise Program;Precautions; ADL Adaptive Strategies;Transfer Training;Equipment; Family Education  Patient Education: EC/WS, home mgt, fall prevention, how to get up after a fall  Barriers to Learning: none  REQUIRES OT FOLLOW UP: Yes  Activity Tolerance  Activity Tolerance: Patient Tolerated treatment well  Safety Devices  Safety Devices in place: Yes  Type of devices: Call light within reach;Nurse notified;Gait belt;Patient at risk for falls; Left in chair         Patient Diagnosis(es): The primary encounter diagnosis was Hypoxia. Diagnoses of Small cell lung cancer ([facility] Atrial fibrillation with RVR ([address] and Acute deep vein thrombosis (DVT) of left upper extremity, unspecified vein ([address] were also pertinent to this visit. 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 has a past surgical history that includes Tunneled venous port placement (2020).     Restrictions  Restrictions/Precautions  Restrictions/Precautions: General Precautions, Fall Risk  Required Braces or Orthoses?: No  Position Activity Restriction  Other position/activity restrictions: IV, 3.5 L O2  Subjective General  Chart Reviewed: Yes  Patient assessed for rehabilitation services?: Yes  Response to previous treatment: Patient with no complaints from previous session  Family / Caregiver Present: No      Orientation  Orientation  Overall Orientation Status: Within Functional Limits  Objective    ADL  Additional Comments: Patient declined any needs this session, stated he wants to wait until he goes home to shower        Balance  Sitting Balance: Modified independent   Standing Balance: Supervision  Standing Balance  Time: standing tolerance ~3 minute for functional tasks  Comment: no LOB, easily fatigued  Functional Mobility  Functional - Mobility Device: No device  Activity: Other  Assist Level: Stand by assistance  Functional Mobility Comments: verbal cues for line awareness  Bed mobility  Comment: patient up in chair upon arrival  Transfers  Sit to stand: Stand by assistance  Stand to sit: Stand by assistance  Transfer Comments: Verbal cues for hand placment and controlled sit<>stand                       Cognition  Overall Cognitive Status: [facility]                                         Plan   Plan  Times per week: 4-5x/week, 1-2x/day  Current Treatment Recommendations: Strengthening, Balance Training, Functional Mobility Training, Endurance Training, Equipment Evaluation, Education, & procurement, Patient/Caregiver Education & Training, Self-Care / ADL, Safety Education & Training, Positioning     OutComes Score  [place] Balance Score: 43 (05/22/20 1154)                                               AM-PAC Score        AM-PAC Inpatient Daily Activity Raw Score: 17 (05/22/20 1236)  AM-PAC Inpatient ADL T-Scale Score : 37.26 (05/22/20 1236)  ADL Inpatient CMS 0-100% Score: 50.11 (05/22/20 1236)  ADL Inpatient CMS G-Code Modifier : CK (05/22/20 1236)    Goals  Short term goals  Time Frame for Short term goals: by discharge, pt will  Short term goal 1: demo SBA with ADL transfers with AD/DME as needed and good safety  Short term goal 2: demo SBA with functional mob in room with good safety/pacing and AD as needed  Short term goal 3: demo SBA with toileting routine  Short term goal 4: demo I with UB ADLs and SBA with LB ADLs with AE/DME as needed and with good safety/pacing  Short term goal 5: demo and verb good understanding of fall prevention techs, EC/WS techs, and possible equip needs for home  Patient Goals   Patient goals : to go home       Therapy Time   Individual Concurrent Group Co-treatment   Time In 1153         Time Out 1216         Minutes 23           Upon writer exit, call light within reach, pt retired to chair. All lines intact and patient positioned comfortably. All patient needs addressed prior to ending therapy session. Chart reviewed prior to treatment and patient is agreeable for therapy. RN reports patient is medically stable for therapy treatment this date.       SHANE Kolb/ZULMA

## 2020-05-23 PROBLEM — N17.9 AKI (ACUTE KIDNEY INJURY) (HCC): Status: ACTIVE | Noted: 2020-01-01

## 2020-05-23 NOTE — ED PROVIDER NOTES
EMERGENCY DEPARTMENT ENCOUNTER    Pt Name: Andreia Singh  MRN: [id number]  [place] 1945  Date of evaluation: 5/23/20  CHIEF COMPLAINT       Chief Complaint   Patient presents with    Shortness of Breath     RECENT DIAGNOSIS lUNG CANCER     HISTORY OF PRESENT ILLNESS   This is a 76-year-old male with a recent diagnosis of lung cancer. Patient states that he was up coughing throughout the entire night and was unable to sleep, he states that he was coughing and having some hemoptysis. Patient states that he was recently admitted for evaluation, he was diagnosed with a blood clot in his left upper extremity patient was started on Eliquis and discharged home. Patient denies any fevers or chills but has had some left-sided chest pain. He has not started chemotherapy. REVIEW OF SYSTEMS     Review of Systems   Constitutional: Negative for chills and fever. HENT: Negative for rhinorrhea and sore throat. Eyes: Negative for discharge, redness and visual disturbance. Respiratory: Positive for cough and shortness of breath. Cardiovascular: Positive for chest pain. Negative for palpitations and leg swelling. Gastrointestinal: Negative for diarrhea, nausea and vomiting. Genitourinary: Negative for dysuria and hematuria. Musculoskeletal: Negative for arthralgias, myalgias and neck pain. Skin: Negative for color change and rash. Neurological: Negative for seizures, weakness and headaches. Psychiatric/Behavioral: Negative for hallucinations, self-injury and suicidal ideas.      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 (HCC)     Supraclavicular lymphadenopathy      Past Problem List  Patient Active Problem List   Diagnosis Code    Essential hypertension I10    IGT (impaired glucose tolerance) R73.02    Hypertriglyceridemia E78.1    Tobacco abuse Z72.0    Pure hypercholesterolemia E78.00    New onset [facility]-fib ([facility] I48.91    Dyslipidemia E78.5    Mass of thoracic structure R22.2    Class 2 severe obesity due to excess calories with serious comorbidity in adult ([facility] E66.01    Lymphadenopathy R59.1    Small cell lung cancer (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 J18.1     SURGICAL HISTORY       Past Surgical History:   Procedure Laterality Date    TUNNELED VENOUS PORT PLACEMENT  05/19/2020    IR PORT PLACEMENT EQUAL OR GREATER THAN 5 YEARS     CURRENT MEDICATIONS       Previous Medications    APIXABAN (ELIQUIS DVT/PE STARTER PACK) 5 MG TABS TABLET    Take 10 mg (2 tablets) orally twice daily for 7 days, then take 5 mg (1 tablet) orally twice daily thereafter.     ASPIRIN 81 MG CHEWABLE TABLET    Take 1 tablet by mouth daily    ATORVASTATIN (LIPITOR) 20 MG TABLET    TAKE 1 TABLET BY MOUTH ONE TIME A DAY    BENZONATATE (TESSALON) 200 MG CAPSULE    Take 1 capsule by mouth 3 times daily as needed for Cough    BUDESONIDE-FORMOTEROL (SYMBICORT) 160-4.5 MCG/ACT AERO    Inhale 2 puffs into the lungs 2 times daily    DEXAMETHASONE (DECADRON) 4 MG TABLET    Take 1 tablet by mouth every 8 hours for 10 days    DILTIAZEM (CARDIZEM CD) 240 MG EXTENDED RELEASE CAPSULE    Take 1 capsule by mouth daily    DOFETILIDE (TIKOSYN) 500 MCG CAPSULE    Take 1 capsule by mouth every 12 hours    ELASTIC BANDAGES & SUPPORTS (JOBST KNEE HIGH COMPRESSION SM) MISC    1 each by Does not apply route daily as needed (lower ext edema)    ENALAPRIL (VASOTEC) 20 MG TABLET    TAKE 1 TABLET DAILY    FUROSEMIDE (LASIX) 20 MG TABLET    Take 1 tablet by mouth daily    LEVOFLOXACIN (LEVAQUIN) 750 MG TABLET    Take 1 tablet by mouth daily for 5 days    LIDOCAINE-PRILOCAINE (EMLA) 2.5-2.5 % CREAM    Apply topically as needed. METOPROLOL SUCCINATE (TOPROL XL) 100 MG EXTENDED RELEASE TABLET    TAKE 1 TABLET DAILY    OMEPRAZOLE (PRILOSEC) 20 MG DELAYED RELEASE CAPSULE    Take 1 capsule by mouth daily    ONDANSETRON (ZOFRAN ODT) 8 MG TBDP DISINTEGRATING TABLET    Place 1 tablet under the tongue every 8 hours as needed for Nausea or Vomiting     ALLERGIES     has No Known Allergies. FAMILY HISTORY     He indicated that his mother is . He indicated that his father is . He indicated that the status of his sister is unknown. SOCIAL HISTORY       Social History     Tobacco Use    Smoking status: Former Smoker     Packs/day: 1.00     Years: 50.00     Pack years: 50.00     Types: Cigars, Cigarettes     Last attempt to quit: 2020     Years since quittin.0    Smokeless tobacco: Never Used   Substance Use Topics    Alcohol use: Not Currently     Comment: rare    Drug use: No     PHYSICAL EXAM     INITIAL VITALS: /76   Pulse 101   Temp 97.7 °F (36.5 °C) (Oral)   Resp 22   Ht 6' 2\" (1.88 m)   Wt 288 lb (130.6 kg)   SpO2 98%   BMI 36.98 kg/m²    Physical Exam  Constitutional:       Appearance: Normal appearance. He is well-developed. He is not ill-appearing or toxic-appearing. HENT:      Head: Normocephalic and atraumatic. Eyes:      Conjunctiva/sclera: Conjunctivae normal.      Pupils: Pupils are equal, round, and reactive to light. Neck:      Musculoskeletal: Normal range of motion and neck supple. Trachea: Trachea normal.   Cardiovascular:      Rate and Rhythm: Normal rate and regular rhythm. Heart sounds: S1 normal and S2 normal. No murmur.    Pulmonary:      Effort: Pulmonary effort is lymphadenopathy involving the mediastinal left hilar   regions, grossly similar to the prior study. 4. Moderate left-sided pleural effusion. XR CHEST PORTABLE   Final Result   Overall, is been no significant change in chest findings compared to the May   21, 2020 study. Near complete whiteout of the left hemithorax is seen likely   related to obstruction/lung atelectasis by the patient's known lung cancer. LABS: All lab results were reviewed by myself, and all abnormals are listed below.   Labs Reviewed   CBC WITH AUTO DIFFERENTIAL - Abnormal; Notable for the following components:       Result Value    WBC 16.7 (*)     RBC 3.80 (*)     Hemoglobin 11.2 (*)     Hematocrit 35.5 (*)     RDW 15.2 (*)     Seg Neutrophils 88 (*)     Lymphocytes 4 (*)     Eosinophils % 0 (*)     Immature Granulocytes 1 (*)     Segs Absolute 14.69 (*)     Absolute Lymph # 0.67 (*)     Absolute Mono # 1.17 (*)     All other components within normal limits   LACTIC ACID - Abnormal; Notable for the following components:    Lactic Acid 4.3 (*)     All other components within normal limits   BASIC METABOLIC PANEL - Abnormal; Notable for the following components:    Glucose 207 (*)     BUN 44 (*)     CREATININE 1.34 (*)     Bun/Cre Ratio 33 (*)     Chloride 96 (*)     Anion Gap 18 (*)     GFR Non- 52 (*)     All other components within normal limits   LACTIC ACID - Abnormal; Notable for the following components:    Lactic Acid 4.5 (*)     All other components within normal limits   TROPONIN   SPECIMEN REJECTION   MAGNESIUM   TROPONIN       EMERGENCY DEPARTMENTCOURSE:         Vitals:    Vitals:    05/23/20 0735   BP: 138/76   Pulse: 101   Resp: 22   Temp: 97.7 °F (36.5 °C)   TempSrc: Oral   SpO2: 98%   Weight: 288 lb (130.6 kg)   Height: 6' 2\" (1.88 m)       The patient was given the following medications while in the emergency department:  Orders Placed This Encounter   Medications    0.9 % sodium chloride

## 2020-05-23 NOTE — PROGRESS NOTES
Pt admitted to current room from ER via with ER RN. Verbal report received from ER RN. Pt transferred to bed independently / with staff assist.  Telemetry monitor applied. Oxygen at  2 L nasal cannula . VS as charted. Pt appears SOB after transfer from stretcher to bed. Pt oriented to unit, room, call light and bed controls. Verbal safety instructions provided- pt verbalized understanding. Informed pt of plan of care. Pt denies needs. Will monitor pt closely.

## 2020-05-23 NOTE — H&P
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 Authorizing Provider   aspirin 81 MG chewable tablet Take 1 tablet by mouth daily 5/23/20   Josias Albert MD   budesonide-formoterol [facility]) 160-4.5 MCG/ACT AERO Inhale 2 puffs into the lungs 2 times daily 5/22/20   Josias Albert MD   dofetilide (TIKOSYN) 500 MCG capsule Take 1 capsule by mouth every 12 hours 5/22/20   Josias Albert MD   levoFLOXacin (LEVAQUIN) 750 MG tablet Take 1 tablet by mouth daily for 5 days 5/22/20 5/27/20  Josias Albert MD   dexamethasone (DECADRON) 4 MG tablet Take 1 tablet by mouth every 8 hours for 10 days 5/22/20 6/1/20  Josias Albert MD   apixaban Brittney  DVT/PE STARTER PACK) 5 MG TABS tablet Take 10 mg (2 tablets) orally twice daily for 7 days, then take 5 mg (1 tablet) orally twice daily thereafter. 5/22/20   Josias Albert MD   benzonatate (TESSALON) 200 MG capsule Take 1 capsule by mouth 3 times daily as needed for Cough 5/15/20   Juwan Fontaine MD   omeprazole (PRILOSEC) 20 MG delayed release capsule Take 1 capsule by mouth daily 5/11/20   Rufina Otero MD   ondansetron (ZOFRAN ODT) 8 MG TBDP disintegrating tablet Place 1 tablet under the tongue every 8 hours as needed for Nausea or Vomiting 5/11/20   Anoop Otero MD   lidocaine-prilocaine (EMLA) 2.5-2.5 % cream Apply topically as needed.  5/11/20   Alisha Falcon MD for rash. Neurological: Negative for dizziness, weakness, light-headedness and headaches. Hematological: Does not bruise/bleed easily. Psychiatric/Behavioral: Negative for agitation, behavioral problems, confusion, self-injury, sleep disturbance and suicidal ideas. Physical Exam:   /66   Pulse 93   Temp 97.3 °F (36.3 °C) (Oral)   Resp 24   Ht 6' 2\" (1.88 m)   Wt 288 lb (130.6 kg)   SpO2 96%   BMI 36.98 kg/m²   Temp (24hrs), Av.5 °F (36.4 °C), Min:97.3 °F (36.3 °C), Max:97.7 °F (36.5 °C)    No results for input(s): POCGLU in the last 72 hours. No intake or output data in the 24 hours ending 20 1623  Physical Exam  Vitals signs and nursing note reviewed. Constitutional:       General: He is not in acute distress. Appearance: He is ill-appearing. He is not diaphoretic. Interventions: Nasal cannula in place. HENT:      Head: Normocephalic and atraumatic. Nose:      Right Sinus: No maxillary sinus tenderness or frontal sinus tenderness. Left Sinus: No maxillary sinus tenderness or frontal sinus tenderness. Mouth/Throat:      Pharynx: No oropharyngeal exudate. Eyes:      General: No scleral icterus. Conjunctiva/sclera: Conjunctivae normal.      Pupils: Pupils are equal, round, and reactive to light. Neck:      Musculoskeletal: Full passive range of motion without pain and neck supple. Thyroid: No thyromegaly. Vascular: No JVD. Cardiovascular:      Rate and Rhythm: Normal rate. Rhythm irregular. Pulses:           Dorsalis pedis pulses are 2+ on the right side and 2+ on the left side. Heart sounds: Normal heart sounds. No murmur. Pulmonary:      Effort: Pulmonary effort is normal.      Breath sounds: Normal breath sounds. No wheezing or rales. Abdominal:      Palpations: Abdomen is soft. There is no mass. Tenderness: There is no abdominal tenderness.    Lymphadenopathy:      Head:      Right side of head: No submandibular adenopathy. Left side of head: No submandibular adenopathy. Cervical: No cervical adenopathy. Upper Body:      Left upper body: Supraclavicular adenopathy present. Skin:     General: Skin is warm. Neurological:      Mental Status: He is alert and oriented to person, place, and time. Motor: No tremor. Psychiatric:         Behavior: Behavior is cooperative. Investigations:      Laboratory Testing:  Recent Results (from the past 24 hour(s))   EKG 12 Lead    Collection Time: 05/23/20  7:44 AM   Result Value Ref Range    Ventricular Rate 99 BPM    Atrial Rate 99 BPM    P-R Interval 154 ms    QRS Duration 102 ms    Q-T Interval 364 ms    QTc Calculation (Bazett) 467 ms    P Axis 51 degrees    R Axis 26 degrees    T Axis -157 degrees   CBC Auto Differential    Collection Time: 05/23/20  7:54 AM   Result Value Ref Range    WBC 16.7 (H) 3.5 - 11.3 k/uL    RBC 3.80 (L) 4.21 - 5.77 m/uL    Hemoglobin 11.2 (L) 13.0 - 17.0 g/dL    Hematocrit 35.5 (L) 40.7 - 50.3 %    MCV 93.4 82.6 - 102.9 fL    MCH 29.5 25.2 - 33.5 pg    MCHC 31.5 28.4 - 34.8 g/dL    RDW 15.2 (H) 11.8 - 14.4 %    Platelets 688 032 - 494 k/uL    MPV 12.1 8.1 - 13.5 fL    NRBC Automated 0.0 0.0 per 100 WBC    Differential Type NOT REPORTED     WBC Morphology NOT REPORTED     RBC Morphology NOT REPORTED     Platelet Estimate NOT REPORTED     Seg Neutrophils 88 (H) 36 - 66 %    Lymphocytes 4 (L) 24 - 44 %    Monocytes 7 1 - 7 %    Eosinophils % 0 (L) 1 - 4 %    Basophils 0 %    Immature Granulocytes 1 (H) 0 %    Segs Absolute 14.69 (H) 1.8 - 7.7 k/uL    Absolute Lymph # 0.67 (L) 1.0 - 4.8 k/uL    Absolute Mono # 1.17 (H) 0.2 - 0.8 k/uL    Absolute Eos # 0.00 0.0 - 0.4 k/uL    Basophils Absolute 0.00 0.0 - 0.2 k/uL    Absolute Immature Granulocyte 0.17 0.00 - 0.30 k/uL   SPECIMEN REJECTION    Collection Time: 05/23/20  7:54 AM   Result Value Ref Range    Specimen Source . BLOOD     Ordered Test BMP MG TROPI LAC     Reason for Rejection Unable to perform testing: Specimen hemolyzed. - NOT REPORTED    Basic Metabolic Panel    Collection Time: 05/23/20  8:18 AM   Result Value Ref Range    Glucose 207 (H) 70 - 99 mg/dL    BUN 44 (H) 8 - 23 mg/dL    CREATININE 1.34 (H) 0.70 - 1.20 mg/dL    Bun/Cre Ratio 33 (H) 9 - 20    Calcium 9.7 8.6 - 10.4 mg/dL    Sodium 137 135 - 144 mmol/L    Potassium 4.7 3.7 - 5.3 mmol/L    Chloride 96 (L) 98 - 107 mmol/L    CO2 23 20 - 31 mmol/L    Anion Gap 18 (H) 9 - 17 mmol/L    GFR Non-African American 52 (L) >60 mL/min    GFR African American >60 >60 mL/min    GFR Comment          GFR Staging NOT REPORTED    Lactic Acid    Collection Time: 05/23/20  8:18 AM   Result Value Ref Range    Lactic Acid 4.5 (H) 0.5 - 2.2 mmol/L   Magnesium    Collection Time: 05/23/20  8:18 AM   Result Value Ref Range    Magnesium 2.3 1.6 - 2.6 mg/dL   Troponin    Collection Time: 05/23/20  8:18 AM   Result Value Ref Range    Troponin, High Sensitivity 19 0 - 22 ng/L    Troponin T NOT REPORTED <0.03 ng/mL    Troponin Interp NOT REPORTED    Lactic Acid    Collection Time: 05/23/20 10:21 AM   Result Value Ref Range    Lactic Acid 4.3 (H) 0.5 - 2.2 mmol/L   Troponin    Collection Time: 05/23/20 10:21 AM   Result Value Ref Range    Troponin, High Sensitivity 19 0 - 22 ng/L    Troponin T NOT REPORTED <0.03 ng/mL    Troponin Interp NOT REPORTED        Imaging/Diagonstics:    Xr Chest Portable    Result Date: 5/23/2020  Overall, is been no significant change in chest findings compared to the May 21, 2020 study. Near complete whiteout of the left hemithorax is seen likely related to obstruction/lung atelectasis by the patient's known lung cancer. Xr Chest Portable    Result Date: 5/21/2020  No significant change in the near complete opacification of the left hemithorax reflecting mass associated with effusion. Xr Chest Portable    Result Date: 5/19/2020  Progressive opacification of the left hemithorax.  Port catheter tip in the port in place. Awaiting chemotherapy. 6. Reactive leukocytosis secondary Decadron. 7. Left subclavian DVT-on Eliquis 10 g twice daily, elevate arm      Patient has scheduled chemotherapy on 5/26/2020. And multiple admissions. Will consult oncology to see if chemotherapy can be started inpatient. Consultations:   IP CONSULT TO HOSPITALIST    Patient is admitted as inpatient status because of co-morbidities listed above, severity of signs and symptoms as outlined, requirement for current medical therapies and most importantly because of direct risk to patient if care not provided in a hospital setting.     Kati Castro MD  5/23/2020    Copy sent to Dr. Daly Becerra MD    (Please note that portions of this note were completed with a voice recognition program. Efforts were made to edit the dictations but occasionally words are mis-transcribed.)

## 2020-05-23 NOTE — ED NOTES
Bed: 23  Expected date: 5/23/20  Expected time: 7:18 AM  Means of arrival:   Comments:  [facility]  05/23/20 2037

## 2020-05-23 NOTE — ED NOTES
Patient brought in by EMS for SOB. Patient verbalizing that he was recently diagnosed with Lung cancer on May 1. Patient states was just discharged yesterday. Patient states was unable to walk across room without dyspnea. Patient had new port placed and waiting on orders for chemo. Patient unsure of staging of new diagnosis of Cancer. Patient also has a blood clot in the left arm and on Eliquis. Patient states when coughing has hemoptysis.      Mathieu Christine RN  05/23/20 0275

## 2020-05-23 NOTE — FLOWSHEET NOTE
05/23/20 1539   Provider Notification   Reason for Communication Review case  (pt nsr on admit current afib 110-120's)   Provider Name Dr. Sheila Huddleston   Provider Notification Physician   Method of Communication Face to face   Response No new orders   Notification Time [id number]    Currently pt in a-fib. Pt had not taken home medications until admitted to floor. Dr. Sheila Huddleston updated,  no new orders at this time.

## 2020-05-24 PROBLEM — R79.1 SUPRATHERAPEUTIC INR: Status: ACTIVE | Noted: 2020-01-01

## 2020-05-24 PROBLEM — E66.9 OBESITY (BMI 30-39.9): Status: ACTIVE | Noted: 2020-01-01

## 2020-05-24 PROBLEM — J90 PLEURAL EFFUSION ON LEFT: Status: ACTIVE | Noted: 2020-01-01

## 2020-05-24 PROBLEM — E44.1 MILD MALNUTRITION (HCC): Status: ACTIVE | Noted: 2020-01-01

## 2020-05-24 NOTE — PROGRESS NOTES
Nutrition Assessment    Type and Reason for Visit: Positive Nutrition Screen(weight loss, appetite)    Nutrition Recommendations:   1. Continue general diet, modify to chopped meats  2. Encourage PO intake  3. Start Ensure Enlive (strawberry) BID  4. Monitor PO intakes, weight    Nutrition Assessment: Patient at nutrition risk related to decreased appetite/loss of taste, suboptimal PO intakes, weight loss, diagnosis of metastatic lung cancer, and being edentulous (only has 5 teeth). Patient stated he drinks all his liquids, however gets tired while chewing food (eats 25-50% of meals). Weight loss was intentional x 1.5 years, however was likely exacerbated in recent month by recent hospital admissions and decreased appetite. Encouraged pt. to take time with meals. Will provide Ensure Enlive (strawberry) BID and order chopped meats on tray. Will monitor PO intakes and nutrition status. Malnutrition Assessment:  · Malnutrition Status: Mild Malnutrition  · Context: Acute illness or injury  · Findings of the 6 clinical characteristics of malnutrition (Minimum of 2 out of 6 clinical characteristics is required to make the diagnosis of moderate or severe Protein Calorie Malnutrition based on AND/ASPEN Guidelines):  1. Energy Intake-Less than or equal to 75% of estimated energy requirement, Greater than or equal to 7 days    2. Weight Loss-2% loss or greater, in 1 month  3. Fat Loss-No significant subcutaneous fat loss,    4. Muscle Loss-Unable to assess,    5. Fluid Accumulation-Mild fluid accumulation, Extremities, Generalized  6.  Strength-Not measured    Nutrition Risk Level:  Moderate    Nutrient Needs:  · Estimated Daily Total Kcal: 6452-1696 kcal (16-17 kcal/kg)  · Estimated Daily Protein (g): 138-155 g (1.6-1.8 g/kg)  · Estimated Daily Total Fluid (ml/day): 1 mL/kcal    Nutrition Diagnosis:   · Problem: Inadequate oral intake  · Etiology: related to Early satiety, Other (Comment)(poor appetite, loss of

## 2020-05-24 NOTE — PLAN OF CARE
Problem: Falls - Risk of:  Goal: Will remain free from falls  Description: Will remain free from falls  5/23/2020 2213 by Constantino Wu RN  Outcome: Ongoing  5/23/2020 1710 by Kaylie Schmitt RN  Outcome: Met This Shift  Goal: Absence of physical injury  Description: Absence of physical injury  Outcome: Ongoing     Problem: Breathing Pattern - Ineffective  Goal: Effective breathing pattern  5/23/2020 2213 by Constantino Wu RN  Outcome: Ongoing  5/23/2020 1710 by Kaylie Schmitt RN  Outcome: Ongoing

## 2020-05-24 NOTE — PROGRESS NOTES
at discharge. He also had leukocytosis 16.7. Patient is currently on dexamethasone. CT chest was repeated and showed interval progression of lung mass.      Patient is awaiting chemotherapy. He had port placed prior to the hospitalization. 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 Review of Systems:     Constitutional:  negative for chills, fevers, sweats  Respiratory: positive for profound shortness of breath, coughing, and hemoptysis. negative for wheezing  Cardiovascular:  negative for chest pain, chest pressure/discomfort, lower extremity edema, palpitations  Gastrointestinal:  negative for abdominal pain, constipation, diarrhea, nausea, vomiting  Neurological:  negative for dizziness, headache    Medications:      Allergies:  No Known Allergies    Current Meds:   Scheduled Meds:    apixaban  10 mg Oral BID    aspirin  81 mg Oral Daily    atorvastatin  40 mg Oral Nightly    budesonide-formoterol  2 puff Inhalation BID    dexamethasone  4 mg Oral Q8H    dilTIAZem  240 mg Oral Daily    dofetilide  500 mcg Oral 2 times per day    metoprolol succinate  100 mg Oral Daily    pantoprazole  40 mg Oral QAM AC    sodium chloride flush  10 mL Intravenous 2 times per day     Continuous Infusions:    sodium chloride 75 mL/hr at 05/24/20 0940     PRN Meds: benzonatate, sodium chloride flush, potassium chloride **OR** potassium alternative oral replacement **OR** potassium chloride, magnesium sulfate, acetaminophen **OR** acetaminophen, polyethylene glycol, promethazine **OR** ondansetron, nicotine    Data:     Past Medical History:   has a past medical history of Atrial fibrillation with RVR ([facility] Cancer ([facility] [facility] COPD (chronic obstructive pulmonary disease) ([facility] [facility] Hx of blood clots, Hyperlipidemia, Hypertension, IGT (impaired glucose tolerance), Kidney stones, Lung mass, 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 Social History:   reports that he quit smoking about 3 weeks ago. His smoking use included cigars and cigarettes. He has a 50.00 pack-year smoking history. He has never used smokeless tobacco. He reports previous alcohol use. He reports that he does not use drugs. Family History:   Family History   Problem Relation Age of Onset    Hemochromatosis Sister     Heart Disease Mother     Heart Disease Father        Vitals:  BP (!) 112/55   Pulse 81   Temp 96.8 °F (36 °C) (Temporal)   Resp 16   Ht 6' 2\" (1.88 m)   Wt 289 lb 6.4 oz (131.3 kg)   SpO2 93%   BMI 37.16 kg/m²   Temp (24hrs), Av.4 °F (36.3 °C), Min:96.8 °F (36 °C), Max:97.9 °F (36.6 °C)    No results for input(s): POCGLU in the last 72 hours. I/O (24Hr):     Intake/Output Summary (Last 24 hours) at 2020 0945  Last data filed at 2020 7575  Gross per 24 hour   Intake 350 ml   Output 200 ml   Net 150 ml       Labs:  Hematology:  Recent Labs     20  0433 20  0754 20  0310   WBC 9.4 16.7* 14.1*   RBC 3.90* 3.80* 3.29*   HGB 11.4* 11.2* 9.5*   HCT 37.2* 35.5* 31.2*   MCV 95.4 93.4 94.8   MCH 29.2 29.5 28.9   MCHC 30.6 31.5 30.4   RDW 14.9* 15.2* 15.4*    272 212   MPV 11.8 12.1 11.8   INR  --   --  4.3     Chemistry:  Recent Labs     20  0433 20  0818 20  1021 20  0310    137  --  135   K 4.8 4.7  --  4.8   CL 99 96*  --  96*   CO2 25 23  --  23   GLUCOSE 155* 207*  --  166*   BUN 26* 44*  --  57*   CREATININE 0.65* 1.34*  --  1.38*   MG 2.2 2.3  --   --    ANIONGAP 15 18*  --  16   LABGLOM >60 52*  --  50*   GFRAA >60 >60  --  >60   CALCIUM 9.2 9.7  --  9.1   TROPHS  --    --      Recent Labs     20  0433   PROT 6.5   LABALBU 2.7*   AST 61*   ALT 8   ALKPHOS 59   BILITOT 0.57     ABG:No results found for: POCPH, PHART, PH, POCPCO2, ZBA8YMD, PCO2, POCPO2, PO2ART, PO2, POCHCO3, YWQ9PES, HCO3, NBEA, PBEA, BEART, BE, THGBART, THB, MIN4XKB, rhythm, no murmur  Chest wall: right-sided port present  Abdomen:  soft, nontender, nondistended, normal bowel sounds, no masses, hepatomegaly, splenomegaly  Extremities:  no redness or tenderness in the calves; left lower extremity > right lower extremity; left upper extremity > right upper extremity; no pitting edema noted  Skin:  Multiple ecchymoses scattered throughout upper arms and on right anterior chest wall    Assessment:        Hospital Problems           Last Modified POA    * (Principal) KRISTINA (acute kidney injury) ([address] 5/24/2020 Yes    PAF (paroxysmal atrial fibrillation) ([address] 5/24/2020 Yes    Small cell lung cancer ([address] 5/24/2020 Yes    Acute respiratory failure with hypoxia ([address] 5/24/2020 Yes    Pneumonia of left lower lobe due to infectious organism ([address] 5/24/2020 Yes    Supratherapeutic INR 5/24/2020 Yes    Pleural effusion on left 5/24/2020 Yes    Essential hypertension 5/24/2020 Yes    IGT (impaired glucose tolerance) (Chronic) 5/24/2020 Yes    Tobacco abuse (Chronic) 5/24/2020 Yes    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 9) 5/24/2020 Yes          Plan:        1. Appreciate oncology recommendations  2. Consultation to pulmonology today; he may benefit from left thoracentesis  3. Supplemental O2 to maintain SpO2 > 90%  4. Agree with pulmonology regarding discontinuation of Eliquis at this time; we will need to weigh the risk/benefit of this moving forward. 5. Hold parameters placed on anti-hypertensives at this time. I am concerned that his KRISTINA may be due to hypotension at home. He is on Diltiazem and Metoprolol for BP/HR control  6. Continue to trend lactic acid until less than 2.0  7. Check urine studies, bladder scans; uric acid at 10.5, CPK not markedly elevated  8. Hold off on renal ultrasound or nephrology consultations at this time  9.  Strict

## 2020-05-24 NOTE — CONSULTS
Pulmonary Medicine and Critical Care Consult    Yasmin - Alise Watson   MRN -  [id number]   Acct # - [de-identified]   - 1945      Date of Admission -  2020  7:34 AM  Date of evaluation -  2020  Room -    Hospital Day - [address] [place] [address] Primary Care Physician - Nikunj Lyons MD     Reason for Consult      Dyspnea/hemoptysis  Assessment   · Acute respiratory insufficiency  · Acute Exacerbation of COPD  · advanced lung cancer with supraclavicular lymphadenopathy and brain metastasis on Decadron  · Left pleural effusion with significant atelectasis most likely malignant  · Hemoptysis on anticoagulation suspect endobronchial lesion  · Possible postobstructive pneumonia  · Left upper arm DVT/left lower extremity edema  · Probable obstructive sleep apnea/Obesity  · Smoker recently quit  · Acute renal insufficiency  · Increased INR on Eliquis      Recommendations   · Oxygen by nasal cannula  · Incentive spirometry every hour while awake  · DuoNeb by nebulizer  · Symbicort  · Decadron 4 p.o. every 8 hours  · Switch Levaquin to Zosyn check procalcitonin  · Radiation oncology evaluation  · Left thoracentesis by IR; will see if it affects lung expansion/dyspnea  · Hold Eliquis  · Monitor hemoptysis and hemoglobin  · Check lower extremity venous Dopplers  · IV fluids  · Nephrology consult  · Cardiac meds restarted per primary  · Oncology consult  · poor prognosis  · Consider palliative care consult    Problem List      Patient Active Problem List   Diagnosis    Essential hypertension    IGT (impaired glucose tolerance)    Hypertriglyceridemia    Tobacco abuse    Pure hypercholesterolemia    PAF (paroxysmal atrial fibrillation) (HCC)    Dyslipidemia    Class 2 severe obesity due to excess calories with serious comorbidity in adult ([address]    Lymphadenopathy    Small cell lung cancer ([address]    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 ([address]    Metastasis to  TUNNELED VENOUS PORT PLACEMENT  05/19/2020    IR PORT PLACEMENT EQUAL OR GREATER THAN 5 YEARS       Meds    Current Medications    guaiFENesin  600 mg Oral BID    apixaban  10 mg Oral BID    aspirin  81 mg Oral Daily    atorvastatin  40 mg Oral Nightly    budesonide-formoterol  2 puff Inhalation BID    dexamethasone  4 mg Oral Q8H    dilTIAZem  240 mg Oral Daily    dofetilide  500 mcg Oral 2 times per day    metoprolol succinate  100 mg Oral Daily    pantoprazole  40 mg Oral QAM AC    sodium chloride flush  10 mL Intravenous 2 times per day     benzonatate, sodium chloride flush, potassium chloride **OR** potassium alternative oral replacement **OR** potassium chloride, magnesium sulfate, acetaminophen **OR** acetaminophen, polyethylene glycol, promethazine **OR** ondansetron, nicotine  IV Drips/Infusions   sodium chloride 75 mL/hr at 05/24/20 0940     Home Medications  Medications Prior to Admission: aspirin 81 MG chewable tablet, Take 1 tablet by mouth daily  budesonide-formoterol (SYMBICORT) 160-4.5 MCG/ACT AERO, Inhale 2 puffs into the lungs 2 times daily  dofetilide (TIKOSYN) 500 MCG capsule, Take 1 capsule by mouth every 12 hours  levoFLOXacin (LEVAQUIN) 750 MG tablet, Take 1 tablet by mouth daily for 5 days  dexamethasone (DECADRON) 4 MG tablet, Take 1 tablet by mouth every 8 hours for 10 days  apixaban (ELIQUIS DVT/PE STARTER PACK) 5 MG TABS tablet, Take 10 mg (2 tablets) orally twice daily for 7 days, then take 5 mg (1 tablet) orally twice daily thereafter. benzonatate (TESSALON) 200 MG capsule, Take 1 capsule by mouth 3 times daily as needed for Cough  omeprazole (PRILOSEC) 20 MG delayed release capsule, Take 1 capsule by mouth daily  ondansetron (ZOFRAN ODT) 8 MG TBDP disintegrating tablet, Place 1 tablet under the tongue every 8 hours as needed for Nausea or Vomiting  lidocaine-prilocaine (EMLA) 2.5-2.5 % cream, Apply topically as needed.   dilTIAZem (CARDIZEM CD) 240 MG extended release grossly intact. There are no focal motor  deficits  Skin - No bruising or bleeding  Extremities - No cyanosis, clubbing or edema    Labs  - Old records and notes have been reviewed in [facility]   CBC     Lab Results   Component Value Date    WBC 14.1 05/24/2020    RBC 3.29 05/24/2020    RBC 5.30 03/14/2012    HGB 9.5 05/24/2020    HCT 31.2 05/24/2020     05/24/2020     03/14/2012    MCV 94.8 05/24/2020    MCH 28.9 05/24/2020    MCHC 30.4 05/24/2020    RDW 15.4 05/24/2020    LYMPHOPCT 4 05/23/2020    MONOPCT 7 05/23/2020    BASOPCT 0 05/23/2020    MONOSABS 1.17 05/23/2020    LYMPHSABS 0.67 05/23/2020    EOSABS 0.00 05/23/2020    BASOSABS 0.00 05/23/2020    DIFFTYPE NOT REPORTED 05/23/2020     BMP   Lab Results   Component Value Date     05/24/2020    K 4.8 05/24/2020    CL 96 05/24/2020    CO2 23 05/24/2020    BUN 57 05/24/2020    CREATININE 1.38 05/24/2020    GLUCOSE 166 05/24/2020    GLUCOSE 112 03/14/2012    CALCIUM 9.1 05/24/2020    MG 2.3 05/23/2020     LFTS  Lab Results   Component Value Date    ALKPHOS 59 05/22/2020    ALT 8 05/22/2020    AST 61 05/22/2020    PROT 6.5 05/22/2020    BILITOT 0.57 05/22/2020    BILIDIR 0.10 07/05/2017    IBILI 0.30 07/05/2017    LABALBU 2.7 05/22/2020    LABALBU 4.2 03/14/2012       Lab Results   Component Value Date    APTT 31.9 05/19/2020     INR   Lab Results   Component Value Date    INR 4.3 05/24/2020    INR 1.4 05/20/2020    INR 1.4 05/19/2020    PROTIME 41.1 (H) 05/24/2020    PROTIME 17.1 (H) 05/20/2020    PROTIME 17.2 (H) 05/19/2020       Radiology    CXR 5/24      CT chest    No CT evidence of acute pulmonary embolism. 2. Interval progression of the patient's known large  left lung mass with   only residual minimal aeration of both the left upper lower lobes when   compared to the prior CT study. 3. Masslike metastatic lymphadenopathy involving the mediastinal left hilar   regions, grossly similar to the prior study.    4. Moderate left-sided

## 2020-05-25 PROBLEM — E87.20 LACTIC ACIDOSIS: Status: ACTIVE | Noted: 2020-01-01

## 2020-05-25 NOTE — PLAN OF CARE
Problem: Falls - Risk of:  Goal: Will remain free from falls  Description: Will remain free from falls  Outcome: Ongoing  Goal: Absence of physical injury  Description: Absence of physical injury  Outcome: Ongoing     Problem: Breathing Pattern - Ineffective  Goal: Effective breathing pattern  Outcome: Ongoing     Problem: Nutrition  Goal: Optimal nutrition therapy  Outcome: Ongoing

## 2020-05-25 NOTE — PROGRESS NOTES
mass.      Patient is awaiting chemotherapy. He had port placed prior to the hospitalization. 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     - Holding anticoagulation  - To undergo IR guided thora on Tuesday. - KRISTINA has resolved  - Long discussion about holding AC in light of DVT vs continuing with hemoptysis    Review of Systems:     Constitutional:  negative for chills, fevers, sweats  Respiratory: positive for shortness of breath, coughing, and hemoptysis. negative for wheezing  Cardiovascular:  negative for chest pain, chest pressure/discomfort, lower extremity edema, palpitations  Gastrointestinal:  negative for abdominal pain, constipation, diarrhea, nausea, vomiting  MSK: reports left upper arm swelling  Neurological:  negative for dizziness, headache    Medications:      Allergies:  No Known Allergies    Current Meds:   Scheduled Meds:    ipratropium-albuterol  1 ampule Inhalation Q4H WA    guaiFENesin  600 mg Oral BID    piperacillin-tazobactam  3.375 g Intravenous Q8H    aspirin  81 mg Oral Daily    atorvastatin  40 mg Oral Nightly    budesonide-formoterol  2 puff Inhalation BID    dexamethasone  4 mg Oral Q8H    dilTIAZem  240 mg Oral Daily    [Held by provider] dofetilide  500 mcg Oral 2 times per day    metoprolol succinate  100 mg Oral Daily    pantoprazole  40 mg Oral QAM AC    sodium chloride flush  10 mL Intravenous 2 times per day     Continuous Infusions:    sodium chloride 75 mL/hr at 05/24/20 0940     PRN Meds: benzonatate, sodium chloride flush, potassium chloride **OR** potassium alternative oral replacement **OR** potassium chloride, magnesium sulfate, acetaminophen **OR** acetaminophen, polyethylene glycol, promethazine **OR** ondansetron, nicotine    Data:     Past Medical History:   has a past medical history of Atrial fibrillation with RVR ([facility] Cancer ([facility] COPD (chronic obstructive pulmonary disease) ([facility] Hx of blood clots,

## 2020-05-25 NOTE — CONSULTS
Patient Name: Henri Stratton  Date of admission: 5/23/2020  7:34 AM  Patient's age: 77 y.o., 1945  Admission Dx: KRISTINA (acute kidney injury) ([facility] [N17.9]    Reason for Consult: management recommendations  Requesting Physician: Mikayla Escalera MD    CHIEF COMPLAINT:  Lung cancer    History Obtained From:  patient    HISTORY OF PRESENT ILLNESS:      The patient is a 77 y.o.  male who is admitted to the hospital for acute respiratory failure. And progressive hemoptysis  Pt is known to us with small cell lung cancer, stage 4. We plan to start chemo int he next few days   He has afib on anticoagulation. Pt is seen and examined, + hemoptysis. INR elevated, likely due to DOAC. 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 Past Surgical History:   has a past surgical history that includes Tunneled venous port placement (05/19/2020). Medications:    Reviewed in Epic     Allergies:  Patient has no known allergies. Social History:   reports that he quit smoking about 3 weeks ago. His smoking use included cigars and cigarettes. He has a 50.00 pack-year smoking history. He has never used smokeless tobacco. He reports previous alcohol use. He reports that he does not use drugs. Family History: family history includes Heart Disease in his father and mother; Hemochromatosis in his sister. REVIEW OF SYSTEMS:    Constitutional: No fever or chills.  No night sweats, +weight loss and fatigue   Eyes: No eye discharge, double vision, or eye pain   HEENT: negative for sore mouth, sore throat, hoarseness and voice change   Respiratory: dyspnea,

## 2020-05-25 NOTE — FLOWSHEET NOTE
05/25/20 0606   Provider Notification   Reason for Communication Review case;New orders   Provider Name Dr. Jaimee Douglas   Provider Notification Physician   Method of Communication Call   Response See orders   Notification Time 0606     RN spoke with the physician regarding the pt's increased WOB at rest.  Physician stated to place the pt on BIPAP (16/8) at this time and order a breathing treatment protocol if needed. Orders placed. See orders. Will continue to monitor.

## 2020-05-25 NOTE — PROGRESS NOTES
Occupational Therapy   Occupational Therapy Initial Assessment  Date: 2020   Patient Name: Tita Rutledge  MRN: [id number]     : 1945    RN Aaron Johnson reports patient is medically stable for therapy treatment this date. Chart reviewed prior to treatment and patient is agreeable for therapy. All lines intact and patient positioned comfortably at end of treatment. All patient needs addressed prior to ending therapy session. Date of Service: 2020    Discharge Recommendations:  Continue to assess pending progress, Home with assist PRN, Home with Home health OT  OT Equipment Recommendations  Equipment Needed: Yes  Mobility Devices: ADL Assistive Devices  ADL Assistive Devices: Reacher;Long-handled [place];Long-handled Sponge;Emergency Alert System;Grab Bars - shower    Assessment   Performance deficits / Impairments: Decreased functional mobility ; Decreased endurance;Decreased safe awareness;Decreased ADL status; Decreased balance;Decreased high-level IADLs;Decreased posture;Decreased coordination  Assessment: Skilled OT is indicated to increase I and safety awareness with functional performance to return home with assist as needed. Prognosis: Fair  Decision Making: Medium Complexity  OT Education: OT Role;Plan of Care;Transfer Training;Energy Conservation  Patient Education: call light use/fall prevention, pursed lip breathing, safety in function, recommendations for continued therapy services   REQUIRES OT FOLLOW UP: Yes  Activity Tolerance  Activity Tolerance: Patient limited by fatigue(limited by dizziness )  Activity Tolerance: poor plus/fair minus   Safety Devices  Safety Devices in place: Yes  Type of devices: Call light within reach; Left in chair;Patient at risk for falls;Gait belt;Nurse notified           Patient Diagnosis(es): The primary encounter diagnosis was Malignant neoplasm of left lung, unspecified part of lung ([facility]  Diagnoses of KRISTINA (acute kidney injury) ([facility] and Hypoxia were also per NC, up with assist, RUE IV     Subjective   General  Chart Reviewed: Yes  Patient assessed for rehabilitation services?: Yes  Family / Caregiver Present: No  *pt denies pain     Social/Functional History  Social/Functional History  Lives With: Spouse  Type of Home: House  Home Layout: Two level, Bed/Bath upstairs(FF steps with L HR )  Home Access: Stairs to enter with rails  Entrance Stairs - Number of Steps: 3  Entrance Stairs - Rails: Left  Bathroom Shower/Tub: Tub/Shower unit  Bathroom Toilet: Handicap height  Bathroom Equipment: Shower chair  Home Equipment: Rolling walker, [employer], [place] cane, [employer]  Receives Help From: Family-pt states his spouse is supportive   ADL Assistance: Independent  Homemaking Assistance: Needs assistance(spouse assists with IADLS as needed )  Homemaking Responsibilities: Yes  Ambulation Assistance: Independent  Transfer Assistance: Independent  Active : Yes  Occupation: Retired  Type of occupation: [address] [place] [address] Pkwy: [employer]   Additional Comments: Pt denies recent falls. Objective   Vision: Impaired(pt states he has had B cataract sx; c/o intermittent floaters )  Vision Exceptions: Wears glasses for reading  Hearing: Within functional limits    Orientation  Overall Orientation Status: Within Functional Limits  Observation/Palpation  Posture: Fair(forward flexed posture)  Observation: BUE bruises,/fragile skin, R sided chest port. Pt with c/o dizziness after sup to sit and with standing and BP taken and read at 143/74 and MAP 91/RN was notified.     Edema: LUE edema(h/o LUE DVT) and BLE edema  Balance  Sitting Balance: Contact guard assistance  Standing Balance: Minimal assistance(x2 for safety/balance due to dizziness )  Standing Balance  Time: stand nash < 30 seconds due to dizziness   Functional Mobility  Functional - Mobility Device: Rolling Walker  Assist Level: Minimal assistance(x2 for safety/balance )  Functional Mobility Sensation Status: WFL        LUE AROM (degrees)  LUE AROM : WFL  RUE AROM (degrees)  RUE AROM : WFL  LUE Strength  Gross LUE Strength: WFL  LUE Strength Comment: BUE strength grossly 4 plus/5   RUE Strength  Gross RUE Strength: WFL                   Plan   Plan  Times per week: 4-5x/week 1x/day as nash   Current Treatment Recommendations: Strengthening, Balance Training, Neuromuscular Re-education, Safety Education & Training, Self-Care / ADL, Equipment Evaluation, Education, & procurement, Endurance Training, Functional Mobility Training, Patient/Caregiver Education & Training, Home Management Training                                                      AM-PAC Score   16    *Co-treatment with PT warranted secondary to decreased safety and independence requiring 2 skilled therapy professionals to address individual discipline's goals. OT addressing preparation for ADL transfer, sitting and standing balance for increased ADL performance, sitting/activity tolerance, functional reaching, environmental safety/scanning, fall prevention, functional mobility for ADL transfers, ability to sequence and follow directions and functional UE strength. Goals  Short term goals  Time Frame for Short term goals: by discharge, pt to demo   Short term goal 1: bed mob tasks with use of rail as needed to MOD I. Short term goal 2: I with BUE HEP with use of hand outs as needed to maintain strength. Short term goal 3: UB ADL to set up and LB ADL to SBA with use of AD/AE as needed. Short term goal 4: toileting tasks with use of BSC/AD and grab bar as needed to SBA. Short term goal 5: ADL transfers and functional mob with AD as needed to SBA. Long term goals  Long term goal 1: Pt to stand with SUP and AD nash > 5 mins to reduce falls. Long term goal 2: Pt to be with EC/WS and fall prevention tech as well as AE/DME recommendations with use of hand outs as needed. Patient Goals   Patient goals : get home soon!

## 2020-05-25 NOTE — PROGRESS NOTES
atelectasis most likely malignant  · Hemoptysis on anticoagulation suspect endobronchial lesion  · Possible postobstructive pneumonia  · Left upper arm DVT/left lower extremity edema  · Probable obstructive sleep apnea/Obesity  · Smoker recently quit  · Acute renal insufficiency  · Increased INR on Eliquis    Recommendations:  · Oxygen by nasal cannula  · BiPAP support  · Incentive spirometry every hour while awake  · DuoNeb by nebulizer  · Symbicort  · Decadron 4 p.o. every 8 hours  · Zosyn check procalcitonin  · Radiation oncology evaluation  · Left thoracentesis by IR; will see if it affects lung expansion/dyspnea  · Hold Eliquis  · Monitor hemoptysis and hemoglobin  · Check lower extremity venous Dopplers  · Chest x-ray  · IV fluids  · Nephrology consult  · Cardiac meds restarted per primary  · Oncology consult  · poor prognosis  · Consider palliative care consult    Trace Bai MD, [facility]  Pulmonary Critical Care and Sleep Medicine,  [facility]  Cell: [phone]  Office: [phone]

## 2020-05-25 NOTE — PROGRESS NOTES
Patient Name: Tita Rutledge  Date of admission: 5/23/2020  7:34 AM  Patient's age: 77 y.o., 1945  Admission Dx: KRISTINA (acute kidney injury) ([facility] [N17.9]    Reason for Consult: management recommendations  Requesting Physician: Jessica Marks MD    CHIEF COMPLAINT:  Lung cancer    INTERIM HISTORY  Since he seen and evaluated. Continues to have significant hemoptysis. Off anticoagulation  Plan for thoracocentesis tomorrow    HISTORY OF PRESENT ILLNESS:      The patient is a 77 y.o.  male who is admitted to the hospital for acute respiratory failure. And progressive hemoptysis  Pt is known to us with small cell lung cancer, stage 4. We plan to start chemo int he next few days   He has afib on anticoagulation. Pt is seen and examined, + hemoptysis. INR elevated, likely due to DOAC. 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 Past Surgical History:   has a past surgical history that includes Tunneled venous port placement (05/19/2020). Medications:    Reviewed in Epic     Allergies:  Patient has no known allergies. Social History:   reports that he quit smoking about 3 weeks ago. His smoking use included cigars and cigarettes. He has a 50.00 pack-year smoking history. He has never used smokeless tobacco. He reports previous alcohol use. He reports that he does not use drugs. Family History: family history includes Heart Disease in his father and mother; Hemochromatosis in his sister. REVIEW OF SYSTEMS:    Constitutional: No fever or chills.  No night sweats, +weight loss and fatigue   Eyes: No eye discharge, double vision, or eye pain   HEENT: negative for sore mouth, sore throat, hoarseness and voice change   Respiratory: dyspnea, chest discomfort and hemoptysis   Cardiovascular: negative for chest pain, dyspnea, palpitations, orthopnea, PND   Gastrointestinal: negative for nausea, vomiting, diarrhea, constipation, abdominal pain, Dysphagia, hematemesis and hematochezia   Genitourinary: negative for frequency, dysuria, nocturia, urinary incontinence, and hematuria   Integument: negative for rash, skin lesions, bruises.    Hematologic/Lymphatic: negative for easy bruising, bleeding, lymphadenopathy, or petechiae   Endocrine: negative for heat or cold intolerance,weight changes, change in bowel habits and hair loss   Musculoskeletal: negative for myalgias, arthralgias, pain, joint swelling,and bone pain   Neurological: negative for headaches, dizziness, seizures, weakness, numbness    PHYSICAL EXAM:      BP (!) 97/52   Pulse 77   Temp 96.6 °F (35.9 °C) (Temporal)   Resp 16   Ht 6' 2\" (1.88 m)   Wt 287 lb 14.4 oz (130.6 kg)   SpO2 98%   BMI 36.96 kg/m²    Temp (24hrs), Av.9 °F (36.1 °C), Min:96.5 °F (35.8 °C), Max:97.3 °F (36.3 °C)    General appearance - ill appearing elderly man, + hemoptysis   Mental status - alert and cooperative   Eyes - pupils equal and reactive, extraocular eye movements intact   Ears - bilateral TM's and external ear canals normal   Mouth - mucous membranes moist, pharynx normal without lesions   Neck - supple, no significant adenopathy   Lymphatics - no palpable lymphadenopathy, no hepatosplenomegaly   Chest - poor air entry , no wheezing   Heart - irregular, afib   Abdomen - soft, nontender, nondistended, no masses or organomegaly   Neurological - alert, oriented, normal speech, no focal findings or movement disorder noted   Musculoskeletal - no joint tenderness, deformity or swelling   Extremities - peripheral pulses normal, no pedal edema, no clubbing or cyanosis , arm swelling,   Skin - normal coloration and turgor, no rashes, no suspicious skin lesions noted ,    DATA:    Labs:   CBC:   Recent Labs     05/24/20 0310 05/25/20 0311   WBC 14.1* 13.5*   HGB 9.5* 9.3*   HCT 31.2* 29.9*    227     BMP:   Recent Labs     05/24/20 0310 05/25/20 0311    136   K 4.8 4.9   CO2 23 25   BUN 57* 54*   CREATININE 1.38* 0.91   LABGLOM 50* >60   GLUCOSE 166* 169*     PT/INR:   Recent Labs     05/24/20 0310 05/25/20 0311   PROTIME 41.1* 26.1*   INR 4.3 2.4       IMAGING DATA:      Primary Problem  KRISTINA (acute kidney injury) [facility])    Active Hospital Problems    Diagnosis Date Noted    Supratherapeutic INR [R79.1] 05/24/2020     Priority: High    Pleural effusion on left [J90] 05/24/2020     Priority: High    Obesity (BMI 30-39. 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 Acute resp failure  2. Metastatic small cell lung cancer  3. hemoptysis due to tumor  4. afib   5. Elevated INR due to DOAC   6. KRISTINA    RECOMMENDATIONS:  1. Hold elquis, it will be difficult to offer him anticoagulation considering his significant hemoptysis  2. The plan is for thoracocentesis tomorrow  3. We will need to start chemotherapy fairly soon as the patient symptoms are worsening and he is declining  4.  Likely to tune him up and to improve his performance status this week and start

## 2020-05-25 NOTE — PROGRESS NOTES
Q8H    dilTIAZem  240 mg Oral Daily    [Held by provider] dofetilide  500 mcg Oral 2 times per day    metoprolol succinate  100 mg Oral Daily    pantoprazole  40 mg Oral QAM AC    sodium chloride flush  10 mL Intravenous 2 times per day       IV Infusions (if any):   sodium chloride 75 mL/hr at 05/24/20 0940       Diagnostics:   Telemetry:PAROXYSMAL ATRIAL FIBRILLATION. Labs:   CBC:  Recent Labs     05/24/20  0310 05/25/20  0311   WBC 14.1* 13.5*   HGB 9.5* 9.3*   HCT 31.2* 29.9*    227     Magnesium:  Recent Labs     05/23/20  0818   MG 2.3     BMP:  Recent Labs     05/24/20  0310 05/25/20  0311    136   K 4.8 4.9   CALCIUM 9.1 9.3   CO2 23 25   BUN 57* 54*   CREATININE 1.38* 0.91   LABGLOM 50* >60   GLUCOSE 166* 169*     BNP:No results for input(s): BNP, PROBNP in the last 72 hours. PT/INR:  Recent Labs     05/24/20  0310 05/25/20  0311   PROTIME 41.1* 26.1*   INR 4.3 2.4     APTT:No results for input(s): APTT in the last 72 hours. CARDIAC ENZYMES:  Recent Labs     05/23/20  0818 05/23/20  1021 05/24/20  0310   CKTOTAL  --   --  167   TROPHS 19 19  --    TROPONINT NOT REPORTED NOT REPORTED  --      FASTING LIPID PANEL:  Lab Results   Component Value Date    HDL 36 05/05/2020    TRIG 108 05/05/2020     LIVER PROFILE:No results for input(s): AST, ALT, LABALBU, ALKPHOS, BILITOT, BILIDIR, IBILI, PROT, GLOB, ALBUMIN in the last 72 hours. ASSESSMENT:  1. PAROXYSMAL ATRIAL FIBRILLATION. 2. HEMOPTYSIS. 3. LEFT UPPER EXTREMITY DVT. 4. LUNG CANCER.     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 9)    Pleural effusion on left       PLAN:  1. IN VIEW OF HEMOPTYSIS, THERE IS NO CHOICE BUT TO STOP ANTICOAGULATION WITH ELIQUIS. 2. CONTINUE DOFETILIDE, AND METOPROLOL FOR ATRIAL FIBRILLATION. 3. PROGNOSIS IS POOR DUE TO STAGE IV LUNG CANCER. Discussed with patient and nursing.     Todd Ellington MD

## 2020-05-25 NOTE — PROGRESS NOTES
position/activity restrictions: 3 L O2 per NC, up with assist, RUE IV   Vision/Hearing        Subjective  General  Chart Reviewed: Yes  Patient assessed for rehabilitation services?: Yes  Additional Pertinent Hx: Recent lung CA dx, chronic DVT LUE,   Response To Previous Treatment: Not applicable  Family / Caregiver Present: No  Diagnosis: KRISTINA, hypoxia, Lung CA  Follows Commands: Within Functional Limits  General Comment  Comments: RNGhazal reports patient appropriate for PT, but to monitor SP02 as 02 just increased from 2 liters to 3 liters. Subjective  Subjective: Patient pleasant and agreeable to PT. Pain Screening  Patient Currently in Pain: Denies     Pre Treatment Pain Screening  Intervention List: Patient able to continue with treatment    Orientation  Orientation  Overall Orientation Status: Within Normal Limits  Social/Functional History  Social/Functional History  Lives With: Spouse  Type of Home: House  Home Layout: Two level, Bed/Bath upstairs(FF steps with L HR )  Home Access: Stairs to enter with rails  Entrance Stairs - Number of Steps: 3  Entrance Stairs - Rails: Left  Bathroom Shower/Tub: Tub/Shower unit  Bathroom Toilet: Handicap height  Bathroom Equipment: Shower chair  Home Equipment: Rolling walker, Cane, Quad cane, [address] Help From: Family  ADL Assistance: Independent  Homemaking Assistance: Needs assistance(spouse assists with IADLS as needed )  Homemaking Responsibilities: Yes  Ambulation Assistance: Independent  Transfer Assistance: Independent  Active : Yes  Occupation: Retired  Type of occupation: [address]: [employer]   Additional Comments: Pt denies recent falls. Cognition   Cognition  Overall Cognitive Status: WNL    Objective     Observation/Palpation  Posture: Fair(forward flexed posture)  Observation: BUE bruises,/fragile skin, R sided chest port.     Edema: LUE edema(h/o LUE DVT) and BLE edema    AROM RLE (degrees)  RLE Mobility Raw Score : 15 (05/25/20 1208)  AM-PAC Inpatient T-Scale Score : 39.45 (05/25/20 1208)  Mobility Inpatient CMS 0-100% Score: 57.7 (05/25/20 1208)  Mobility Inpatient CMS G-Code Modifier : CK (05/25/20 1208)          Goals  Short term goals  Time Frame for Short term goals: 12 visits  Short term goal 1: Patient will be indep with bed mobility and transfers. Short term goal 2: Patient will amb 200 feet with RW and indep. Short term goal 3: Patient will have fair+ standing balance. Short term goal 4: Patient will be indep with HEP. Short term goal 5: Patient will negotiate 3 steps with rails and supervision. Patient Goals   Patient goals : Improve dizziness     Co-treatment with OT warranted secondary to decreased safety and independence requiring 2 skilled therapy professionals to address individual discipline's goals. PT addressing pre gait trunk strengthening, weight shifting prior to transfers and transfer training. Cotx for transfers /  amb due to dizziness and safety.   Therapy Time   Individual Concurrent Group Co-treatment   Time In 3137         Time Out 0837         Minutes 42         Timed Code Treatment Minutes: [address] [facility] [address], PT

## 2020-05-26 PROBLEM — I82.622 ACUTE DEEP VEIN THROMBOSIS (DVT) OF LEFT UPPER EXTREMITY (HCC): Status: ACTIVE | Noted: 2020-01-01

## 2020-05-26 NOTE — PROGRESS NOTES
Patient tolerated left thoracentesis without distress. 1550ml of red fluid removed from pleural space. Dry dressing to site.  Patient returned to room 2042

## 2020-05-26 NOTE — PROGRESS NOTES
Occupational Therapy  DATE: 2020    NAME: Alexis Gaspar  MRN: [id number]   : 1945    Patient not seen this date for Occupational Therapy due to:  [] Blood transfusion in progress  [] Cancel by RN  [] Hemodialysis  []  Refusal by Patient   [] Spine Precautions   [] Strict Bedrest  [] Surgery  [] Testing      [x] Other- pt declined therapy (nursing okayed for therapy in bed) and states he has thoracentesis this date and note doppler ordered to r/o LLE DVT as well. [] PT being discontinued at this time. Patient independent. No further needs. [] PT being discontinued at this time as the patient has been transferred to hospice care. No further needs.     Valeta Bosworth, OT

## 2020-05-26 NOTE — PROGRESS NOTES
bedside  Mental Status:  oriented to person, place and time and normal affect  Lungs:  Diminished lung sounds in left upper and lower lung fields; + egophony in left lower lung field, no wheezes or ronchi present  Heart:  regular rate and rhythm, no murmur  Chest wall: right-sided port present  Abdomen:  soft, nontender, nondistended, normal bowel sounds, no masses, hepatomegaly, splenomegaly  Extremities:  no redness or tenderness in the calves; left lower extremity > right lower extremity; left upper extremity > right upper extremity; no pitting edema noted  Skin:  Multiple ecchymoses scattered throughout upper arms and on right anterior chest wall    Assessment:        Hospital Problems           Last Modified POA    * (Principal) Acute respiratory failure with hypoxia ([address] 5/26/2020 Yes    PAF (paroxysmal atrial fibrillation) ([address] 5/24/2020 Yes    Small cell lung cancer ([address] 5/24/2020 Yes    Pneumonia of left lower lobe due to infectious organism ([address] 5/24/2020 Yes    KRISTINA (acute kidney injury) ([address] 5/26/2020 Yes    Supratherapeutic INR 5/24/2020 Yes    Pleural effusion on left 5/24/2020 Yes    Lactic acidosis 5/25/2020 Yes    Acute deep vein thrombosis (DVT) of left upper extremity ([address] 5/26/2020 Yes    Essential hypertension 5/24/2020 Yes    IGT (impaired glucose tolerance) (Chronic) 5/24/2020 Yes    Tobacco abuse (Chronic) 5/24/2020 Yes    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 9) 5/24/2020 Yes          Plan:        1. Appreciate oncology and pulmonology recommendations  2. Continue to hold Eliquis moving forward  3. Will follow up with oncology either later this week or early next week for 3 days of chemo - either Wed/Thurs/Fri or next Mon/Tues/Wed. 4. Appreciate pulmonology recs - left thoracentesis with IR, pulmonary updrafts  5.  Needs COVID testing (even

## 2020-05-26 NOTE — PROGRESS NOTES
[facility] Cardiology  Attending Progress Note    Date:  5/26/2020  Patient: Jared Chawla  [id number]  Age:  77 y.o. Admission:  5/23/2020  7:34 AM  Length of stay  LOS: 3 days   Admit DX: K[facility]STINA (acute kidney [facility]) [facility] [N17.9]      Assessment:  1. Paroxysmal atrial fibrillation  2. Left upper extremity deep vein thrombosis  3. Hemoptysis, on anticoagulation. 4. Metastatic small cell lung cancer  5. Left lung atelectasis secondary to extensive tumor involvement of the airways  6. Possible postobstructive pneumonia  7. Recently quit smoking    Plan:  He is planned for a thoracentesis today. He remains off anticoagulation which carries a risk of thromboembolic disease, however given his continued hemoptysis that does not leave much of a choice in the matter. Limited options to offer from a cardiac standpoint at this stage. We will continue to follow with you.    ==============================================================    Chief Complaint: Shortness of breath, seen for atrial fibrillation    Subjective: The patient was seen and examined. Notes and labs reviewed. There were no complications over night. Patient's cardiac review of systems: Remains short of breath but denies chest pain. The patient is generally feeling unchanged. Objective:   BP (!) 154/71   Pulse 68   Temp 97.2 °F (36.2 °C) (Temporal)   Resp 18   Ht 6' 2\" (1.88 m)   Wt 287 lb 3.2 oz (130.3 kg)   SpO2 90%   BMI 36.87 kg/m²     Intake/Output Summary (Last 24 hours) at 5/26/2020 1111  Last data filed at 5/26/2020 0849  Gross per 24 hour   Intake 620 ml   Output 1300 ml   Net -680 ml       CONSTITUTIONAL:  awake, alert, cooperative, no apparent distress  HEAD:  Head is atraumatic, normocephalic. Eyes and oral mucosa are normal.  LUNGS:  Absent left sided breath sounds  CARDIOVASCULAR: Regular rhythm, normal S1 and S2, and no murmur or rub noted. ABDOMEN:  Soft, nontender, nondistended.    EXTREMITIES: No lower extremity

## 2020-05-26 NOTE — PROGRESS NOTES
Pulmonary Critical Care Progress Note    Patient seen for the follow up of Acute respiratory failure with hypoxia ([address]     Subjective:    He had thoracentesis with 1.5 L of bloody fluid removed. I was informed ordered a repeat chest x-ray and hemoglobin for concern about a complication. Loren Fallow He feels less short of breath. He has been on oxygen at 2 L nasal cannula. He had tolerated BiPAP. He has persistent hemoptysis. He st denies chest pain. He has poor appetite. Examination:    Vitals: BP (!) 142/60   Pulse 117   Temp 97.2 °F (36.2 °C) (Temporal)   Resp 20   Ht 6' 2\" (1.88 m)   Wt 287 lb 3.2 oz (130.3 kg)   SpO2 94%   BMI 36.87 kg/m²   SpO2  Av.6 %  Min: 90 %  Max: 99 %  General appearance: alert and cooperative with exam  Neck: No JVD enlarged left supraclavicular lymph node  Lungs: Decreased breath sounds on the left side no crackles  Heart: regular rate and rhythm, S1, S2 normal, no gallop  Abdomen: Soft, non tender, + BS  Extremities: no cyanosis or clubbing. significant edema worse left lower and left upper extremity    LABs:    CBC:   Recent Labs     201 20  1436   WBC 13.5* 14.8*  --    HGB 9.3* 9.2* 9.2*   HCT 29.9* 30.4* 29.7*    241  --      BMP:   Recent Labs     20  0451    135   K 4.9 5.2   CO2 25 24   BUN 54* 60*   CREATININE 0.91 0.98   LABGLOM >60 >60   GLUCOSE 169* 195*     PT/INR:   Recent Labs     20  0812   PROTIME 26.1* 19.5*   INR 2.4 1.7       Radiology:  Chest x-ray 526 complete opacification left hemithorax     chest x-ray      CT chest  1. No CT evidence of acute pulmonary embolism. 2. Interval progression of the patient's known large  left lung mass with   only residual minimal aeration of both the left upper lower lobes when   compared to the prior CT study.    3. Masslike metastatic lymphadenopathy involving the mediastinal left hilar   regions, grossly similar to the prior study.   4. Moderate left-sided pleural effusion       Impression:  · Acute respiratory insufficiency  · Acute Exacerbation of COPD  · advanced lung cancer with supraclavicular lymphadenopathy and brain metastasis on Decadron  · Hemoptysis on anticoagulation with left lung atelectasis secondary to extensive tumor involvement of the airway with airway stenosis suspect endobronchial involvement  · Hemorrhagic left pleural effusion with significant atelectasis most likely malignant  · Possible postobstructive pneumonia  · Left upper arm DVT/left lower extremity edema  · Probable obstructive sleep apnea/Obesity  · Smoker recently quit  · Acute renal insufficiency  · Increased INR on Eliquis    Recommendations:  · Oxygen by nasal cannula  · BiPAP support  · Incentive spirometry every hour while awake  · DuoNeb by nebulizer  · Symbicort  · Decadron 4 p.o. every 8 hours  · Zosyn   · Radiation oncology evaluation  · Pleurx catheter would not likely to be helpful given no expansion after thoracentesis  · Follow-up chest x-ray  · Hold Eliquis  · Monitor hemoptysis and hemoglobin  · Check lower extremity venous Dopplers  · IV fluids  · Nephrology consult  · Oncology consult/plan for chemotherapy  · poor prognosis  · Discussed with RN  · Physical therapy/ambulate  · Consider palliative care consult    Marc Montaño MD, [facility]  Pulmonary Critical Care and Sleep Medicine,  [facility]  Cell: [phone]  Office: [phone]

## 2020-05-26 NOTE — PROGRESS NOTES
Covid 19 swab taken from left nare by Cherry CROW, labeled, placed in red dot bag, and handed off to second healthcare worker outside of room for transport to laboratory per hospital policy and procedure. Patient tolerated procedure well.

## 2020-05-26 NOTE — FLOWSHEET NOTE
RN notified Dr. Blanca Mariano via perfect serve message that IR called and states that patient will need orders for coags and covid testing prior to thoracentesis. Awaiting response at this time.

## 2020-05-26 NOTE — BRIEF OP NOTE
Brief Postoperative Note for Thoracentesis    Milus Sole  YOB: 1945  [id number]    Pre-operative Diagnosis: left Pleural effusion      Post-operative Diagnosis: Same    Procedure: Ultrasound guided Thoracentesis    Anesthesia: 1% Lidocaine     Surgeons/Assistants: KELLY JUAN    Complications: None    Specimens: were obtained    Ultrasound guided leftthoracentesis performed. 65 old blood appearing fluid obtained. Dressing applied. Chest x-ray ordered.         Electronically signed by Franc Hicks on 5/26/2020 at 1:23 PM

## 2020-05-27 PROBLEM — I82.432 ACUTE DEEP VEIN THROMBOSIS (DVT) OF POPLITEAL VEIN OF LEFT LOWER EXTREMITY (HCC): Status: ACTIVE | Noted: 2020-01-01

## 2020-05-27 NOTE — TELEPHONE ENCOUNTER
PER DR ISABEL LAGUERRE WILL BE DISCHARGED IF WE CAN FIT HIM ON THE SCHEDULE NEXT WEEK FOR TX AND MD VISIT  PER RICHIE AGUILAR 6/2/20 @ 11AM W/ MD VISIT @ 11:15 AM DR Swetha Tran  6/3 @ 11AM & 6/4 @ 10  [facility] NOTIFIED OF ABOVE APPOINTMENTS

## 2020-05-27 NOTE — PROGRESS NOTES
[facility]    Progress Note    5/27/2020    9:46 AM    Name:   Alise Watson  MRN:     [id number]     Acct:      [de-identified]   Room:   2042/2042-01   Day:  4  Admit Date:  5/23/2020  7:34 AM    PCP:   Nikunj Lyons MD  Code Status:  Full Code    Subjective:     C/C:   Chief Complaint   Patient presents with    Shortness of Breath     RECENT DIAGNOSIS lUNG CANCER     Interval History Status: not changed. Patient seen and examined this morning. Sitting up on bedside commode this morning. Chest x-ray with persistent left-sided whiteout. Patient reports that his shortness of breath is about the same, despite having 1500 cc of old blood removed via thoracentesis yesterday. He becomes very short of breath with any sort of ambulation or activity. Lactic acid remains elevated at around 5.5. Brief History:     The patient is a 77 y.o. Non-/non  male who presents with Shortness of Breath (RECENT DIAGNOSIS LUNG CANCER) and he is admitted to the hospital for the management of  KRISTINA (acute kidney injury). Patient was brought to emergency room by ambulance after he had hemoptysis. Patient also complained of difficulty in breathing. He otherwise had no changes. He was recently admitted in the hospital for A. fib RVR, pneumonia. Patient was discharged on Levaquin, [address]. Patient also was having worsening left upper extremity swelling and was found to have left subclavian DVT. Anticoagulation was changed to high-dose Eliquis for 7 days,  breathing improved with treatment and he was evaluated for home O2 and not qualified at that time. Patient was discharged home yesterday on 5/22/2020 and return back today. Initial evaluation emergency room showed lactic acidosis 4.3, BUN 44, creatinine 1.34. Patient had normal kidney function at discharge. He also had leukocytosis 16.7. Patient is currently on dexamethasone.   CT chest was repeated and showed interval progression of lung mass.      Patient is awaiting chemotherapy. He had port placed prior to the hospitalization. 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     - Holding anticoagulation   - To undergo IR guided thora on Tuesday. - KRISTINA has resolved  - Long discussion about holding AC in light of DVT vs continuing with hemoptysis    Review of Systems:     Constitutional:  negative for chills, fevers, sweats  Respiratory: positive for shortness of breath, which persists and is worse with movement, positive for coughing and hemoptysis. negative for wheezing  Cardiovascular:  negative for chest pain, chest pressure/discomfort, lower extremity edema, palpitations  Gastrointestinal:  negative for abdominal pain, constipation, diarrhea, nausea, vomiting  MSK: reports left upper arm swelling  Neurological:  negative for dizziness, headache    Medications:      Allergies:  No Known Allergies    Current Meds:   Scheduled Meds:    ipratropium-albuterol  1 ampule Inhalation Q4H WA    guaiFENesin  600 mg Oral BID    piperacillin-tazobactam  3.375 g Intravenous Q8H    aspirin  81 mg Oral Daily    atorvastatin  40 mg Oral Nightly    budesonide-formoterol  2 puff Inhalation BID    dexamethasone  4 mg Oral Q8H    dilTIAZem  240 mg Oral Daily    [Held by provider] dofetilide  500 mcg Oral 2 times per day    metoprolol succinate  100 mg Oral Daily    pantoprazole  40 mg Oral QAM AC    sodium chloride flush  10 mL Intravenous 2 times per day     Continuous Infusions:    sodium chloride 75 mL/hr at 05/24/20 0940     PRN Meds: acetaminophen, benzonatate, sodium chloride flush, potassium chloride **OR** potassium alternative oral replacement **OR** potassium chloride, magnesium sulfate, polyethylene glycol, promethazine **OR** ondansetron, nicotine    Data:     Past Medical History:   has a past medical history of Atrial fibrillation with RVR ([address] CALCIUM 9.3 9.5 9.5     No results for input(s): PROT, LABALBU, LABA1C, Q0ISMSZ, U0GQQAI, FT4, TSH, AST, ALT, LDH, GGT, ALKPHOS, LABGGT, BILITOT, BILIDIR, AMMONIA, AMYLASE, LIPASE, LACTATE, CHOL, HDL, LDLCHOLESTEROL, CHOLHDLRATIO, TRIG, VLDL, LBR78UP, PHENYTOIN, PHENYF, URICACID, POCGLU in the last 72 hours. ABG:No results found for: POCPH, PHART, PH, POCPCO2, SJE4THK, PCO2, POCPO2, PO2ART, PO2, POCHCO3, SQJ1VDR, HCO3, NBEA, PBEA, BEART, BE, THGBART, THB, UYD3HHB, DQHQ0FKC, P6VOSWTM, O2SAT, FIO2  Lab Results   Component Value Date/Time    SPECIAL NOT REPORTED 05/26/2020 12:40 PM     Lab Results   Component Value Date/Time    CULTURE NO GROWTH 8 HOURS 05/26/2020 12:40 PM       Radiology:  Xr Chest Portable    Result Date: 5/23/2020  Overall, is been no significant change in chest findings compared to the May 21, 2020 study. Near complete whiteout of the left hemithorax is seen likely related to obstruction/lung atelectasis by the patient's known lung cancer. Xr Chest Portable    Result Date: 5/21/2020  No significant change in the near complete opacification of the left hemithorax reflecting mass associated with effusion. Xr Chest Portable    Result Date: 5/19/2020  Progressive opacification of the left hemithorax. Port catheter tip in the SVC with no pneumothorax. Ct Chest Pulmonary Embolism W Contrast    Result Date: 5/23/2020  1. No CT evidence of acute pulmonary embolism. 2. Interval progression of the patient's known large  left lung mass with only residual minimal aeration of both the left upper lower lobes when compared to the prior CT study. 3. Masslike metastatic lymphadenopathy involving the mediastinal left hilar regions, grossly similar to the prior study. 4. Moderate left-sided pleural effusion. Ir Port Placement > 5 Years    Result Date: 5/19/2020  Successful ultrasound and fluoroscopy guided right internal jugular vein 8 Syriac power injectable Port-A-Cath placement. Ready for use. Mri Brain W Wo Contrast    Result Date: 5/21/2020  Rounded focus of enhancement and restricted diffusion in the left frontal lobe concerning for metastatic disease. There is a smaller punctate enhancing focus medial to this in the left frontal lobe as well. Physical Examination:        General appearance:  alert, cooperative and no distress, sitting up in chair at bedside  Mental Status:  oriented to person, place and time and normal affect  Lungs:  Diminished lung sounds in left upper and lower lung fields; + egophony in left lower lung field, no wheezes or ronchi present  Heart:  regular rate and rhythm, no murmur  Chest wall: right-sided port present  Abdomen:  soft, nontender, nondistended, normal bowel sounds, no masses, hepatomegaly, splenomegaly  Extremities:  no redness or tenderness in the calves; left lower extremity > right lower extremity; left upper extremity ?> right upper extremity; no pitting edema noted  Skin:  Multiple ecchymoses scattered throughout upper arms and on right anterior chest wall    Assessment:        Hospital Problems           Last Modified POA    * (Principal) Acute respiratory failure with hypoxia ([address] 5/26/2020 Yes    PAF (paroxysmal atrial fibrillation) ([address] 5/24/2020 Yes    Small cell lung cancer ([address] 5/24/2020 Yes    Pneumonia of left lower lobe due to infectious organism ([address] 5/24/2020 Yes    KRISTINA (acute kidney injury) ([address] 5/26/2020 Yes    Supratherapeutic INR 5/24/2020 Yes    Pleural effusion on left 5/24/2020 Yes    Lactic acidosis 5/25/2020 Yes    Acute deep vein thrombosis (DVT) of left upper extremity ([address] 5/26/2020 Yes    Essential hypertension 5/24/2020 Yes    IGT (impaired glucose tolerance) (Chronic) 5/24/2020 Yes    Tobacco abuse (Chronic) 5/24/2020 Yes    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 9) 5/24/2020 Yes          Plan:        1. Appreciate oncology and pulmonology recommendations  2. Continue to hold any and all anticoagulation  3. Patient will follow-up with oncology for chemotherapy initiation once medically stable. Possibly next Mon/Tues/Wed. 4. Appreciate pulmonology recs - left thoracentesis with IR on 5/26 with removal of 1.5 L of old blood. Repeat chest x-ray this morning with persistent left-sided white out  5. Patient may ultimately need left-sided chest tube/Pleurx catheter  6. Supplemental O2 to maintain SpO2 > 90% (currently on 3L via NC)  7. Incentive spirometry, Acapella  8. Maintain off of antibiotics  9. Lactic acidosis is noted. Unsure of cause but etiologies may include malignancy, hypoprofusion due to hypotension PTA in the setting of renal failure, or pulmonary edema/hypoxemia. Continue to trend until less than 2.0. Patient does not look acutely ill  10. Hold parameters on anti-hypertensives  11. Hep-Lock IV fluids  12. Continue bladder scans/PRN straight caths  13. Strict I/Os  14. Avoid nephrotoxins  15. INR is 1.5 today  16. Potassium was uptrending  17. Checking hepatic function panel today to rule out evidence of hemolysis  18. Ensure BID, appreciate dietary recs  19. Monitor QTC  20. Prognosis is guarded  21. Disposition: Patient will likely need left-sided Pleurx catheter. Very poor prognosis.       33 Nancy Chavez DO  5/27/2020  9:46 AM

## 2020-05-27 NOTE — PROGRESS NOTES
05/27/20  0432   WBC 14.8*  --  14.8*   HGB 9.2* 9.2* 9.0*   HCT 30.4* 29.7* 29.5*     --  224     Magnesium:No results for input(s): MG in the last 72 hours. BMP:  Recent Labs     05/26/20  0451 05/27/20 0432    138   K 5.2 5.7*   CALCIUM 9.5 9.5   CO2 24 26   BUN 60* 47*   CREATININE 0.98 0.78   LABGLOM >60 >60   GLUCOSE 195* 209*     BNP:No results for input(s): BNP in the last 72 hours. PT/INR:  Recent Labs     05/26/20  0812 05/27/20 0432   PROTIME 19.5* 17.6*   INR 1.7 1.5     APTT:  Recent Labs     05/26/20 0812   APTT 28.2     CARDIAC ENZYMES:  No results for input(s): CKTOTAL, CKMB, CKMBINDEX, TROPHS in the last 72 hours. FASTING LIPID PANEL:  Lab Results   Component Value Date    HDL 36 05/05/2020    TRIG 108 05/05/2020     LIVER PROFILE:No results for input(s): AST, ALT, LABALBU, ALKPHOS, BILITOT, BILIDIR, IBILI, PROT, GLOB, ALBUMIN in the last 72 hours.     Current Meds:    ipratropium-albuterol  1 ampule Inhalation Q4H WA    guaiFENesin  600 mg Oral BID    piperacillin-tazobactam  3.375 g Intravenous Q8H    aspirin  81 mg Oral Daily    atorvastatin  40 mg Oral Nightly    budesonide-formoterol  2 puff Inhalation BID    dexamethasone  4 mg Oral Q8H    dilTIAZem  240 mg Oral Daily    [Held by provider] dofetilide  500 mcg Oral 2 times per day    metoprolol succinate  100 mg Oral Daily    pantoprazole  40 mg Oral QAM AC    sodium chloride flush  10 mL Intravenous 2 times per day     Continuous Infusions:    sodium chloride 75 mL/hr at 05/24/20 0940       Electronically signed Jared Fowler MD 9:46 AM 5/27/2020

## 2020-05-27 NOTE — PLAN OF CARE
Problem: Falls - Risk of:  Goal: Will remain free from falls  Description: Will remain free from falls  Outcome: Ongoing  Goal: Absence of physical injury  Description: Absence of physical injury  Outcome: Ongoing     Problem: Breathing Pattern - Ineffective  Goal: Effective breathing pattern  Outcome: Ongoing     Problem: Nutrition  Goal: Optimal nutrition therapy  Outcome: Ongoing     Problem: IP BALANCE  Goal: LTG - Patient will maintain balance to allow for safe/functional mobility  Outcome: Ongoing

## 2020-05-27 NOTE — PROGRESS NOTES
safety and independence requiring 2 skilled therapy professionals to address individual discipline's goals. OT addressing preparation for ADL transfer, sitting balance for increased ADL performance, sitting/activity tolerance, environmental safety/scanning, fall prevention, functional mobility for ADL transfers and functional UE strength. Upon writer exit, call light within reach, pt retired to chair. All lines intact and patient positioned comfortably. All patient needs addressed prior to ending therapy session. Chart reviewed prior to treatment and patient is agreeable for therapy. RN reports patient is medically stable for therapy treatment this date.     Roxanne Bobo, SHANE/L

## 2020-05-27 NOTE — PROGRESS NOTES
normal coloration and turgor, no rashes, no suspicious skin lesions noted ,    DATA:    Labs:   CBC:   Recent Labs     05/26/20  0451 05/26/20  1436 05/27/20  0432   WBC 14.8*  --  14.8*   HGB 9.2* 9.2* 9.0*   HCT 30.4* 29.7* 29.5*     --  224     BMP:   Recent Labs     05/26/20  0451 05/27/20  0432    138   K 5.2 5.7*   CO2 24 26   BUN 60* 47*   CREATININE 0.98 0.78   LABGLOM >60 >60   GLUCOSE 195* 209*     PT/INR:   Recent Labs     05/26/20  0812 05/27/20  0432   PROTIME 19.5* 17.6*   INR 1.7 1.5       IMAGING DATA:      Primary Problem  Acute respiratory failure with hypoxia [facility])    Active Hospital Problems    Diagnosis Date Noted    Acute deep vein thrombosis (DVT) of left upper extremity (HCC) [I82.622] 05/26/2020     Priority: High    Lactic acidosis [E87.2] 05/25/2020     Priority: High    Supratherapeutic INR [R79.1] 05/24/2020     Priority: High    Pleural effusion on left [J90] 05/24/2020     Priority: High    Obesity (BMI 30-39. 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 Acute resp failure  2. Metastatic small cell lung cancer  3. hemoptysis due to tumor  4. afib   5. Elevated INR due to DOAC   6. KRISTINA    RECOMMENDATIONS:  1. Considering options. The fluid accumulated gain. He might need a Pleurx catheter  2.  The dilemma is if we continue him off

## 2020-05-27 NOTE — PROGRESS NOTES
Patient will be indep with HEP. Short term goal 5: Patient will negotiate 3 steps with rails and supervision.   Patient Goals   Patient goals : Improve dizziness    Plan    Plan  Times per week: 1-2x/d, 5-6 d/wk  Current Treatment Recommendations: Strengthening, ROM, Balance Training, Functional Mobility Training, Transfer Training, Home Exercise Program, Safety Education & Training, Patient/Caregiver Education & Training, Stair training, Gait Training  Safety Devices  Type of devices: Nurse notified, Left in chair, Gait belt, Call light within reach, All fall risk precautions in place     Therapy Time   Individual Concurrent Group Co-treatment   Time In  0940         Time Out  1022         Minutes  [address] [place] [address]

## 2020-05-27 NOTE — PROGRESS NOTES
.. PALLIATIVE CARE NURSING ASSESSMENT    Patient: Emmanuel Mix  Room: [facility]/3000-06    Reason For Consult   Goals of care evaluation  Distress management  Guidance and support  Facilitate communications  Assistance in coordinating care      Impression: Emmanuel Mix is a 77y.o. 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 .  Currently hospitalized for the management of KRISTINA. The Palliative Care Team is following to assist with cancer support. Vital Signs  Blood pressure 110/62, pulse 120, temperature 97.7 °F (36.5 °C), temperature source Temporal, resp. rate 20, height 6' 2\" (1.88 m), weight 298 lb (135.2 kg), SpO2 95 %. 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 9)    Pleural effusion on left    Lactic acidosis    Acute deep vein thrombosis (DVT) of left upper extremity (HCC)       Palliative Interaction: Pt known to our service from last admission (last week).  Today, he is

## 2020-05-27 NOTE — PROGRESS NOTES
Pulmonary Critical Care Progress Note  Nelda Carter CNP / Dr. Kala Franco M.D. Patient seen for the follow up of Acute respiratory failure with hypoxia ([address] , metastatic small cell lung cancer, hemoptysis, hemorrhagic left pleural effusion    Subjective:  He is sitting up in a chair, continues to have some shortness of breath, not significantly changed. He denies any chest pain. He continues to experience some hemoptysis however improved from previous. His appetite is fair, minimal p.o. intake. Length of stay: 4 Days    Examination:  Vitals: /62   Pulse 120   Temp 97.7 °F (36.5 °C) (Temporal)   Resp 20   Ht 6' 2\" (1.88 m)   Wt 298 lb (135.2 kg)   SpO2 95%   BMI 38.26 kg/m²     General appearance: alert and cooperative with exam, sitting up in chair in no distress  Neck: No JVD  Lungs: Poor air exchange throughout left lung fields, moderate to decreased on the right  Heart: regular rate and rhythm, S1, S2 normal, no gallop  Abdomen: Soft, non tender, + BS  Extremities: no cyanosis or clubbing.   Extensive edema upper and lower extremities    LABs:  CBC:   Recent Labs     05/26/20  0451 05/26/20  1436 05/27/20  0432   WBC 14.8*  --  14.8*   HGB 9.2* 9.2* 9.0*   HCT 30.4* 29.7* 29.5*     --  224     BMP:   Recent Labs     05/26/20  0451 05/27/20  0432    138   K 5.2 5.7*   CO2 24 26   BUN 60* 47*   CREATININE 0.98 0.78   LABGLOM >60 >60   GLUCOSE 195* 209*     PT/INR:   Recent Labs     05/26/20  0812 05/27/20  0432   PROTIME 19.5* 17.6*   INR 1.7 1.5     APTT:  Recent Labs     05/26/20  0812   APTT 28.2     LIVER PROFILE:  Recent Labs     05/27/20  0432   AST 49*   ALT 23   LABALBU 2.7*     Radiology:  5/27/20      Impression:  · Acute respiratory insufficiency  · Acute Exacerbation of COPD  · Metastatic small cell lung cancer with massive mediastinal and left hilar adenopathy and brain metastasis   · Hemoptysis  · Hemorrhagic left pleural effusion with significant atelectasis

## 2020-05-27 NOTE — FLOWSHEET NOTE
Writer rounding on  Unit. Patient sitting in bedside chair. Patient engages in brief conversation with Xiang Lewis. He shares a bit about his current admsision and illness. He expresses some anxiety, but appears to be coping appropriately. Writer offers support and presence, and patient expresses thanks. Spiritual Care will follow as needed.        05/27/20 4068   Encounter Summary   Services provided to: Patient   Referral/Consult From: Rounding   Continue Visiting   (5/27/20)   Complexity of Encounter Low   Length of Encounter 15 minutes   Spiritual Assessment Completed Yes   Routine   Type Initial   Assessment Approachable   Intervention Active listening   Outcome Engaged in conversation;Expressed gratitude

## 2020-05-28 NOTE — PROGRESS NOTES
Occupational Therapy  Facility/Department: [facility] CVICU  Daily Treatment Note  NAME: Henri Stratton  : 1945  MRN: [id number]    Date of Service: 2020    Discharge Recommendations:  Subacute/Skilled Nursing Facility  OT Equipment Recommendations  Equipment Needed: Yes  Mobility Devices: ADL Assistive Devices  ADL Assistive Devices: Reacher;Long-handled Shoe Horn;Long-handled Sponge;Emergency Alert System;Grab Bars - shower    Assessment   Performance deficits / Impairments: Decreased functional mobility ; Decreased endurance;Decreased safe awareness;Decreased ADL status; Decreased balance;Decreased high-level IADLs;Decreased posture;Decreased coordination  Assessment: Skilled OT is indicated to increase I and safety awareness with functional performance to return home with assist as needed. Prognosis: Fair  OT Education: OT Role;Plan of Care;Transfer Training;Energy Conservation; ADL Adaptive Strategies  REQUIRES OT FOLLOW UP: Yes  Activity Tolerance  Activity Tolerance: Patient limited by fatigue  Safety Devices  Safety Devices in place: Yes  Type of devices: Call light within reach; Patient at risk for falls;Gait belt;Nurse notified; Left in bed;Bed alarm in place         Patient Diagnosis(es): The primary encounter diagnosis was Malignant neoplasm of left lung, unspecified part of lung ([facility] [address] Diagnoses of KRISTINA (acute kidney injury) ([facility] [address] and Hypoxia were also pertinent to this visit. 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 has a past surgical history that includes Tunneled venous port placement (2020).     Restrictions  Restrictions/Precautions  Restrictions/Precautions: Fall Risk  Position Activity Restriction  Other position/activity restrictions: 3 L O2 per NC, up with assist, RUE IV   Subjective   General  Chart Reviewed: Yes  Patient assessed for rehabilitation services?: Yes  Response to previous treatment: Patient with no complaints from previous session  Family / Caregiver Present: No      Orientation  Orientation  Overall Orientation Status: Within Functional Limits  Objective    ADL  Additional Comments: Patient declined ADL's        Functional Mobility  Functional Mobility Comments: Patient declined  Bed mobility  Comment: patient declined  Transfers  Transfer Comments: Patient declined                       Cognition  Overall Cognitive Status: Exceptions  Arousal/Alertness: Appropriate responses to stimuli  Following Commands:  Follows all commands without difficulty  Attention Span: Appears intact  Memory: Decreased short term memory  Safety Judgement: Decreased awareness of need for safety;Decreased awareness of need for assistance  Problem Solving: Assistance required to identify errors made;Assistance required to correct errors made;Decreased awareness of errors  Insights: Decreased awareness of deficits  Initiation: Requires cues for some  Sequencing: Does not require cues                                         Plan   Plan  Times per week: 4-5x/week 1x/day as nash   Current Treatment Recommendations: Strengthening, Balance Training, Neuromuscular Re-education, Safety Education & Training, Self-Care / ADL, Equipment Evaluation, Education, & procurement, Endurance Training, Functional Mobility Training, Patient/Caregiver Education & Training, Home Management Training    AM-PAC Score        AM-PAC Inpatient Daily Activity Raw Score: 15 (05/28/20 1117)  AM-PAC Inpatient ADL T-Scale Score : 34.69 (05/28/20 1117)  ADL Inpatient CMS 0-100% Score: 56.46 (05/28/20 1117)  ADL Inpatient CMS G-Code Modifier : CK (05/28/20 1117)    Goals  Short term goals  Time Frame for Short term goals: by discharge, pt to demo   Short term goal 1: bed mob tasks with use of rail as needed to MOD I. Short term goal 2: I with BUE HEP with use of hand outs as needed to maintain strength. Short term goal 3: UB ADL to set up and LB ADL to SBA with use of AD/AE as needed. Short term goal 4: toileting tasks with use of BSC/AD and grab bar as needed to SBA. Short term goal 5: ADL transfers and functional mob with AD as needed to SBA. Long term goals  Long term goal 1: Pt to stand with SUP and AD nash > 5 mins to reduce falls. Long term goal 2: Pt to be with EC/WS and fall prevention tech as well as AE/DME recommendations with use of hand outs as needed. Patient Goals   Patient goals : get home soon! Therapy Time   Individual Concurrent Group Co-treatment   Time In 0835         Time Out 0852         Minutes 17          Patient declined OOB actvitiy, patient completed 10 reps of 3 exercises then stated he is too fatigued to complete anymore. Patient is visibly SOB, sats 95% and BP:123/76  Upon writer exit, call light within reach, pt retired to bed. All lines intact and patient positioned comfortably. All patient needs addressed prior to ending therapy session. Chart reviewed prior to treatment and patient is agreeable for therapy. RN reports patient is medically stable for therapy treatment this date.       SHANE Duque/ZULMA

## 2020-05-28 NOTE — PROGRESS NOTES
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 9) 5/24/2020 Yes          Plan:        1. Appreciate oncology and pulmonology recommendations  2. Continue to hold any and all anticoagulation  3. Per oncology, starting chemotherapy on Friday, Saturday, Sunday while inpatient  4. Appreciate pulmonology recs - left thoracentesis with IR on 5/26 with removal of 1.5 L of old blood. Repeat chest x-ray this morning with persistent left-sided white out patient is now status post left-sided chest tube placement with removal of 1.7  liters of old blood  5. Supplemental O2 to maintain SpO2 > 90% (currently on 3L via NC); wean as tolerated  6. Incentive spirometry, Acapella  7. Lactic acidosis is noted and is currently uptrending. Unsure of cause but etiologies may include malignancy, hypoprofusion due to hypotension, or pulmonary edema/hypoxemia. Continue to trend until less than 2.0. Patient does not look acutely ill  8. Hold parameters on anti-hypertensives  9. Maintain off of IV fluids and antibiotics. 10. Continue bladder scans/PRN straight caths  11. Strict I/Os  12. Avoid nephrotoxins  13. INR is 1.5 yesterday, her potassium was 5.7 yesterday. Patient refused a.m. lab draws this morning. Redraw in the morning. Including INR and renal function panel. 14. Ensure BID, appreciate dietary recs  15. Monitor QTC  16. Prognosis is guarded  17. Disposition: Patient will likely need left-sided Pleurx catheter. Very poor prognosis. 25. Discussed with patient's wife for 5 minutes this morning - working on getting her to come to the hospital to visit with her  in the morning.     33 Nancy Chavez DO  5/28/2020  4:37 PM

## 2020-05-28 NOTE — PROGRESS NOTES
Patient Name: Gómez Ness  Date of admission: 5/23/2020  7:34 AM  Patient's age: 77 y.o., 1945  Admission Dx: KRISTINA (acute kidney injury) ([facility] [address] [N17.9]    Reason for Consult: management recommendations  Requesting Physician: Iftikhar Michele DO    CHIEF COMPLAINT:  Lung cancer    INTERIM HISTORY  Since he seen and evaluated. Condition is about the same  No bleeding  Plan for pleurx cath today   Arm swelling is a little better but not by much. INR is down to 1.5. Off anticoagulation. HISTORY OF PRESENT ILLNESS:      The patient is a 77 y.o.  male who is admitted to the hospital for acute respiratory failure. And progressive hemoptysis  Pt is known to us with small cell lung cancer, stage 4. We plan to start chemo int he next few days   He has afib on anticoagulation. Pt is seen and examined, + hemoptysis. INR elevated, likely due to DOAC. 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 Past Surgical History:   has a past surgical history that includes Tunneled venous port placement (05/19/2020). Medications:    Reviewed in Epic     Allergies:  Patient has no known allergies. Social History:   reports that he quit smoking about 3 weeks ago. His smoking use included cigars and cigarettes. He has a 50.00 pack-year smoking history. He has never used smokeless tobacco. He reports previous alcohol use. He reports that he does not use drugs. Family History: family history includes Heart Disease in his father and mother; Hemochromatosis in his sister. REVIEW OF SYSTEMS:    Constitutional: No fever or chills.  No night normal, no pedal edema, no clubbing or cyanosis , arm swelling is worsening. Skin - normal coloration and turgor, no rashes, no suspicious skin lesions noted ,    DATA:    Labs:   CBC:   Recent Labs     05/27/20  0432 05/28/20  0337   WBC 14.8* 15.0*   HGB 9.0* 9.1*   HCT 29.5* 30.3*    225     BMP:   Recent Labs     05/26/20  0451 05/27/20  0432    138   K 5.2 5.7*   CO2 24 26   BUN 60* 47*   CREATININE 0.98 0.78   LABGLOM >60 >60   GLUCOSE 195* 209*     PT/INR:   Recent Labs     05/26/20  0812 05/27/20 0432   PROTIME 19.5* 17.6*   INR 1.7 1.5       IMAGING DATA:      Primary Problem  Acute respiratory failure with hypoxia [facility])    Active Hospital Problems    Diagnosis Date Noted    Acute deep vein thrombosis (DVT) of popliteal vein of left lower extremity (HCC) [I82.432] 05/27/2020     Priority: High    Acute deep vein thrombosis (DVT) of left upper extremity (HCC) [I82.622] 05/26/2020     Priority: High    Lactic acidosis [E87.2] 05/25/2020     Priority: High    Supratherapeutic INR [R79.1] 05/24/2020     Priority: High    Pleural effusion on left [J90] 05/24/2020     Priority: High    Obesity (BMI 30-39. 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 [address] [I48.0]     Tobacco abuse [Z72.0] 03/21/2013    IGT (impaired glucose tolerance) [R73.02] 09/20/2012    Essential hypertension [I10]          IMPRESSION:   1. Acute resp failure  2.  Metastatic small cell lung cancer  3. hemoptysis due to tumor, stopped after

## 2020-05-28 NOTE — PROGRESS NOTES
Pulmonary Critical Care Progress Note  Edilia Dunn CNP / Dr. Cony Lind M.D. Patient seen for the follow up of Acute respiratory failure with hypoxia ([address] , metastatic small cell lung cancer, hemoptysis, hemorrhagic left pleural effusion    Subjective:  He is resting in bed in no distress. He is anxious for catheter placement this morning, upset that he has been n.p.o. His shortness of breath is at his baseline, he denies significant cough, no significant hemoptysis. He denies any chest pain. Length of stay: 5 Days    Examination:  Vitals: /69   Pulse 130   Temp 96.9 °F (36.1 °C) (Temporal)   Resp 16   Ht 6' 2\" (1.88 m)   Wt 298 lb 3.2 oz (135.3 kg)   SpO2 98%   BMI 38.29 kg/m²     General appearance: alert and cooperative with exam, resting in bed in no distress  Neck: No JVD  Lungs: Poor air exchange throughout left lung fields, moderate to decreased on the right  Heart: regular rate and rhythm, S1, S2 normal, no gallop  Abdomen: Soft, non tender, + BS  Extremities: no cyanosis or clubbing. Extensive edema upper and lower extremities    LABs:  CBC:   Recent Labs     05/27/20 0432 05/28/20  0337   WBC 14.8* 15.0*   HGB 9.0* 9.1*   HCT 29.5* 30.3*    225     BMP:   Recent Labs     05/26/20  0451 05/27/20  0432    138   K 5.2 5.7*   CO2 24 26   BUN 60* 47*   CREATININE 0.98 0.78   LABGLOM >60 >60   GLUCOSE 195* 209*     PT/INR:   Recent Labs     05/26/20  0812 05/27/20  0432   PROTIME 19.5* 17.6*   INR 1.7 1.5     APTT:  Recent Labs     05/26/20 0812   APTT 28.2     LIVER PROFILE:  Recent Labs     05/27/20 0432   AST 49*   ALT 23   LABALBU 2.7*     Surgical Pathology Report 05/26/2020  8:47 AM [employer]   -- Diagnosis --   THORACENTESIS FLUID (LEFT):  NEGATIVE FOR MALIGNANCY.       Radiology:  5/27/20      Impression:  · Acute respiratory insufficiency  · Acute Exacerbation of COPD  · Metastatic small cell lung cancer with massive mediastinal and left

## 2020-05-28 NOTE — PROGRESS NOTES
05/27/20 2336   NIV Type   Skin Assessment Clean, dry, & intact   Skin Protection for O2 Device Yes   Equipment ID #6   NIV Started/Stopped On   Equipment Type v60   Mode Bilevel   Mask Type Full face mask   Mask Size Medium   Settings/Measurements   IPAP 14 cmH20   CPAP/EPAP 8 cmH2O   Rate Ordered 10   Resp 25   FiO2  35 %   I Time/ I Time % 0.8 s   Vt Exhaled 641 mL   Minute Volume 14.5 Liters   Mask Leak (lpm) 59 lpm   Comfort Level Good   Using Accessory Muscles No   placed pt on bipap at this time. RN informed.

## 2020-05-29 NOTE — PROGRESS NOTES
Carboplatin infusion initiated. Pt instructed to inform writer of any discomfort, or any signs or symptoms or adverse reactions. Port has brisk blood return.

## 2020-05-29 NOTE — PROGRESS NOTES
Etoposide infusion completed. Pt tolerated infusion well, with no adverse signs or symptoms noted. VSS, brisk blood return from port.

## 2020-05-29 NOTE — PROGRESS NOTES
Occupational Therapy  DATE: 2020    NAME: Kirill Mcneill  MRN: [id number]   : 1945  [facility]  Occupational Therapy Not Seen Note    Patient not available for Occupational Therapy due to:    [] Testing:    [] Hemodialysis    [] Cancelled by RN:    [x]Refusal by Patient:    [] Surgery:     [] Intubation:     [] Pain Medication:    [] Sedation:     [] Spine Precautions :    [] Medical Instability:    [x] Other: Patient visiting with wife upon arrival, chemo at 1pm this date    [employer], LYNN/L

## 2020-05-29 NOTE — PROGRESS NOTES
Pulmonary Critical Care Progress Note  Oscar Holder M.D. Patient seen for the follow up of Acute respiratory failure with hypoxia ([facility] [address] , metastatic small cell lung cancer, hemoptysis, hemorrhagic left pleural effusion    Subjective:  He is sitting up in the bedside recliner. He denies any chest pain. He denies significant cough. His shortness of breath is not much change. He denies any hemoptysis. Length of stay: 6 Days    Examination:  Vitals: /60   Pulse 120   Temp 96.9 °F (36.1 °C) (Temporal)   Resp 16   Ht 6' 2\" (1.88 m)   Wt (!) 305 lb (138.3 kg)   SpO2 93%   BMI 39.16 kg/m²     General appearance: alert and cooperative with exam, resting in bed in no distress  Neck: No JVD  Lungs: Poor air exchange throughout left lung fields, moderate to decreased on the right  Heart: regular rate and rhythm, S1, S2 normal, no gallop  Abdomen: Soft, non tender, + BS  Extremities: no cyanosis or clubbing. Extensive edema upper and lower extremities    LABs:  CBC:   Recent Labs     05/28/20  0337 05/29/20  0253   WBC 15.0* 15.3*   HGB 9.1* 9.2*   HCT 30.3* 29.9*    226     BMP:   Recent Labs     05/29/20  0749 05/29/20  1151    136   K 6.0* 5.6*   CO2 23 25   BUN 43* 45*   CREATININE 0.76 0.84   LABGLOM >60 >60   GLUCOSE 226* 308*     PT/INR:   Recent Labs     05/27/20  0432   PROTIME 17.6*   INR 1.5     APTT:  No results for input(s): APTT in the last 72 hours. LIVER PROFILE:  Recent Labs     05/27/20  0432 05/29/20  0749   AST 49* 47*   ALT 23 25   LABALBU 2.7* 2.8*     Surgical Pathology Report 05/26/2020  8:47 AM [facility]   -- Diagnosis --   THORACENTESIS FLUID (LEFT):  NEGATIVE FOR MALIGNANCY.       Radiology:  5/28/20      Impression:  · Acute respiratory insufficiency  · Acute Exacerbation of COPD  · Metastatic small cell lung cancer with massive mediastinal and left hilar adenopathy and brain metastasis   · Hemoptysis  · Hemorrhagic left pleural effusion with

## 2020-05-29 NOTE — PROGRESS NOTES
DATE: 2020    NAME: June Barr  MRN: [id number]   : 1945    Patient not seen this date for Physical Therapy due to:  [] Blood transfusion in progress  [] Cancel by RN  [] Hemodialysis  [x]  Refusal by Patient (pt receiving chemo)  [] Spine Precautions   [] Strict Bedrest  [] Surgery  [] Testing      [] Other        [] PT being discontinued at this time. Patient independent. No further needs. [] PT being discontinued at this time as the patient has been transferred to hospice care. No further needs.     EUNICE WOO, PTA

## 2020-05-29 NOTE — PROGRESS NOTES
Carboplatin infusion completed. Pt rested comfortably throughout entire infusion. VSS, no distress noted; brisk blood return noted from port.

## 2020-05-29 NOTE — PROGRESS NOTES
Etopside infusion initiated. Pt instructed to inform writer of any discomfort, or any adverse signs or symptoms. VSS, brisk blood return noted from port. Will continue to monitor.

## 2020-05-29 NOTE — PROGRESS NOTES
chest was repeated and showed interval progression of lung mass.      Patient is awaiting chemotherapy. He had port placed prior to the hospitalization. 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     - Holding anticoagulation   - To undergo IR guided thora on Tuesday. - KRISTINA has resolved  - Long discussion about holding AC in light of DVT vs continuing with hemoptysis  - Lactic acid continues to uptrend  - Chest tube placed on 5/28 with removal of 1.7 L of old blood. - Scheduled to start chemotherapy on 5/29    Review of Systems:     Constitutional:  negative for chills, fevers, sweats  Respiratory: positive for shortness of breath, which persists, positive for coughing. Improvement of hemoptysis. negative for wheezing  Cardiovascular:  negative for chest pain, chest pressure/discomfort, lower extremity edema, palpitations  Gastrointestinal:  negative for abdominal pain, constipation, diarrhea, nausea, vomiting  MSK: reports left upper arm swelling  Neurological:  negative for dizziness, headache    Medications:      Allergies:  No Known Allergies    Current Meds:   Scheduled Meds:    ipratropium-albuterol  1 ampule Inhalation Q4H WA    guaiFENesin  600 mg Oral BID    aspirin  81 mg Oral Daily    atorvastatin  40 mg Oral Nightly    budesonide-formoterol  2 puff Inhalation BID    dexamethasone  4 mg Oral Q8H    dilTIAZem  240 mg Oral Daily    [Held by provider] dofetilide  500 mcg Oral 2 times per day    metoprolol succinate  100 mg Oral Daily    pantoprazole  40 mg Oral QAM AC    sodium chloride flush  10 mL Intravenous 2 times per day     Continuous Infusions:    dextrose      sodium chloride 10 mL/hr at 05/27/20 1726     PRN Meds: glucose, dextrose, glucagon (rDNA), dextrose, traMADol, acetaminophen, benzonatate, sodium chloride flush, potassium chloride **OR** potassium alternative oral replacement **OR** potassium chloride, magnesium sulfate, polyethylene

## 2020-05-30 NOTE — PROGRESS NOTES
Physical Therapy  Facility/Department: [facility] CVICU  Daily Treatment Note  NAME: Ivan Marie  : 1945  MRN: [id number]    Date of Service: 2020    Discharge Recommendations:  Subacute/Skilled Nursing Facility        Assessment   Body structures, Functions, Activity limitations: (P) Decreased functional mobility ; Decreased ADL status; Decreased strength;Decreased endurance;Decreased balance;Decreased posture  REQUIRES PT FOLLOW UP: (P) Yes  Activity Tolerance  Activity Tolerance: (P) Patient limited by fatigue;Patient limited by endurance     Patient Diagnosis(es): The primary encounter diagnosis was Malignant neoplasm of left lung, unspecified part of lung ([facility] [address] 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 has a past surgical history that includes Tunneled venous port placement (2020). Restrictions  Restrictions/Precautions  Restrictions/Precautions: Fall Risk  Position Activity Restriction  Other position/activity restrictions: 3 L O2 per NC, up with assist, RUE IV   Subjective   Subjective  Subjective: (P) The patient notes he just underwent chemotherapy and is exhausted but agrees to minimal session activity. General Comment  Comments: (P) RN, Natasha Chamorro, approves patient for treatment, also noting he just completed chemo.           Orientation     Cognition      Objective   Bed mobility  Scooting: (P) Minimal assistance     Ambulation  Ambulation?: (P) No(Pt.

## 2020-05-30 NOTE — PROGRESS NOTES
Etoposide infusion initiated. /54, P-130, rr-24, t-98.0, o2 sat 92%. Signs and symptoms of a reaction to chemo explained and reviewed with patient. Pt relates understanding, denies any complaints of at this time. Port with excellent blood return prior to infusion. Pt instructed to inform writer if he feels any different during the infusion.

## 2020-05-31 PROBLEM — R00.0 SINUS TACHYCARDIA: Status: ACTIVE | Noted: 2020-01-01

## 2020-05-31 NOTE — PROGRESS NOTES
--  216 234   MPV 11.6  --  11.6 11.4   INR  --  1.3  --   --      Chemistry:  Recent Labs     05/29/20  0749 05/29/20  1151 05/30/20  0426    136 137   K 6.0* 5.6* 5.6*   CL 98 96* 97*   CO2 23 25 25   GLUCOSE 226* 308* 241*   BUN 43* 45* 48*   CREATININE 0.76 0.84 0.91   ANIONGAP 15 15 15   LABGLOM >60 >60 >60   GFRAA >60 >60 >60   CALCIUM 9.6 9.6 9.3     Recent Labs     05/29/20  0749   PROT 5.8*   LABALBU 2.8*   AST 47*   ALT 25   ALKPHOS 55   BILITOT 0.47     ABG:No results found for: POCPH, PHART, PH, POCPCO2, YOU0NAN, PCO2, POCPO2, PO2ART, PO2, POCHCO3, WOJ1AAP, HCO3, NBEA, PBEA, BEART, BE, THGBART, THB, PKC6CPV, LWRQ0AVJ, H5PDBLAF, O2SAT, FIO2  Lab Results   Component Value Date/Time    SPECIAL NOT REPORTED 05/26/2020 12:40 PM    SPECIAL NOT REPORTED 05/26/2020 12:40 PM     Lab Results   Component Value Date/Time    CULTURE NO GROWTH 5 DAYS 05/26/2020 12:40 PM    CULTURE PENDING 05/26/2020 12:40 PM       Radiology:  Xr Chest Portable    Result Date: 5/23/2020  Overall, is been no significant change in chest findings compared to the May 21, 2020 study. Near complete whiteout of the left hemithorax is seen likely related to obstruction/lung atelectasis by the patient's known lung cancer. Xr Chest Portable    Result Date: 5/21/2020  No significant change in the near complete opacification of the left hemithorax reflecting mass associated with effusion. Xr Chest Portable    Result Date: 5/19/2020  Progressive opacification of the left hemithorax. Port catheter tip in the SVC with no pneumothorax. Ct Chest Pulmonary Embolism W Contrast    Result Date: 5/23/2020  1. No CT evidence of acute pulmonary embolism. 2. Interval progression of the patient's known large  left lung mass with only residual minimal aeration of both the left upper lower lobes when compared to the prior CT study.  3. Masslike metastatic lymphadenopathy involving the mediastinal left hilar regions, grossly similar to the prior study. 4. Moderate left-sided pleural effusion. Ir Port Placement > 5 Years    Result Date: 5/19/2020  Successful ultrasound and fluoroscopy guided right internal jugular vein 8 [place] power injectable Port-A-Cath placement. Ready for use. Mri Brain W Wo Contrast    Result Date: 5/21/2020  Rounded focus of enhancement and restricted diffusion in the left frontal lobe concerning for metastatic disease. There is a smaller punctate enhancing focus medial to this in the left frontal lobe as well.        Physical Examination:        General appearance:  alert, cooperative and no distress, sitting up in bed attempting to eat breakfast this morning  Mental Status:  oriented to person, place and time and normal affect  Lungs:  Diminished lung sounds in left upper and lower lung fields; he does have improved aeration on anterior listening posts of the left lung today; no wheezes or ronchi present  Heart:  Tachycardic (has been tachycardic since admission), regular rhythm, no murmur  Chest wall: right-sided port present; left-sided chest wall tube present  Abdomen:  soft, nontender, nondistended, normal bowel sounds, no masses, hepatomegaly, splenomegaly  Extremities:  no redness or tenderness in the calves; left lower extremity > right lower extremity in terms of pitting edema; left upper extremity >> right upper extremity, left upper extremity is now wrapped with ACE bandage  Skin:  Multiple ecchymoses scattered throughout upper arms and on right anterior chest wall    Assessment:        Hospital Problems           Last Modified POA    * (Principal) Acute respiratory failure with hypoxia ([address] 5/26/2020 Yes    PAF (paroxysmal atrial fibrillation) ([address] 5/24/2020 Yes    Small cell lung cancer ([address] 5/24/2020 Yes    Pneumonia of left lower lobe due to infectious organism ([address] 5/24/2020 Yes    KRISTINA (acute kidney injury) ([address] 5/26/2020 Yes    Supratherapeutic INR 5/24/2020 Yes    Recurrent left pleural effusion

## 2020-05-31 NOTE — PROGRESS NOTES
Patient Name: Tita Rutledge  Date of admission: 5/23/2020  7:34 AM  Patient's age: 77 y.o., 1945  Admission Dx: KRISTINA (acute kidney injury) ([facility] [address] [N17.9]    Reason for Consult: management recommendations  Requesting Physician: Shazia Tyler DO    CHIEF COMPLAINT:  Lung cancer    SUBJECTIVE:  . The patient was seen and examined. Started on chemotherapy yesterday. Tolerated well so far. No N/V. No fever. No headache. Continues to have SOB. No hemoptysis. BRIEF CASE HISTORY:    The patient is a 77 y.o.  male who is admitted to the hospital for acute respiratory failure. And progressive hemoptysis  Pt is known to us with small cell lung cancer, stage 4. We plan to start chemo int he next few days   He has afib on anticoagulation. Pt is seen and examined, + hemoptysis. INR elevated, likely due to DOAC. 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 Past Surgical History:   has a past surgical history that includes Tunneled venous port placement (05/19/2020). Medications:    Reviewed in Epic     Allergies:  Patient has no known allergies. Social History:   reports that he quit smoking about 4 weeks ago. His smoking use included cigars and cigarettes. He has a 50.00 pack-year smoking history. He has never used smokeless tobacco. He reports previous alcohol use. He reports that he does not use drugs. Family History: family history includes Heart Disease in his father and mother; Hemochromatosis in his sister. REVIEW OF SYSTEMS:    Constitutional: No fever or chills.  No night sweats, +weight loss and fatigue   Eyes: No

## 2020-05-31 NOTE — PROGRESS NOTES
to the left indicating large degree of volume loss.  Right infrahilar   atelectasis or infiltrate.  The heart is obscured as is the large majority of   the mediastinum.            5/28/20      Impression:  · Acute respiratory insufficiency  · Acute Exacerbation of COPD  · Metastatic small cell lung cancer with massive mediastinal and left hilar adenopathy and brain metastasis   · Hemoptysis  · Hemorrhagic left pleural effusion with significant atelectasis secondary to extensive tumor involvement of the airway with airway stenosis suspect endobronchial involvement  · S/p left thoracentesis -1.5L of bloody fluid  5/26/2020  · Possible left lower lobe chronic nonocclusive pulmonary embolus versus tumor infiltration 5/4/2020  · Possible postobstructive pneumonia/Atelectasis   · Acute Left upper arm DVT, subclavian vein  · Acute Left left lower extremity DVT, popliteal vein  · Probable obstructive sleep apnea/Obesity  · Acute renal insufficiency    Recommendations:  · Oxygen by nasal cannula  · Incentive spirometry every hour while awake  · Home O2 evaluation prior to discharge  · BiPAP PRN  · Discontinue albuterol and Ipratropium Q 4 hours and prn  · Xopenex 1.25 every 4 hours as needed  · Symbicort 160  · Continue pigtail catheter under Pleur-evac; consider removing if output remains minimal since no significant expansion on follow-up x-ray  · Repeat potassium and check BNP; consider nephrology consult  · Decadron 4 mg PO every 8 hours  · Continue IV Zosyn  · X-ray chest in the morning  · Chemotherapy per oncology  · Monitor hemoptysis, monitor H&H  · Eliquis on hold  · Bedside physical therapy  · Discussed with RN   · Rehab discharge planning; remove chest tube prior to discharge to rehab  · Palliative  Care / address code status  · DVT prophylaxis        Srinivas Morocho MD on 5/31/2020 at 1:32 PM  Pulmonary Critical Care and Sleep Medicine,  [facility] AT [place]: [phone]

## 2020-06-01 PROBLEM — R53.0 NEOPLASTIC MALIGNANT RELATED FATIGUE: Status: ACTIVE | Noted: 2020-01-01

## 2020-06-01 PROBLEM — R53.81 PHYSICAL DEBILITY: Status: ACTIVE | Noted: 2020-01-01

## 2020-06-01 NOTE — PROGRESS NOTES
Patient Name: Prince Kapoor  Date of admission: 5/23/2020  7:34 AM  Patient's age: 77 y.o., 1945  Admission Dx: KRISTINA (acute kidney injury) ([facility] [address] [N17.9]    Reason for Consult: management recommendations  Requesting Physician: Annita Wagoner DO    CHIEF COMPLAINT:  Lung cancer    SUBJECTIVE:  . The patient was seen and examined. Started on chemotherapy 5/29/2020. No N/V. No fever. No headache. Continues to have SOB. He has increasing shortness of breath over the last few hours with lung congestion. No hemoptysis. BRIEF CASE HISTORY:    The patient is a 77 y.o.  male who is admitted to the hospital for acute respiratory failure. And progressive hemoptysis  Pt is known to us with small cell lung cancer, stage 4. We plan to start chemo int he next few days   He has afib on anticoagulation. Pt is seen and examined, + hemoptysis. INR elevated, likely due to DOAC. 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 Past Surgical History:   has a past surgical history that includes Tunneled venous port placement (05/19/2020). Medications:    Reviewed in Epic     Allergies:  Patient has no known allergies. Social History:   reports that he quit smoking about 4 weeks ago. His smoking use included cigars and cigarettes. He has a 50.00 pack-year smoking history. He has never used smokeless tobacco. He reports previous alcohol use. He reports that he does not use drugs. Family History: family history includes Heart Disease in his father and mother; Hemochromatosis in his sister.     REVIEW OF SYSTEMS:    Constitutional: No fever or chills. No night sweats, +weight loss and fatigue   Eyes: No eye discharge, double vision, or eye pain   HEENT: negative for sore mouth, sore throat, hoarseness and voice change   Respiratory: dyspnea, chest discomfort and hemoptysis   Cardiovascular: negative for chest pain, dyspnea, palpitations, orthopnea, PND   Gastrointestinal: negative for nausea, vomiting, diarrhea, constipation, abdominal pain, Dysphagia, hematemesis and hematochezia   Genitourinary: negative for frequency, dysuria, nocturia, urinary incontinence, and hematuria   Integument: negative for rash, skin lesions, bruises. Hematologic/Lymphatic: negative for easy bruising, bleeding, lymphadenopathy, or petechiae   Endocrine: negative for heat or cold intolerance,weight changes, change in bowel habits and hair loss   Musculoskeletal: negative for myalgias, arthralgias, pain, joint swelling,and bone pain   Neurological: negative for headaches, dizziness, seizures, weakness, numbness    PHYSICAL EXAM:      BP (!) 131/93   Pulse 120   Temp 97 °F (36.1 °C) (Temporal)   Resp 20   Ht 6' 2\" (1.88 m)   Wt (!) 304 lb 8 oz (138.1 kg)   SpO2 95%   BMI 39.10 kg/m²    Temp (24hrs), Av.5 °F (36.4 °C), Min:96.9 °F (36.1 °C), Max:98.8 °F (37.1 °C)    General appearance - ill appearing elderly man, hemoptysis has subsided.    Mental status - alert and cooperative   Eyes - pupils equal and reactive, extraocular eye movements intact   Ears - bilateral TM's and external ear canals normal   Mouth - mucous membranes moist, pharynx normal without lesions   Neck - supple, no significant adenopathy   Lymphatics - no palpable lymphadenopathy, no hepatosplenomegaly   Chest - poor air entry , no wheezing   Heart - irregular, afib   Abdomen - soft, nontender, nondistended, no masses or organomegaly   Neurological - alert, oriented, normal speech, no focal findings or movement disorder noted   Musculoskeletal - no joint tenderness, deformity or swelling

## 2020-06-01 NOTE — PROGRESS NOTES
Occupational Therapy  Facility/Department: [facility]  Daily Treatment Note  NAME: Alise Watson  : 1945  MRN: [id number]    Date of Service: 2020    Discharge Recommendations:  [address]   Patient would benefit from SNF for continued occupational therapy to increase independence with  ADL of bathing, dressing, toileting and grooming. Writer recommending SNF placement for for activity tolerance and strength which will increase independence with ADL's coordinated with bed mobility and chair transfers. Continued skilled OT services to address decreased safety awareness with ADL and IADL tasks and for education and increased independence with DME and AE for fall prevention and ec/ws techniques prior to d/c home. Assessment   Performance deficits / Impairments: Decreased functional mobility ; Decreased endurance;Decreased safe awareness;Decreased ADL status; Decreased balance;Decreased high-level IADLs;Decreased posture;Decreased coordination  Assessment: Skilled OT is indicated to increase I and safety awareness with functional performance to return home with assist as needed. Prognosis: Fair  REQUIRES OT FOLLOW UP: Yes  Activity Tolerance  Activity Tolerance: Patient limited by fatigue;Treatment limited secondary to medical complications   Activity Tolerance: Poor  Safety Devices  Safety Devices in place: Yes  Type of devices: Patient at risk for falls; Bed alarm in place; Left in bed;Call light within reach;Gait belt         Patient Diagnosis(es): The primary encounter diagnosis was Malignant neoplasm of left lung, unspecified part of lung ([facility] [address] 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 has a past surgical history that includes Tunneled venous port placement (05/19/2020). Restrictions  Restrictions/Precautions  Restrictions/Precautions: Fall Risk, Up as Tolerated  Required Braces or Orthoses?: No  Position Activity Restriction  Other position/activity restrictions: 3 L O2 per NC, up with assist, RUE IV   Subjective   General  Chart Reviewed: Yes  Patient assessed for rehabilitation services?: Yes  Response to previous treatment: Patient with no complaints from previous session  Family / Caregiver Present: No      Orientation  Orientation  Overall Orientation Status: Within Functional Limits  Objective             Balance  Sitting Balance: Stand by assistance(on EOB)  Standing Balance: Maximum assistance(x2 in crescencio chuck)  Functional Mobility  Functional Mobility Comments: UNABLE  Bed mobility  Supine to Sit: Moderate assistance;Maximum assistance;2 Person assistance  Sit to Supine: Moderate assistance;Maximum assistance;2 Person assistance  Scooting: Maximal assistance;2 Person assistance  Transfers  Sit to stand: Maximum assistance;Dependent/Total;2 Person assistance  Stand to sit: Maximum assistance;Dependent/Total;2 Person assistance  Transfer Comments: using crescencio stedy                       Cognition  Overall Cognitive Status: Exceptions  Arousal/Alertness: Appropriate responses to stimuli  Following Commands: Follows one step commands with repetition; Follows one step commands with increased time  Attention Span: Appears intact  Memory: Decreased short term memory  Safety Judgement: Decreased awareness of need for safety;Decreased awareness of need for assistance  Problem Solving: Assistance required to identify errors made;Assistance

## 2020-06-01 NOTE — PROGRESS NOTES
emergency room showed lactic acidosis 4.3, BUN 44, creatinine 1.34. Patient had normal kidney function at discharge. He also had leukocytosis 16.7. Patient is currently on dexamethasone. CT chest was repeated and showed interval progression of lung mass.      Patient is awaiting chemotherapy. He had port placed prior to the hospitalization. 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     - Holding anticoagulation   - To undergo IR guided thora on Tuesday. - KRISTINA has resolved  - Long discussion about holding AC in light of DVT vs continuing with hemoptysis  - Lactic acid continues to uptrend  - Chest tube placed on 5/28 with removal of 1.7 L of old blood. - Started chemo on 5/29 - scheduled for Friday/Saturday/Sunday  - Attempting to get patient to SNF  - The patient is deteriorating quite rapidly. I am concerned that he is not a candidate for chemotherapy. Review of Systems:     Constitutional: Reports profound fatigue and weakness, no appetite; negative for chills, fevers, sweats  Respiratory: Positive for shortness of breath, which persists and is worsening, positive for coughing. No hemoptysis overnight. negative for wheezing  Cardiovascular:  negative for chest pain, chest pressure/discomfort, lower extremity edema, palpitations  Gastrointestinal:  negative for abdominal pain, constipation, diarrhea, nausea, vomiting  MSK: reports left upper arm swelling, now with weeping today  Neurological:  negative for dizziness, headache    Medications:      Allergies:  No Known Allergies    Current Meds:   Scheduled Meds:    albuterol  10 mg Nebulization Once    dexamethasone  8 mg Intravenous Daily    dronabinol  2.5 mg Oral BID    guaiFENesin  600 mg Oral BID    aspirin  81 mg Oral Daily    atorvastatin  40 mg Oral Nightly    budesonide-formoterol  2 puff Inhalation BID    dexamethasone  4 mg Oral Q8H    dilTIAZem  240 mg Oral Daily    [Held by provider] 05/29/20  1151 05/30/20  0426 05/31/20  0433 06/01/20  0430   WBC  --  18.4* 21.8* 19.0*   RBC  --  3.15* 3.30* 3.33*   HGB  --  9.3* 9.8* 9.9*   HCT  --  30.6* 32.3* 32.6*   MCV  --  97.1 97.9 97.9   MCH  --  29.5 29.7 29.7   MCHC  --  30.4 30.3 30.4   RDW  --  16.2* 16.2* 16.4*   PLT  --  216 234 209   MPV  --  11.6 11.4 11.8   INR 1.3  --   --   --      Chemistry:  Recent Labs     05/30/20 0426 05/31/20 0433 05/31/20  1512 06/01/20  0430    136  --  134*   K 5.6* 5.8* 5.7* 5.8*   CL 97* 94*  --  95*   CO2 25 23  --  23   GLUCOSE 241* 240*  --  259*   BUN 48* 62*  --  76*   CREATININE 0.91 1.01  --  1.03   ANIONGAP 15 19*  --  16   LABGLOM >60 >60  --  >60   GFRAA >60 >60  --  >60   CALCIUM 9.3 9.6  --  9.3   PROBNP  --   --   --  4,724*     No results for input(s): PROT, LABALBU, LABA1C, T9YRYKC, I5ABMIG, FT4, TSH, AST, ALT, LDH, GGT, ALKPHOS, LABGGT, BILITOT, BILIDIR, AMMONIA, AMYLASE, LIPASE, LACTATE, CHOL, HDL, LDLCHOLESTEROL, CHOLHDLRATIO, TRIG, VLDL, OZL26RX, PHENYTOIN, PHENYF, URICACID, POCGLU in the last 72 hours. ABG:No results found for: POCPH, PHART, PH, POCPCO2, GJL4KML, PCO2, POCPO2, PO2ART, PO2, POCHCO3, KPH3IRV, HCO3, NBEA, PBEA, BEART, BE, THGBART, THB, IOK3DIQ, BUXU1WNH, V2APLEMZ, O2SAT, FIO2  Lab Results   Component Value Date/Time    SPECIAL NOT REPORTED 05/26/2020 12:40 PM    SPECIAL NOT REPORTED 05/26/2020 12:40 PM     Lab Results   Component Value Date/Time    CULTURE NO GROWTH 6 DAYS 05/26/2020 12:40 PM    CULTURE PENDING 05/26/2020 12:40 PM       Radiology:  Xr Chest Portable    Result Date: 5/23/2020  Overall, is been no significant change in chest findings compared to the May 21, 2020 study. Near complete whiteout of the left hemithorax is seen likely related to obstruction/lung atelectasis by the patient's known lung cancer.      Xr Chest Portable    Result Date: 5/21/2020  No significant change in the near complete opacification of the left hemithorax reflecting mass via NC); wean as tolerated  11. Incentive spirometry, Acapella  12. Lactic acidosis likely related to bleeding and malignancy - stable at 5.4 today   13. Hold parameters on anti-hypertensives  14. Maintain off of IV fluids and antibiotics. 15. Continue bladder scans/PRN straight caths, strict I/Os  16. Avoid nephrotoxins  17. Ensure TID and with snacks, appreciate dietary recs  18. Prognosis is poor  19. Disposition: Mr. naidu has received 3 doses of chemotherapy over the weekend. However, he continues to deteriorate. He has become profoundly weak, and cannot get up to chair at bedside. Remains tachycardic and short of breath. Restarting Tikosyn today. EKGs to follow. Will ask palliative care to assist with goals of care.     Bhavik Alfredo, DO  6/1/2020  11:02 AM

## 2020-06-01 NOTE — PROGRESS NOTES
S4,   ABDOMEN: Soft, nontender, nondistended. Bowel sounds present. SKIN: Warm and dry. EXTREMITIES: 3+ upper and lower lower extremity edema. Motor movement grossly intact. No cyanosis or clubbing. Current Inpatient Medications:   albuterol  10 mg Nebulization Once    dronabinol  2.5 mg Oral BID    guaiFENesin  600 mg Oral BID    aspirin  81 mg Oral Daily    atorvastatin  40 mg Oral Nightly    budesonide-formoterol  2 puff Inhalation BID    dexamethasone  4 mg Oral Q8H    dilTIAZem  240 mg Oral Daily    dofetilide  500 mcg Oral 2 times per day    metoprolol succinate  100 mg Oral Daily    pantoprazole  40 mg Oral QAM AC    sodium chloride flush  10 mL Intravenous 2 times per day       IV Infusions (if any):   dextrose      sodium chloride 10 mL/hr at 05/31/20 1649       Diagnostics:   EKG: I reviewed the EKG for concern and that showed atrial flutter with 2-1 block versus atrial tachycardia. The QTC is not accurately measured. Crystal Montero ECHO: .   Ejection fraction: %  Stress Test: not obtained. Cardiac Angiography: not obtained. Labs:   CBC:   Recent Labs     05/31/20  0433 06/01/20  0430   WBC 21.8* 19.0*   HGB 9.8* 9.9*   HCT 32.3* 32.6*    209     BMP:   Recent Labs     05/31/20  0433 05/31/20  1512 06/01/20  0430     --  134*   K 5.8* 5.7* 5.8*   CO2 23  --  23   BUN 62*  --  76*   CREATININE 1.01  --  1.03   LABGLOM >60  --  >60   GLUCOSE 240*  --  259*     No results found for: BNP  PT/INR: No results for input(s): PROTIME, INR in the last 72 hours. APTT:No results for input(s): APTT in the last 72 hours. CARDIAC ENZYMES:No results for input(s): CKTOTAL, CKMB, CKMBINDEX, TROPONINT in the last 72 hours. FASTING LIPID PANEL:  Lab Results   Component Value Date    HDL 36 05/05/2020    TRIG 108 05/05/2020     LIVER PROFILE:No results for input(s): AST, ALT, LABALBU in the last 72 hours. ASSESSMENT:  1. Paroxysmal atrial fibrillation  2.  Left upper extremity deep vein with patient and nursing.     Jane Cano MD

## 2020-06-01 NOTE — PROGRESS NOTES
Tikosyn dose restarted per Dr. Randall Casiano. 1 hour post tikosyn dose 12 lead ekg completed. Dr. Randall Casiano notified of SVT , , QT/QTc 364/563. Orders received to update Dr. Colt Ruelas, who was perfect served with no new orders at this time.

## 2020-06-02 NOTE — PROGRESS NOTES
Section of Cardiology  Progress Note      Date:  6/2/2020  Patient: Ivan Marie  Admission:  5/23/2020  7:34 AM  Admit DX: KRISTINA (acute kidney injury) ([facility] [N17.9]  Age:  77 y.o., 1945     LOS: 10 days     Reason for evaluation:   atrial fibrillation      SUBJECTIVE:     The patient was seen and examined. Notes and labs reviewed. There were not complications over night. Overnight the heart rate dropped by 10 points only. He is currently at 130 bpm, he still denies palpitations. He is less dyspneic comparing to yesterday. Patient's cardiac review of systems: negative for chest pain. The patient is generally feeling unchanged. OBJECTIVE:    Telemetry: Atrial tachycardia versus atrial flutter  BP (!) 124/56   Pulse 136   Temp 97.3 °F (36.3 °C) (Temporal)   Resp 18   Ht 6' 2\" (1.88 m)   Wt (!) 304 lb 11.2 oz (138.2 kg)   SpO2 90%   BMI 39.12 kg/m²     Intake/Output Summary (Last 24 hours) at 6/2/2020 1727  Last data filed at 6/2/2020 4761  Gross per 24 hour   Intake 620 ml   Output 655 ml   Net -35 ml       EXAM:   CONSTITUTIONAL:  awake, alert, cooperative, no apparent distress, and appears stated age. HEENT: Normal jugular venous pulsations, no carotid bruits. Head is atraumatic, normocephalic. Eyes and oral mucosa are normal.  LUNGS: Good respiratory effort. No for increased work of breathing. On auscultation: diminished breath sounds bilaterally  CARDIOVASCULAR:  Normal apical impulse, regular rate and rhythm, normal S1 and S2, no S3 or S4,   ABDOMEN: Soft, nontender, nondistended. Bowel sounds present. SKIN: Warm and dry. EXTREMITIES: 2+ lower extremity edema. Motor movement grossly intact. No cyanosis or clubbing.     Current Inpatient Medications:   albuterol  10 mg Nebulization Once    dronabinol  2.5 mg Oral BID    guaiFENesin  600 mg Oral BID    aspirin  81 mg Oral Daily    atorvastatin  40 mg Oral Nightly    budesonide-formoterol  2 puff Inhalation BID    dexamethasone

## 2020-06-02 NOTE — PROGRESS NOTES
chronic nonocclusive PE vs tumor infiltration 5/4/2020  · Possible postobstructive pneumonia/Atelectasis   · Acute Left upper arm DVT, subclavian vein  · Acute Left left lower extremity DVT, popliteal vein  · Probable obstructive sleep apnea/Obesity  · KRISTINA/Hyperkalemia     Recommendations:  · Oxygen by nasal cannula  · BiPAP as needed  · Completed IV Zosyn  · Xopenex 1.25 Q 4 hours prn  · Symbicort 160  · Decadron 4mg PO every 8 hours   · Oncology following, started on Chemo   · PT/OT  · Eliquis on hold  · Encourage p.o. intake  · Incentive spirometry every hour while awake  · Palliative care on consult   · DNR CCA, no intubation   · Discharge planning   · Overall prognosis is poor  · Will follow with you    Electronically signed by     LEROY Banda CNP on 6/2/2020 at 10:31 AM  Patient was seen under the supervision of Dr. Russell Ferris and Sleep Medicine,    Patient seen and evaluated by me. He continues to have shortness of breath, no cough. Lung exam reveals decreased air exchange on the left. His pigtail catheter was removed yesterday. Recommend discontinuing IV fluids, continue to monitor creatinine. Continue IV Zosyn, bronchodilators, Decadron. Above was reviewed and discussed with Jasmine Renee CNP. And I agree with assessment and plan of care.   Electronically signed by     Preston Stubbs MD on 6/2/2020 at 11:46 AM  Pulmonary Critical Care and Sleep Medicine,  [facility]  Cell: [phone]  Office: [phone]

## 2020-06-02 NOTE — PROGRESS NOTES
Occupational Therapy  Facility/Department: [facility]  Daily Treatment Note  NAME: Shelly Barth  : 1945  MRN: [id number]    Date of Service: 2020    Discharge Recommendations:  [address]   Patient would benefit from SNF for continued occupational therapy to increase independence with  ADL of bathing, dressing, toileting and grooming. Writer recommending SNF placement for for activity tolerance and strength which will increase independence with ADL's coordinated with bed mobility and chair transfers. Continued skilled OT services to address decreased safety awareness with ADL and IADL tasks and for education and increased independence with DME and AE for fall prevention and ec/ws techniques prior to d/c home. Assessment   Performance deficits / Impairments: Decreased functional mobility ; Decreased endurance;Decreased safe awareness;Decreased ADL status; Decreased balance;Decreased high-level IADLs;Decreased posture;Decreased coordination  Assessment: Skilled OT is indicated to increase I and safety awareness with functional performance to return home with assist as needed. Prognosis: Fair  REQUIRES OT FOLLOW UP: Yes  Activity Tolerance  Activity Tolerance: Patient limited by fatigue;Treatment limited secondary to medical complications   Activity Tolerance: Poor  Safety Devices  Safety Devices in place: Yes  Type of devices: Patient at risk for falls; Left in bed;Bed alarm in place;Call light within reach;Nurse notified;Gait belt         Patient Diagnosis(es): The primary encounter diagnosis was Malignant neoplasm of left lung, unspecified part of lung ([facility] 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       has a past medical history of Atrial fibrillation with RVR ([address] Cancer ([address] COPD (chronic

## 2020-06-02 NOTE — PROGRESS NOTES
Physical Therapy  Facility/Department: [facility]  Daily Treatment Note  NAME: Pernell Schwarz  : 1945  MRN: [id number]    Date of Service: 2020    Discharge Recommendations:  Subacute/Skilled Nursing Facility        Assessment   Body structures, Functions, Activity limitations: Decreased functional mobility ; Decreased ADL status; Decreased strength;Decreased endurance;Decreased balance;Decreased posture  Assessment:  Patient with a decline in strength and  transfers requiring maximal assistance / dependent  of 2 or 3 for all mobility. Pt also presents with increased limitations in endurance as he struggles to vocalize d/t SOB. Extended time and rest breaks  required for all mobility. Prognosis: Good  Decision Making: High Complexity  REQUIRES PT FOLLOW UP: Yes  Activity Tolerance  Activity Tolerance: Patient limited by endurance; Patient limited by fatigue;Patient limited by pain     Patient Diagnosis(es): The primary encounter diagnosis was Malignant neoplasm of left lung, unspecified part of lung ([facility] 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 has a past surgical history that includes Tunneled venous port placement (2020).     Restrictions  Restrictions/Precautions  Restrictions/Precautions: Fall Risk, Up as Tolerated  Required Braces or Orthoses?: No  Position Activity Restriction  Other position/activity restrictions: 3 L O2 per NC, up with assist, RUE IV Training, Transfer Training, Home Exercise Program, Safety Education & Training, Patient/Caregiver Education & Training, Stair training, Gait Training  Safety Devices  Type of devices: Nurse notified, Left in bed, Gait belt, Call light within reach, Bed alarm in place, All fall risk precautions in place     Therapy Time   Individual Concurrent Group Co-treatment   Time In         0829   Time Out         0858   Minutes       [address] [place] [address]

## 2020-06-02 NOTE — CARE COORDINATION
Social Work-Contacted patient's nurse. Pleurx cath was dc. Phone call to [facility]. They are able to admit patient, if he has no Pleux Cath or chest tube.  William Syed
Social work: Spoke with [facility], they [address] [facility] accept patient if dc'd with pleurx catheter. Regarding chemo, patient will get 3 rounds here before dc'ing to facility- next round in another 3 weeks. Spoke with wife, she gave more SNF choices(if pt goes with chest tube): [facility]. [facility](wife hesistant d/t distance). If chest tube is dc'd [facility] can accept. SW also faxed referral to [facility]. UPDATE: Call backs from [facility], - SNF's can accommodate pleurx if it drains to bottle and will need MD order for how often to drain. Chest tube is ordered for continuous suction here, which snf's cannot do. Await response from [facility] and [facility].
n/a    Expected Discharge date:  05/25/20  Follow Up Appointment: Best Day/ Time:      Transportation provider: wife  Transportation arrangements needed for discharge: No    Discharge Plan:   Met with patient to review plan of care. He lives with his wife. New DX Lung Cancer May 2020, port placed and first chemo scheduled for 5-26. Pt developed LUE DVT after port placement and was started on Eliquis May 2020. Pt states he is taking the med correctly. Pt is a readmit due to hemoptysis  He was home less than 24 hrs and [facility] was not able to open him. Jessica at [facility] informed of admit. Phone call to wife and she states she feels pt is taking his meds correctly but does he admit he sometimes misses a day. She would like to keep referral in place for [facility]. PLAN  Referral to [facility]. Home Med Eliquis- $50 a month. Pt's wife states she has refilled 1 month so far.  ?  Need for home O2 eval.  Pox 95 with 2L        Electronically signed by Darling Fischer on 5/24/20 at 4:23 PM EDT

## 2020-06-02 NOTE — PROGRESS NOTES
active bowel sounds; Edema:  +1 generalized, +2 RUE (non-pitting), +3 LUE/RLE (pitting), +4 LLE (pitting);  Skin:  pale/ecchymosis, bruising, BUE/(R) chest port site, redness/tear, coccyx, incision 5/19 chest (R) upper (surgical)  · Wound Type: Multiple, Skin Tears, Surgical Wound  · Current Nutrition Therapies:  · Oral Diet Orders: General   · Oral Diet intake: 1-25%  · Oral Nutrition Supplement (ONS) Orders: Renal Oral Supplement  · ONS intake: %  · Anthropometric Measures:  · Ht: 6' 2\" (188 cm)   · Current Body Wt: 304 lb 10.8 oz (138.2 kg)  · Admission Body Wt: 288 lb (130.6 kg)(stated)  · Usual Body Wt: 304 lb 0.2 oz (137.9 kg)(1 month ago)  · % Weight Change:  6.6 kg (4.8%) weight loss in one month  · Ideal Body Wt: 190 lb (86.2 kg), % Ideal Body 152%  · BMI Classification: BMI 35.0 - 39.9 Obese Class II    Nutrition Interventions:   Modify current diet, Continue current ONS  Continued Inpatient Monitoring    Nutrition Evaluation:   · Evaluation: No progress toward goals   · Goals: PO intakes to meet >75% energy and protein needs    · Monitoring: Meal Intake, Supplement Intake, Diet Tolerance, Skin Integrity, Wound Healing, Weight, Pertinent Labs, Chewing/Swallowing, Constipation, Monitor Bowel Function      Electronically signed by Jessica Desir, JESSICA, JERAMY on 6/2/20 at 4:54 PM EDT    Contact Number: [phone]

## 2020-06-02 NOTE — PROGRESS NOTES
[facility]    Progress Note    6/2/2020    1:24 PM    Name:   Ivan Marie  MRN:     [id number]     Daniella Dos Santos:      [de-identified]   Room:   2042/2042-01  IP Day:  8  Admit Date:  5/23/2020  7:34 AM    PCP:   Denia Walton MD  Code Status:  DNR-CCA    Subjective:     C/C:   Chief Complaint   Patient presents with    Shortness of Breath     RECENT DIAGNOSIS lUNG CANCER     Interval History Status: improved. Feels a little better today  Denies cp/n/v  Less sob    Poor historian    Brief History:     Per my partner: \"The patient is a 68 y. o.  Non-/non  male who presents with Shortness of Breath (RECENT DIAGNOSIS LUNG CANCER) and he is admitted to the hospital for the management of  KRISTINA (acute kidney injury).     Patient was brought to emergency room by ambulance after he had hemoptysis.  Patient also complained of difficulty in breathing.  He otherwise had no changes.  He was recently admitted in the hospital for A. fib RVR, pneumonia.  Patient was discharged on [facility] also was having worsening left upper extremity swelling and was found to have left subclavian DVT.  Anticoagulation was changed to high-dose Eliquis for 7 days,  breathing improved with treatment and he was evaluated for home O2 and not qualified at that time. Davin Sher was discharged home yesterday on 5/22/2020 and return back today.  Initial evaluation emergency room showed lactic acidosis 4.3, BUN 44, creatinine 1.34.  Patient had normal kidney function at discharge. [facility] also had leukocytosis 16.7.  Patient is currently on dexamethasone. CT chest was repeated and showed interval progression of lung mass.      Patient is awaiting chemotherapy. [facility] had port placed prior to the hospitalization. [facility] has small cell lung cancer left upper lobe with metastasis to lymph nodes, brain.      - Holding anticoagulation   - To undergo IR guided thora on Tuesday.   - KRISTNIA has resolved  - Long discussion about holding AC in light of DVT vs continuing with hemoptysis  - Lactic acid continues to uptrend  - Chest tube placed on 5/28 with removal of 1.7 L of old blood. - Started chemo on 5/29 - scheduled for Friday/Saturday/Sunday  - Attempting to get patient to SNF\"    Review of Systems:     Constitutional:  negative for chills, fevers, sweats  Respiratory:  negative for wheezing  Cardiovascular:  negative for chest pain, chest pressure/discomfort, palpitations  Gastrointestinal:  negative for abdominal pain, constipation, diarrhea, nausea, vomiting  Neurological:  negative for dizziness, headache    Medications:      Allergies:  No Known Allergies    Current Meds:   Scheduled Meds:    dronabinol  2.5 mg Oral BID    guaiFENesin  600 mg Oral BID    aspirin  81 mg Oral Daily    atorvastatin  40 mg Oral Nightly    budesonide-formoterol  2 puff Inhalation BID    dexamethasone  4 mg Oral Q8H    dilTIAZem  240 mg Oral Daily    dofetilide  500 mcg Oral 2 times per day    metoprolol succinate  100 mg Oral Daily    pantoprazole  40 mg Oral QAM AC    sodium chloride flush  10 mL Intravenous 2 times per day     Continuous Infusions:    dextrose      sodium chloride 100 mL/hr at 06/02/20 0918     PRN Meds: levalbuterol, sodium chloride nebulizer, glucose, dextrose, glucagon (rDNA), dextrose, traMADol, acetaminophen, benzonatate, sodium chloride flush, potassium chloride **OR** potassium alternative oral replacement **OR** potassium chloride, magnesium sulfate, polyethylene glycol, promethazine **OR** ondansetron, nicotine    Data:     Past Medical History:   has a past medical history of Atrial fibrillation with RVR ([facility] Cancer ([facility] COPD (chronic obstructive pulmonary disease) ([facility] Hx of blood clots, Hyperlipidemia, Hypertension, IGT (impaired glucose tolerance), Kidney stones, Lung mass, 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 ([facility] 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, splenomegaly  Extremities:  no redness, tenderness in the calves; 1+ ble edema; 3+ LUE edema  Skin:  no gross lesions, rashes, induration    Assessment:        Hospital Problems           Last Modified POA    * (Principal) Acute respiratory failure with hypoxia ([address] 5/26/2020 Yes    Supratherapeutic INR 5/24/2020 Yes    Recurrent left pleural effusion 5/28/2020 Yes    Lactic acidosis 5/25/2020 Yes    Acute deep vein thrombosis (DVT) of left upper extremity ([address] 5/26/2020 Yes    Acute deep vein thrombosis (DVT) of popliteal vein of left lower extremity ([address] 5/27/2020 Clinically Undetermined    Sinus tachycardia 5/31/2020 Yes    Physical debility 6/1/2020 Yes    Neoplastic malignant related fatigue 6/1/2020 Yes    Obesity (BMI 30-39. 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 Dc ivf  2. Monitor renal function  3. Monitor/control HR  4.  Poor prognosis    Monetta  Blood, DO  6/2/2020  1:24 PM

## 2020-06-03 NOTE — PROGRESS NOTES
fever or chills. No night sweats, +weight loss and fatigue   Eyes: No eye discharge, double vision, or eye pain   HEENT: negative for sore mouth, sore throat, hoarseness and voice change   Respiratory: dyspnea, chest discomfort and hemoptysis   Cardiovascular: negative for chest pain, dyspnea, palpitations, orthopnea, PND   Gastrointestinal: negative for nausea, vomiting, diarrhea, constipation, abdominal pain, Dysphagia, hematemesis and hematochezia   Genitourinary: negative for frequency, dysuria, nocturia, urinary incontinence, and hematuria   Integument: negative for rash, skin lesions, bruises. Hematologic/Lymphatic: negative for easy bruising, bleeding, lymphadenopathy, or petechiae   Endocrine: negative for heat or cold intolerance,weight changes, change in bowel habits and hair loss   Musculoskeletal: negative for myalgias, arthralgias, pain, joint swelling,and bone pain   Neurological: negative for headaches, dizziness, seizures, weakness, numbness    PHYSICAL EXAM:      BP (!) 90/51   Pulse 125   Temp 97.5 °F (36.4 °C) (Temporal)   Resp 20   Ht 6' 2\" (1.88 m)   Wt (!) 304 lb 11.2 oz (138.2 kg)   SpO2 91%   BMI 39.12 kg/m²    Temp (24hrs), Av.3 °F (36.3 °C), Min:96.8 °F (36 °C), Max:97.6 °F (36.4 °C)    General appearance - ill appearing elderly man, hemoptysis has subsided.    Mental status - alert and cooperative   Eyes - pupils equal and reactive, extraocular eye movements intact   Ears - bilateral TM's and external ear canals normal   Mouth - mucous membranes moist, pharynx normal without lesions   Neck - supple, no significant adenopathy   Lymphatics - no palpable lymphadenopathy, no hepatosplenomegaly   Chest - poor air entry , no wheezing   Heart - irregular, afib   Abdomen - soft, nontender, nondistended, no masses or organomegaly   Neurological - alert, oriented, normal speech, no focal findings or movement disorder noted   Musculoskeletal - no joint tenderness, deformity or swelling atrial fibrillation) ([facility] [I48.0]     Tobacco abuse [Z72.0] 03/21/2013    IGT (impaired glucose tolerance) [R73.02] 09/20/2012    Essential hypertension [I10]          IMPRESSION:   1. Acute resp failure  2. Metastatic small cell lung cancer  3. hemoptysis due to tumor, stopped after stopping anticoagulation  4. afib   5. Elevated INR due to DOAC improving   6. Upper arm DVT causing severe swelling. 7. KRISTINA    RECOMMENDATIONS:  1. S/P Pleurx catheter. 2. Started chemo 5/29/2020. Completed the first cycle. Tolerated well so far. 3. Currently he is having lung congestion likely related to CHF. 4. Next chemotherapy cycle will be in 3 weeks. 5. Monitor side effects. Monitor labs. 6. Continue off anticoagulation for now, hemoglobin is stable                              Los Sharp MD                          [facility]                          Cell: [phone]    This note is created with the assistance of a speech recognition program.  While intending to generate a document that actually reflects the content of the visit, the document can still have some errors including those of syntax and sound a like substitutions which may escape proof reading. It such instances, actual meaning can be extrapolated by contextual diversion.

## 2020-06-03 NOTE — DISCHARGE SUMMARY
placed on 5/28 with removal of 1.7 L of old blood.   - Started chemo on 5/29 - scheduled for Friday/Saturday/Sunday  - Attempting to get patient to [facility]    Significant Diagnostic Studies:   Labs / Micro:  CBC:   Lab Results   Component Value Date    WBC 21.3 06/02/2020    RBC 3.06 06/02/2020    RBC 5.30 03/14/2012    HGB 9.2 06/02/2020    HCT 29.5 06/02/2020    MCV 96.4 06/02/2020    MCH 30.1 06/02/2020    MCHC 31.2 06/02/2020    RDW 16.1 06/02/2020     06/02/2020     03/14/2012     BMP:    Lab Results   Component Value Date    GLUCOSE 348 06/02/2020    GLUCOSE 112 03/14/2012     06/02/2020    K 5.3 06/02/2020    CL 93 06/02/2020    CO2 23 06/02/2020    ANIONGAP 18 06/02/2020     06/02/2020    CREATININE 1.63 06/02/2020    BUNCRER 69 06/02/2020    CALCIUM 8.8 06/02/2020    LABGLOM 42 06/02/2020    GFRAA 50 06/02/2020    GFR      06/02/2020    GFR NOT REPORTED 06/02/2020     HFP:    Lab Results   Component Value Date    PROT 5.8 05/29/2020     CMP:    Lab Results   Component Value Date    GLUCOSE 348 06/02/2020    GLUCOSE 112 03/14/2012     06/02/2020    K 5.3 06/02/2020    CL 93 06/02/2020    CO2 23 06/02/2020     06/02/2020    CREATININE 1.63 06/02/2020    ANIONGAP 18 06/02/2020    ALKPHOS 55 05/29/2020    ALT 25 05/29/2020    AST 47 05/29/2020    BILITOT 0.47 05/29/2020    LABALBU 2.8 05/29/2020    LABALBU 4.2 03/14/2012    ALBUMIN NOT REPORTED 05/29/2020    LABGLOM 42 06/02/2020    GFRAA 50 06/02/2020    GFR      06/02/2020    GFR NOT REPORTED 06/02/2020    PROT 5.8 05/29/2020    CALCIUM 8.8 06/02/2020     PT/INR:    Lab Results   Component Value Date    PROTIME 15.9 05/29/2020    INR 1.3 05/29/2020     PTT:   Lab Results   Component Value Date    APTT 28.2 05/26/2020     FLP:    Lab Results   Component Value Date    CHOL 132 05/05/2020    TRIG 108 05/05/2020    HDL 36 05/05/2020     U/A:  No results found for: Michaeltamie Esteves, [address], [address] [facility], [address] [facility] Elva Ruiz, UROBILINOGEN, NITRU, LEUKOCYTESUR  TSH:    Lab Results   Component Value Date    TSH 0.79 2020        Radiology:  Xr Chest (single View Frontal)    Result Date: 2020  Left-sided pleural catheter has been removed. Unchanged near complete opacification of the left hemithorax. Xr Chest (single View Frontal)    Result Date: 2020  Stable exam as described. Xr Chest (single View Frontal)    Result Date: 2020  Relatively stable exam with continued virtually complete opacification of the left hemithorax. Xr Chest Portable    Result Date: 2020  Unchanged appearance of the chest with near complete opacification of the left hemithorax again noted with findings of volume loss and pleural fluid noted. Xr Chest Portable    Result Date: 2020  Stable chest showing near complete whiteout opacification of left hemithorax sparing small portion the apex. Xr Chest Portable    Result Date: 2020  Left-sided pleural drainage catheter seen laterally in the left hemithorax. Persistent opacification of the majority of the left hemithorax with some improved aeration in the left apex     Ir Chest Tube Insertion    Result Date: 2020  Successful CT guided placement of a left chest tube, as above. Consultations:    Consults:     Final Specialist Recommendations/Findings:   IP CONSULT TO ONCOLOGY  IP CONSULT TO PULMONOLOGY  PALLIATIVE CARE EVAL      The patient was seen and examined on day of discharge and this discharge summary is in conjunction with any daily progress note from day of discharge. Discharge plan:     Disposition: .      Physician Follow Up:     Denia Walton MD  [address] [id number]  [address] [facility]  [address] [place] [address]  [address] also known as Emilia Turcios MD  [address] [facility] cream  Commonly known as:  EMLA  Apply topically as needed. metoprolol succinate 100 MG extended release tablet  Commonly known as:  TOPROL XL  TAKE 1 TABLET DAILY     omeprazole 20 MG delayed release capsule  Commonly known as:  PriLOSEC  Take 1 capsule by mouth daily     ondansetron 8 MG Tbdp disintegrating tablet  Commonly known as:  Zofran ODT  Place 1 tablet under the tongue every 8 hours as needed for Nausea or Vomiting            No discharge procedures on file. Time Spent on discharge is  35 mins in patient examination, evaluation, counseling as well as medication reconciliation, prescriptions for required medications, discharge plan and follow up. Electronically signed by   Quentin Crigler, APRN - CNP  6/3/2020  4:49 AM      Thank you Dr. Lory Byrnes MD for the opportunity to be involved in this patient's care.

## 2020-06-03 NOTE — PROGRESS NOTES
Regulo present at bedside. Family requesting that [facility] be notified. [place] to follow up. Family denies any other needs at this time.      Electronically signed by Wu Chase RN on 6/3/2020 at 3:51 AM

## 2020-06-03 NOTE — PROGRESS NOTES
RN returned to patients room in order to have the patient sign the code status form. RN found patient to be unresponsive. RT was called, pt was put on BIPAP. Patient began to respond slightly after BIPAP was started. In house NP was contacted regarding patients status. Patients wife, Urban Rater, was contacted regarding status of patient. She will be coming to visit. The sonia was contacted and will be present shortly. Cornelius Kayser, the in house NP ordered Levo to be given. Levo was started. Patient was moved to room 2031 so that BP's could be monitored. RN to continue to monitor patient closely.     Electronically signed by Santy Haile RN on 6/3/2020 at 3:42 AM

## 2020-06-03 NOTE — SIGNIFICANT EVENT
Called to bedside, patient has lack of respirations and pulse. No heart tones or breath sounds appreciated by myself and confirmed by Bradly Dent RN. No palpable pulse. Few agonal beats appreciated on telemetry. Condolences and support offered to patients wife. Time of death 04:41AM 6/3/2020.

## 2020-06-03 NOTE — SIGNIFICANT EVENT
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 Vitals:  BP (!) 92/52   Pulse 117   Temp 97.1 °F (36.2 °C) (Temporal)   Resp 28   Ht 6' 2\" (1.88 m)   Wt (!) 304 lb 11.2 oz (138.2 kg)   SpO2 90%   BMI 39.12 kg/m²   Temp (24hrs), Av.4 °F (36.3 °C), Min:97.1 °F (36.2 °C), Max:97.6 °F (36.4 °C)    Recent Labs     20  0742 20  1128 20  1642 20   POCGLU 303* 361* 337* 343*       I/O (24Hr): Intake/Output Summary (Last 24 hours) at 6/3/2020 0234  Last data filed at 2020  Gross per 24 hour   Intake 720 ml   Output 625 ml   Net 95 ml       Labs:    Hematology:  Recent Labs     20  0433 20  0430 20  0414   WBC 21.8* 19.0* 21.3*   RBC 3.30* 3.33* 3.06*   HGB 9.8* 9.9* 9.2*   HCT 32.3* 32.6* 29.5*   MCV 97.9 97.9 96.4   MCH 29.7 29.7 30.1   MCHC 30.3 30.4 31.2   RDW 16.2* 16.4* 16.1*    209 178   MPV 11.4 11.8 11.9   SEGS 95* 97* 96*   LYMPHOPCT 1* 1* 2*   MONOPCT 3 1 1*   EOSRELPCT 0* 0* 0*   BASOPCT 0 0 0     Chemistry:  Recent Labs     20  0433 20  1512 20  0430 20  0414     --  134* 134*   K 5.8* 5.7* 5.8* 5.3   CL 94*  --  95* 93*   CO2 23  --  23 23   GLUCOSE 240*  --  259* 348*   BUN 62*  --  76* 113*   CREATININE 1.01  --  1.03 1.63*   ANIONGAP 19*  --  16 18*   LABGLOM >60  --  >60 42*   GFRAA >60  --  >60 50*   CALCIUM 9.6  --  9.3 8.8   PROBNP  --   --  4,724*  --    LACTA 5.9*  --  5.4* 5.8*     No results for input(s): PROT, LABALBU, EAG, H0OBCGS, O1QEICV, FT4, TSH, CORTISOL, PROLACTIN, AST, ALT, LDH, GGT, ALKPHOS, LABGGT, BILITOT, BILIDIR, AMMONIA, AMYLASE, LIPASE, LACTATE, CHOL, HDL, LDLCHOLESTEROL, CHOLHDLRATIO, TRIG, VLDL, PHENYTOIN, PHENYF, VALPROATE in the last 72 hours.   Glucose:  Recent Labs     20  2149 20  0742 20  1128 20  1642 20  1957   POCGLU 289* 303* 361* 337* 343*         Lab Results   Component Value Date/Time    SPECIAL NOT REPORTED 05/26/2020 12:40 PM    SPECIAL NOT REPORTED 05/26/2020 12:40 PM    SPECIAL NOT REPORTED 05/26/2020 12:40 PM     Lab Results   Component Value Date/Time    CULTURE NO GROWTH 6 DAYS 05/26/2020 12:40 PM    CULTURE PENDING 05/26/2020 12:40 PM    CULTURE NO GROWTH 6 DAYS 05/26/2020 12:40 PM       No results found for: POCPH, PHART, PH, POCPCO2, FAL5ZQC, PCO2, POCPO2, PO2ART, PO2, POCHCO3, XRK9SGH, HCO3, NBEA, PBEA, BEART, BE, THGBART, THB, PBK0JGL, MZDI1PYX, D2BQJIAE, O2SAT, FIO2    Radiology:    Xr Chest (single View Frontal)    Result Date: 6/1/2020  Left-sided pleural catheter has been removed. Unchanged near complete opacification of the left hemithorax. Xr Chest (single View Frontal)    Result Date: 5/31/2020  Stable exam as described. Xr Chest (single View Frontal)    Result Date: 5/27/2020  Relatively stable exam with continued virtually complete opacification of the left hemithorax. Xr Chest Portable    Result Date: 6/2/2020  Unchanged appearance of the chest with near complete opacification of the left hemithorax again noted with findings of volume loss and pleural fluid noted. Xr Chest Portable    Result Date: 5/30/2020  Stable chest showing near complete whiteout opacification of left hemithorax sparing small portion the apex. Xr Chest Portable    Result Date: 5/28/2020  Left-sided pleural drainage catheter seen laterally in the left hemithorax. Persistent opacification of the majority of the left hemithorax with some improved aeration in the left apex     Ir Chest Tube Insertion    Result Date: 5/28/2020  Successful CT guided placement of a left chest tube, as above.        Nerissa Koo, APRN - CNP  6/3/2020  2:34 AM

## 2020-06-23 ENCOUNTER — HOSPITAL ENCOUNTER (OUTPATIENT)
Dept: INFUSION THERAPY | Facility: MEDICAL CENTER | Age: 75
End: 2020-06-23

## 2020-06-29 LAB
CULTURE: NORMAL
Lab: NORMAL
SPECIMEN DESCRIPTION: NORMAL

## 2020-07-13 LAB
CULTURE: NORMAL
DIRECT EXAM: NORMAL
Lab: NORMAL
SPECIMEN DESCRIPTION: NORMAL

## 2023-01-04 NOTE — PROGRESS NOTES
Dr. Jerica Huang and Dr. Damien Alvarado discussed case at nurses station. Plan to hold anticoagulation d/t hemoptysis. Dr. Ac Carias for patient's discharge tomorrow to allow him to have chemotherapy treatment this week. 4

## 2024-04-04 NOTE — PROGRESS NOTES
Karthik Simeon  1985  [id number]      Instructions for after your Spine Surgery    Assistance at Home  After your surgery you may need help with normal everyday activities while your body heals.  Activities such as dressing, bathing, laundry, cooking or cleaning may be difficult during recovery.   You might need transportation to and from appointments.  Before your surgery arrange for a family member or friend to help during this time.    Prepare Your Home  To avoid a fall, remove any rugs or clutter and clear all walkways.   Place a night light in hallways or bathrooms.  Go grocery shopping and fill your pantry with healthy food options to have during your recovery.   You will feel tired after surgery, prepare several meals ahead of time to be kept in the freezer.      Medication  Always take prescribed medication as directed on the bottle or by the doctor.  Do not exceed 4000mg of acetaminophen (Tylenol) per day, remembering that your pain prescription may contain acetaminophen.   Do not take anti-inflammatory medication such as Ibuprofen or naproxen (Advil, Motrin, Aleve, Naprosyn).              Pain  You may not have complete relief of your pain and/or numbness immediately after your surgery.   Nerves take time to heal and recover, often several months. Be patient with your healing and continue to be as active as you can tolerate. Set a realistic expectation about your pain, it is not realistic to set a pain goal of 0. Pain can return and may temporarily increase 2-3 days after your surgery.   If you have had a cervical (neck) surgery, pain in the back of your neck and in between your shoulder blades is very common. You should notify your doctor if the pain begins to move down into your arm or hand. Neck and arm pain may return and or increase 2-3 days after your surgery.       Activity for the next 6 weeks  Avoid; Bending, heavy Lifting (anything greater than 10 pounds,), Twisting, or Sitting  (BLT'S) for long periods of time.   Avoid all pushing and pulling activities.   Do not participate in strenuous activities.   Going up and down stairs is fine. Frequent walking is highly encouraged - gradually increasing your distance.   In all activities, use comfort as your guide.   Do not return to work until seen by your physician in clinic and approved to do so.  If you have had a surgery on your neck looking side-to-side is generally OK, avoid extreme up and down movements of your neck.    You may find it is more comfortable to sleep in a recliner/LazyBoy position or more upright for the first few days.     Driving  Resume driving approximately 2 weeks after your surgery when you are comfortable AND you are not sedated or impaired by the use of narcotics or other pain medicine such as muscle relaxants.   To ensure your safety and the safety of others please ensure that you are able to check your mirrors and surroundings before you begin driving.     Diet  Eating lighter foods can help prevent nausea.  Advance your diet as tolerated.  Do not drink alcoholic beverages, operate machinery or make important decisions for the next 24 hours after your surgery.    You may find that swallowing is somewhat uncomfortable for the first few days after surgery. Swallowing should improve after a short time:  Eat soft foods and chew your food well.  Popsicles or cool drinks may help.  Warm beverages such as tea or broth may help to sooth the soreness.  Throat lozenges may help.     Dressing/ Incision Care  Keep your dressing clean and dry.   On Post Op day number #2 (Saturday) you may remove dressing and shower.  Let water gently run over your incision - do not scrub at the site.  After you shower leave the incision open to air; do not apply new dressing.   Do not soak in a bathtub, go swimming or sit in a hot tub until your incision is completely healed AND you have been evaluated by your surgeon.   Do not apply any creams,  edema, no clubbing or cyanosis , arm swelling is worsening. Skin - normal coloration and turgor, no rashes, no suspicious skin lesions noted ,    DATA:    Labs:   CBC:   Recent Labs     05/28/20  0337 05/29/20  0253   WBC 15.0* 15.3*   HGB 9.1* 9.2*   HCT 30.3* 29.9*    226     BMP:   Recent Labs     05/27/20  0432 05/29/20  0749    136   K 5.7* 6.0*   CO2 26 23   BUN 47* 43*   CREATININE 0.78 0.76   LABGLOM >60 >60   GLUCOSE 209* 226*     PT/INR:   Recent Labs     05/27/20 0432   PROTIME 17.6*   INR 1.5       IMAGING DATA:      Primary Problem  Acute respiratory failure with hypoxia [facility])    Active Hospital Problems    Diagnosis Date Noted    Acute deep vein thrombosis (DVT) of popliteal vein of left lower extremity (HCC) [I82.432] 05/27/2020     Priority: High    Acute deep vein thrombosis (DVT) of left upper extremity (HCC) [I82.622] 05/26/2020     Priority: High    Lactic acidosis [E87.2] 05/25/2020     Priority: High    Supratherapeutic INR [R79.1] 05/24/2020     Priority: High    Recurrent left pleural effusion [J90] 05/24/2020     Priority: High    Obesity (BMI 30-39. 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 Acute resp failure  2.  Metastatic small cell lung cancer  3. hemoptysis due to tumor, stopped after stopping anticoagulation  4. afib lotions or ointments to your incision.   Your incision may be closed with either sutures, steri-strips (butterfly tape), Derma-bond (surgical glue) OR staples.  Derma-bond(surgical glue): Do not peel off, this will fall off on its own.   Steri-strips (butterfly tape): Should fall off in about 7-10 days. If after 10 days they have not fallen off you may gently peel them off.   Internal sutures do not need to be removed, they will dissolve on their own.       Call your Doctor if:  Call if your incision becomes red, swollen, tender or if you have a significant amount of bleeding or discharge.   If you develop a fever greater than 101° F.   Some aching, shooting pains, cramping or burning may occur after surgery.  You should notify your doctor if the pain becomes worse or unbearable and is no longer made tolerable by your prescribed pain medication.   Notify your surgeon if you are unable to empty your bladder in 8-12 hours after surgery or if you are uncomfortable prior to this time.     Special Instructions  Ice Packs: rotate 20 minutes on and then 20 minutes off for discomfort after surgery.   You may be discharged home with an electronic ice unit (for larger/more complicated spinal surgeries such as lumbar fusion).  Cold therapy pad may be left on for up to 2 hours and should then be removed from the skin for at least 30 minutes.   Please see instructions on the side of the electronic ice bucket for additional information.  Do not apply ice pack or cold therapy pad directly on your skin, always keep a clean and dry barrier between your skin and the ice pack or cold therapy pad.  Always monitor your skin for changes, breakdown and/or irritation when ice or cold therapy pad is in use and contact your doctor if any new signs/symptoms occur.  Do not use heat directly over your incision, this can increase the risk of infection.   To avoid constipation, drink 6-8 glasses of water daily.  Use an over-the-counter stool  5. Elevated INR due to DOAC improving   6. Upper arm DVT causing severe swelling. 7. KRISTINA    RECOMMENDATIONS:  1. Considering options. The fluid accumulated gain. S/P Pleurx catheter. 2. Off anticoagulation for now  3. Long discussion about chemo side effects, schedule and expected benefit, pt gave verbal consent to start today. RN witnessed the discussion. 4. Condition is guarded. Due to weakness, I will limit carbo to 600 mg and etoposide to 200 mg,   5. Watch closely for side effects. .  6. Continue off anticoagulation for now, hemoglobin is stable      Discussed with patient and nurse     Thank you for asking us to see this patient.     DENIS MONIQUE [facility] MD [place]  Hematologist/Medical Oncologist  Cell: [phone] softener such as Senokot-S or Miralax twice daily until good bowel movement is achieved.   If results are not achieved within 2-3 days, you may also try an over-the-counter Dulcolax suppository or Fleets enema.   Smoking should be avoided. Smoking has shown to interfere with the healing process.       Post Op Equipment   Will not likely be needed.    Follow-Up  You will need to be seen in your surgeon's clinic in 2 weeks after your surgery.  Expect a call from our office within one week of your surgery.  You will not need X-rays completed prior to your follow-up appointment.     Prescription Refills   All prescription refills will be handled during normal business hours, Monday thru Thursday 8 am - 3:30 and Friday 8 am -12 pm.  There will be no refills provided on the weekends.   **Refill requests made after hours or on weekends, will not be addressed until the following business day.**  We do not manage medication unless in the immediate postoperative period.    It is our office policy that there is a 24-hour processing and response time for all refill requests. Please ensure you contact our office in-regards to your prescription refill needs in a timely manner to allow sufficient processing and response time by clinical staff per policy.   Narcotic prescriptions cannot be called directly into the pharmacy and not all pharmacies accept electronic refills on prescriptions.      Please take this into consideration when contacting your doctor’s office for your prescription refill requests as it may require that you are either able to  a hand-written prescription from the [facility], located at [facility], [address], or that you provide ample time with your request to allow for [employer] Postal [employer] mail delivery of the written prescription.       Instructions specific to your procedure  None       Please contact [facility] with any questions  or concerns:   Office Phone:  [phone]  Address:  [address]  [place] [address]  Office Fax:  [phone]     HOME INSTRUCTION SHEET     Medication: Scopolamine Patch     You have a Scopolamine Patch placed behind your ear to decrease the chance of vomiting after your surgery.     Please follow the instructions listed below for care of the patch.   Do not touch the patch unless you are going to remove it.   Do not rub your eyes after touching the patch, as eye irritation and increase in your pupil size may result.   Remove the patch 24 hours after surgery or when instructed by your Anesthesiologist. Wash your hands IMMEDIATELY after removing patch.   Do not drink alcohol while wearing the patch.   This medicine may cause drowsiness. Do not drive a car, use machinery or do dangerous jobs while the patch is on.   This medicine may make you sweat less causing your body to overheat. Care should be taken when in hot weather, exercising, or if using a sauna or whirlpool. The medicine may also cause a dry mouth.     Call the [facility]  @ [phone] and ask for the Hospital supervisor if you have any of the following:    Severe skin rash or hives    Confusion or memory loss    Cannot go to the bathroom (urinate)    Severe eye pain     Home Instruction Sheet  ANESTHESIA for ADULT       What are the side effects?  Side effects depend on the medication used, and may not even be present.    Most Common Sometimes   Irritability  Poor Balance  Sleeping for Several Hours  Drowsiness  Fatigue  Difficulty Concentrating Change in Behavior  Hyperactivity  Nausea (upset stomach)  Gas (flatulence)  Dizziness  Hiccups  Constipation  Blurred Vision     The effects of sedation medicine can last up to 24 hours.  You may be drowsy or irritable for 2 to 8 hours after receiving medicine.  You may need to sleep after leaving the testing area.  Sleeping after sedation will  help reduce irritability.  Diet:  Do not give anything to eat or drink until you are fully awake.  Eat a light meal and advance to a normal diet unless instructed differently:   Do not drink alcohol beverages for the next 24 hours.  Avoid greasy or spicy foods today.  Activity:  You may be dizzy and/or unsteady.  Ask for help walking, using the bathroom, or stairs to protect yourself from injury.  You should not drive a vehicle, operate machinery or power tools for 24 hours because your reflexes may be slow and your vision may be blurry.  Do not sign any legally binding documents or make important decisions for 24 hours after receiving sedation.  Medications:   Unless told otherwise, do not take any non-prescription medications (cold medicine, etc.) for 24 hours, as these medications in combination with sedation medication, can cause increased drowsiness.  If you feel that you need over the counter medication, discuss with your physician.    When Should I Call the Doctor?  Vomiting more than twice.  Extreme irritability or unusual changes in behavior.  Trouble arousing.  Inability to urinate.  Trouble breathing - call 911.    We would like to take this opportunity to thank you. Because your confidence in your caregivers is very important to us, it is our commitment to always treat you and your family with courtesy and respect. Our goal is to always answer any questions or worries or concerns you may have about the care you received.  If you have any suggestions for improvement or worries or concerns please do not hesitate to let us know.                                                                               Want to Say “Thank You” to a Nurse?  The [employer] Award® was created in memory of YG Nugent by his family to say thank you to bedside nurses who provide an outstanding level of care.    Submit a nomination using any method below.     OR    [email or web address]  Or visit the Resource section   on  your [employer] jayesh